# Patient Record
Sex: FEMALE | Race: ASIAN | NOT HISPANIC OR LATINO
[De-identification: names, ages, dates, MRNs, and addresses within clinical notes are randomized per-mention and may not be internally consistent; named-entity substitution may affect disease eponyms.]

---

## 2021-09-16 ENCOUNTER — APPOINTMENT (OUTPATIENT)
Dept: OBGYN | Facility: CLINIC | Age: 21
End: 2021-09-16

## 2021-09-29 ENCOUNTER — RESULT REVIEW (OUTPATIENT)
Age: 21
End: 2021-09-29

## 2021-09-29 ENCOUNTER — OUTPATIENT (OUTPATIENT)
Dept: OUTPATIENT SERVICES | Facility: HOSPITAL | Age: 21
LOS: 1 days | End: 2021-09-29

## 2021-09-29 ENCOUNTER — APPOINTMENT (OUTPATIENT)
Dept: OBGYN | Facility: HOSPITAL | Age: 21
End: 2021-09-29

## 2021-09-29 ENCOUNTER — NON-APPOINTMENT (OUTPATIENT)
Age: 21
End: 2021-09-29

## 2021-09-29 VITALS
HEART RATE: 89 BPM | DIASTOLIC BLOOD PRESSURE: 70 MMHG | HEIGHT: 61 IN | SYSTOLIC BLOOD PRESSURE: 107 MMHG | WEIGHT: 135 LBS | BODY MASS INDEX: 25.49 KG/M2

## 2021-09-29 DIAGNOSIS — Z83.3 FAMILY HISTORY OF DIABETES MELLITUS: ICD-10-CM

## 2021-09-29 DIAGNOSIS — Z00.00 ENCOUNTER FOR GENERAL ADULT MEDICAL EXAMINATION WITHOUT ABNORMAL FINDINGS: ICD-10-CM

## 2021-09-29 DIAGNOSIS — Z34.92 ENCOUNTER FOR SUPERVISION OF NORMAL PREGNANCY, UNSPECIFIED, SECOND TRIMESTER: ICD-10-CM

## 2021-09-29 DIAGNOSIS — Z82.49 FAMILY HISTORY OF ISCHEMIC HEART DISEASE AND OTHER DISEASES OF THE CIRCULATORY SYSTEM: ICD-10-CM

## 2021-09-29 DIAGNOSIS — Z00.00 ENCOUNTER FOR GENERAL ADULT MEDICAL EXAMINATION W/OUT ABNORMAL FINDINGS: ICD-10-CM

## 2021-09-29 LAB
A1C WITH ESTIMATED AVERAGE GLUCOSE RESULT: 5.2 % — SIGNIFICANT CHANGE UP (ref 4–5.6)
ALBUMIN SERPL ELPH-MCNC: 4.5 G/DL — SIGNIFICANT CHANGE UP (ref 3.3–5)
ALP SERPL-CCNC: 45 U/L — SIGNIFICANT CHANGE UP (ref 40–120)
ALT FLD-CCNC: 25 U/L — SIGNIFICANT CHANGE UP (ref 4–33)
ANION GAP SERPL CALC-SCNC: 15 MMOL/L — HIGH (ref 7–14)
APPEARANCE UR: ABNORMAL
AST SERPL-CCNC: 17 U/L — SIGNIFICANT CHANGE UP (ref 4–32)
BACTERIA # UR AUTO: ABNORMAL
BASOPHILS # BLD AUTO: 0.02 K/UL — SIGNIFICANT CHANGE UP (ref 0–0.2)
BASOPHILS NFR BLD AUTO: 0.2 % — SIGNIFICANT CHANGE UP (ref 0–2)
BILIRUB SERPL-MCNC: <0.2 MG/DL — SIGNIFICANT CHANGE UP (ref 0.2–1.2)
BILIRUB UR-MCNC: NEGATIVE — SIGNIFICANT CHANGE UP
BUN SERPL-MCNC: 5 MG/DL — LOW (ref 7–23)
CALCIUM SERPL-MCNC: 9.7 MG/DL — SIGNIFICANT CHANGE UP (ref 8.4–10.5)
CHLORIDE SERPL-SCNC: 102 MMOL/L — SIGNIFICANT CHANGE UP (ref 98–107)
CO2 SERPL-SCNC: 20 MMOL/L — LOW (ref 22–31)
COLOR SPEC: YELLOW — SIGNIFICANT CHANGE UP
CREAT SERPL-MCNC: 0.45 MG/DL — LOW (ref 0.5–1.3)
DIFF PNL FLD: ABNORMAL
EOSINOPHIL # BLD AUTO: 0.15 K/UL — SIGNIFICANT CHANGE UP (ref 0–0.5)
EOSINOPHIL NFR BLD AUTO: 1.6 % — SIGNIFICANT CHANGE UP (ref 0–6)
EPI CELLS # UR: 9 /HPF — HIGH (ref 0–5)
ESTIMATED AVERAGE GLUCOSE: 103 — SIGNIFICANT CHANGE UP
GLUCOSE SERPL-MCNC: 71 MG/DL — SIGNIFICANT CHANGE UP (ref 70–99)
GLUCOSE UR QL: NEGATIVE — SIGNIFICANT CHANGE UP
HCT VFR BLD CALC: 33.5 % — LOW (ref 34.5–45)
HGB BLD-MCNC: 11.1 G/DL — LOW (ref 11.5–15.5)
HIV 1+2 AB+HIV1 P24 AG SERPL QL IA: SIGNIFICANT CHANGE UP
HYALINE CASTS # UR AUTO: 1 /LPF — SIGNIFICANT CHANGE UP (ref 0–7)
IANC: 6.42 K/UL — SIGNIFICANT CHANGE UP (ref 1.5–8.5)
IMM GRANULOCYTES NFR BLD AUTO: 0.4 % — SIGNIFICANT CHANGE UP (ref 0–1.5)
KETONES UR-MCNC: NEGATIVE — SIGNIFICANT CHANGE UP
LEUKOCYTE ESTERASE UR-ACNC: ABNORMAL
LYMPHOCYTES # BLD AUTO: 2.19 K/UL — SIGNIFICANT CHANGE UP (ref 1–3.3)
LYMPHOCYTES # BLD AUTO: 23.5 % — SIGNIFICANT CHANGE UP (ref 13–44)
MCHC RBC-ENTMCNC: 26.9 PG — LOW (ref 27–34)
MCHC RBC-ENTMCNC: 33.1 GM/DL — SIGNIFICANT CHANGE UP (ref 32–36)
MCV RBC AUTO: 81.3 FL — SIGNIFICANT CHANGE UP (ref 80–100)
MONOCYTES # BLD AUTO: 0.48 K/UL — SIGNIFICANT CHANGE UP (ref 0–0.9)
MONOCYTES NFR BLD AUTO: 5.2 % — SIGNIFICANT CHANGE UP (ref 2–14)
NEUTROPHILS # BLD AUTO: 6.42 K/UL — SIGNIFICANT CHANGE UP (ref 1.8–7.4)
NEUTROPHILS NFR BLD AUTO: 69.1 % — SIGNIFICANT CHANGE UP (ref 43–77)
NITRITE UR-MCNC: NEGATIVE — SIGNIFICANT CHANGE UP
NRBC # BLD: 0 /100 WBCS — SIGNIFICANT CHANGE UP
NRBC # FLD: 0 K/UL — SIGNIFICANT CHANGE UP
PH UR: 6 — SIGNIFICANT CHANGE UP (ref 5–8)
PLATELET # BLD AUTO: 326 K/UL — SIGNIFICANT CHANGE UP (ref 150–400)
POTASSIUM SERPL-MCNC: 3.8 MMOL/L — SIGNIFICANT CHANGE UP (ref 3.5–5.3)
POTASSIUM SERPL-SCNC: 3.8 MMOL/L — SIGNIFICANT CHANGE UP (ref 3.5–5.3)
PROT SERPL-MCNC: 7.7 G/DL — SIGNIFICANT CHANGE UP (ref 6–8.3)
PROT UR-MCNC: ABNORMAL
RBC # BLD: 4.12 M/UL — SIGNIFICANT CHANGE UP (ref 3.8–5.2)
RBC # FLD: 17.2 % — HIGH (ref 10.3–14.5)
RBC CASTS # UR COMP ASSIST: 7 /HPF — HIGH (ref 0–4)
SODIUM SERPL-SCNC: 137 MMOL/L — SIGNIFICANT CHANGE UP (ref 135–145)
SP GR SPEC: 1.02 — SIGNIFICANT CHANGE UP (ref 1–1.05)
TSH SERPL-MCNC: 1.14 UIU/ML — SIGNIFICANT CHANGE UP (ref 0.27–4.2)
URATE SERPL-MCNC: 4.3 MG/DL — SIGNIFICANT CHANGE UP (ref 2.5–7)
UROBILINOGEN FLD QL: SIGNIFICANT CHANGE UP
WBC # BLD: 9.3 K/UL — SIGNIFICANT CHANGE UP (ref 3.8–10.5)
WBC # FLD AUTO: 9.3 K/UL — SIGNIFICANT CHANGE UP (ref 3.8–10.5)
WBC UR QL: 16 /HPF — HIGH (ref 0–5)

## 2021-09-30 ENCOUNTER — NON-APPOINTMENT (OUTPATIENT)
Age: 21
End: 2021-09-30

## 2021-09-30 LAB
24R-OH-CALCIDIOL SERPL-MCNC: 18.1 NG/ML — LOW (ref 30–80)
HBV SURFACE AG SER-ACNC: SIGNIFICANT CHANGE UP
HEMOGLOBIN INTERPRETATION: SIGNIFICANT CHANGE UP
HGB A MFR BLD: 96.7 % — SIGNIFICANT CHANGE UP
HGB A2 MFR BLD: 3 % — SIGNIFICANT CHANGE UP (ref 2.4–3.5)
HGB F MFR BLD: <1 % — SIGNIFICANT CHANGE UP (ref 0–1.5)
MEV IGG SER-ACNC: 149 AU/ML — SIGNIFICANT CHANGE UP
MEV IGG+IGM SER-IMP: POSITIVE — SIGNIFICANT CHANGE UP
RUBV IGG SER-ACNC: 6.2 INDEX — SIGNIFICANT CHANGE UP
RUBV IGG SER-IMP: POSITIVE — SIGNIFICANT CHANGE UP
T PALLIDUM AB TITR SER: NEGATIVE — SIGNIFICANT CHANGE UP
VZV IGG FLD QL IA: 278.6 INDEX — SIGNIFICANT CHANGE UP
VZV IGG FLD QL IA: POSITIVE — SIGNIFICANT CHANGE UP

## 2021-10-01 ENCOUNTER — NON-APPOINTMENT (OUTPATIENT)
Age: 21
End: 2021-10-01

## 2021-10-01 LAB
C TRACH RRNA SPEC QL NAA+PROBE: SIGNIFICANT CHANGE UP
CULTURE RESULTS: SIGNIFICANT CHANGE UP
GAMMA INTERFERON BACKGROUND BLD IA-ACNC: 0.04 IU/ML — SIGNIFICANT CHANGE UP
LEAD BLD-MCNC: 2 UG/DL — SIGNIFICANT CHANGE UP (ref 0–4)
M TB IFN-G BLD-IMP: NEGATIVE — SIGNIFICANT CHANGE UP
M TB IFN-G CD4+ BCKGRND COR BLD-ACNC: -0.01 IU/ML — SIGNIFICANT CHANGE UP
M TB IFN-G CD4+CD8+ BCKGRND COR BLD-ACNC: -0.01 IU/ML — SIGNIFICANT CHANGE UP
N GONORRHOEA RRNA SPEC QL NAA+PROBE: SIGNIFICANT CHANGE UP
QUANT TB PLUS MITOGEN MINUS NIL: 8.78 IU/ML — SIGNIFICANT CHANGE UP
SPECIMEN SOURCE: SIGNIFICANT CHANGE UP
SPECIMEN SOURCE: SIGNIFICANT CHANGE UP

## 2021-10-02 LAB — HCV RNA FLD QL NAA+PROBE: SIGNIFICANT CHANGE UP

## 2021-10-05 LAB
CYTOLOGY SPEC DOC CYTO: SIGNIFICANT CHANGE UP
SMA INTERPRETATION: SIGNIFICANT CHANGE UP

## 2021-10-06 ENCOUNTER — NON-APPOINTMENT (OUTPATIENT)
Age: 21
End: 2021-10-06

## 2021-10-07 LAB — CYSTIC FIBROSIS EXPANDED PANEL: SIGNIFICANT CHANGE UP

## 2021-10-08 ENCOUNTER — APPOINTMENT (OUTPATIENT)
Dept: ANTEPARTUM | Facility: CLINIC | Age: 21
End: 2021-10-08
Payer: MEDICAID

## 2021-10-12 ENCOUNTER — ASOB RESULT (OUTPATIENT)
Age: 21
End: 2021-10-12

## 2021-10-12 ENCOUNTER — APPOINTMENT (OUTPATIENT)
Dept: ANTEPARTUM | Facility: CLINIC | Age: 21
End: 2021-10-12
Payer: MEDICAID

## 2021-10-12 PROCEDURE — 76815 OB US LIMITED FETUS(S): CPT | Mod: 26

## 2021-10-15 ENCOUNTER — NON-APPOINTMENT (OUTPATIENT)
Age: 21
End: 2021-10-15

## 2021-10-18 ENCOUNTER — NON-APPOINTMENT (OUTPATIENT)
Age: 21
End: 2021-10-18

## 2021-10-27 ENCOUNTER — NON-APPOINTMENT (OUTPATIENT)
Age: 21
End: 2021-10-27

## 2021-10-29 ENCOUNTER — RESULT REVIEW (OUTPATIENT)
Age: 21
End: 2021-10-29

## 2021-10-29 ENCOUNTER — APPOINTMENT (OUTPATIENT)
Dept: OBGYN | Facility: HOSPITAL | Age: 21
End: 2021-10-29

## 2021-10-29 ENCOUNTER — MED ADMIN CHARGE (OUTPATIENT)
Age: 21
End: 2021-10-29

## 2021-10-29 ENCOUNTER — OUTPATIENT (OUTPATIENT)
Dept: OUTPATIENT SERVICES | Facility: HOSPITAL | Age: 21
LOS: 1 days | End: 2021-10-29

## 2021-10-29 DIAGNOSIS — Z34.92 ENCOUNTER FOR SUPERVISION OF NORMAL PREGNANCY, UNSPECIFIED, SECOND TRIMESTER: ICD-10-CM

## 2021-10-29 DIAGNOSIS — E55.9 VITAMIN D DEFICIENCY, UNSPECIFIED: ICD-10-CM

## 2021-10-29 DIAGNOSIS — Z23 ENCOUNTER FOR IMMUNIZATION: ICD-10-CM

## 2021-11-01 LAB
2ND TRIMESTER DATA: SIGNIFICANT CHANGE UP
AFP SERPL-ACNC: SIGNIFICANT CHANGE UP
B-HCG FREE SERPL-MCNC: SIGNIFICANT CHANGE UP
CLINICAL BIOCHEMIST REVIEW: SIGNIFICANT CHANGE UP
DEMOGRAPHIC DATA: SIGNIFICANT CHANGE UP
INHIBIN A SERPL-MCNC: SIGNIFICANT CHANGE UP
SCREEN-FOOTER: SIGNIFICANT CHANGE UP
U ESTRIOL SERPL-SCNC: SIGNIFICANT CHANGE UP

## 2021-11-17 ENCOUNTER — NON-APPOINTMENT (OUTPATIENT)
Age: 21
End: 2021-11-17

## 2021-11-19 ENCOUNTER — OUTPATIENT (OUTPATIENT)
Dept: OUTPATIENT SERVICES | Facility: HOSPITAL | Age: 21
LOS: 1 days | End: 2021-11-19

## 2021-11-19 ENCOUNTER — APPOINTMENT (OUTPATIENT)
Dept: ANTEPARTUM | Facility: CLINIC | Age: 21
End: 2021-11-19
Payer: MEDICAID

## 2021-11-19 ENCOUNTER — APPOINTMENT (OUTPATIENT)
Dept: OBGYN | Facility: HOSPITAL | Age: 21
End: 2021-11-19

## 2021-11-19 ENCOUNTER — NON-APPOINTMENT (OUTPATIENT)
Age: 21
End: 2021-11-19

## 2021-11-19 ENCOUNTER — ASOB RESULT (OUTPATIENT)
Age: 21
End: 2021-11-19

## 2021-11-19 VITALS
HEIGHT: 61 IN | DIASTOLIC BLOOD PRESSURE: 65 MMHG | BODY MASS INDEX: 27.08 KG/M2 | WEIGHT: 143.4 LBS | SYSTOLIC BLOOD PRESSURE: 120 MMHG

## 2021-11-19 DIAGNOSIS — Z34.92 ENCOUNTER FOR SUPERVISION OF NORMAL PREGNANCY, UNSPECIFIED, SECOND TRIMESTER: ICD-10-CM

## 2021-11-19 PROCEDURE — 76805 OB US >/= 14 WKS SNGL FETUS: CPT | Mod: 26

## 2021-11-21 ENCOUNTER — EMERGENCY (EMERGENCY)
Facility: HOSPITAL | Age: 21
LOS: 1 days | Discharge: STILL ADMITTED OR ADMINISTRATIVE | End: 2021-11-21
Admitting: EMERGENCY MEDICINE
Payer: MEDICAID

## 2021-11-21 ENCOUNTER — OUTPATIENT (OUTPATIENT)
Dept: INPATIENT UNIT | Facility: HOSPITAL | Age: 21
LOS: 1 days | Discharge: HOME | End: 2021-11-21
Payer: MEDICAID

## 2021-11-21 VITALS
HEART RATE: 84 BPM | RESPIRATION RATE: 18 BRPM | TEMPERATURE: 98 F | DIASTOLIC BLOOD PRESSURE: 60 MMHG | SYSTOLIC BLOOD PRESSURE: 98 MMHG | OXYGEN SATURATION: 100 %

## 2021-11-21 VITALS — HEART RATE: 78 BPM | DIASTOLIC BLOOD PRESSURE: 51 MMHG | SYSTOLIC BLOOD PRESSURE: 90 MMHG

## 2021-11-21 VITALS — SYSTOLIC BLOOD PRESSURE: 97 MMHG | DIASTOLIC BLOOD PRESSURE: 54 MMHG | HEART RATE: 87 BPM

## 2021-11-21 DIAGNOSIS — O26.899 OTHER SPECIFIED PREGNANCY RELATED CONDITIONS, UNSPECIFIED TRIMESTER: ICD-10-CM

## 2021-11-21 DIAGNOSIS — Z3A.00 WEEKS OF GESTATION OF PREGNANCY NOT SPECIFIED: ICD-10-CM

## 2021-11-21 LAB
ALBUMIN SERPL ELPH-MCNC: 4 G/DL — SIGNIFICANT CHANGE UP (ref 3.3–5)
ALP SERPL-CCNC: 48 U/L — SIGNIFICANT CHANGE UP (ref 40–120)
ALT FLD-CCNC: 19 U/L — SIGNIFICANT CHANGE UP (ref 4–33)
AMYLASE P1 CFR SERPL: 67 U/L — SIGNIFICANT CHANGE UP (ref 25–125)
ANION GAP SERPL CALC-SCNC: 12 MMOL/L — SIGNIFICANT CHANGE UP (ref 7–14)
APPEARANCE UR: ABNORMAL
AST SERPL-CCNC: 17 U/L — SIGNIFICANT CHANGE UP (ref 4–32)
BACTERIA # UR AUTO: ABNORMAL
BASOPHILS # BLD AUTO: 0.04 K/UL — SIGNIFICANT CHANGE UP (ref 0–0.2)
BASOPHILS NFR BLD AUTO: 0.4 % — SIGNIFICANT CHANGE UP (ref 0–2)
BILIRUB SERPL-MCNC: <0.2 MG/DL — SIGNIFICANT CHANGE UP (ref 0.2–1.2)
BILIRUB UR-MCNC: NEGATIVE — SIGNIFICANT CHANGE UP
BUN SERPL-MCNC: 10 MG/DL — SIGNIFICANT CHANGE UP (ref 7–23)
CALCIUM SERPL-MCNC: 9.8 MG/DL — SIGNIFICANT CHANGE UP (ref 8.4–10.5)
CHLORIDE SERPL-SCNC: 105 MMOL/L — SIGNIFICANT CHANGE UP (ref 98–107)
CO2 SERPL-SCNC: 20 MMOL/L — LOW (ref 22–31)
COLOR SPEC: YELLOW — SIGNIFICANT CHANGE UP
COMMENT - URINE: SIGNIFICANT CHANGE UP
CREAT SERPL-MCNC: 0.49 MG/DL — LOW (ref 0.5–1.3)
DIFF PNL FLD: NEGATIVE — SIGNIFICANT CHANGE UP
EOSINOPHIL # BLD AUTO: 0.22 K/UL — SIGNIFICANT CHANGE UP (ref 0–0.5)
EOSINOPHIL NFR BLD AUTO: 2 % — SIGNIFICANT CHANGE UP (ref 0–6)
EPI CELLS # UR: ABNORMAL
GLUCOSE SERPL-MCNC: 124 MG/DL — HIGH (ref 70–99)
GLUCOSE UR QL: ABNORMAL
HCT VFR BLD CALC: 29.1 % — LOW (ref 34.5–45)
HGB BLD-MCNC: 9.4 G/DL — LOW (ref 11.5–15.5)
HYALINE CASTS # UR AUTO: 1 /LPF — SIGNIFICANT CHANGE UP (ref 0–7)
IANC: 8.08 K/UL — SIGNIFICANT CHANGE UP (ref 1.5–8.5)
IMM GRANULOCYTES NFR BLD AUTO: 1.1 % — SIGNIFICANT CHANGE UP (ref 0–1.5)
KETONES UR-MCNC: NEGATIVE — SIGNIFICANT CHANGE UP
LEUKOCYTE ESTERASE UR-ACNC: NEGATIVE — SIGNIFICANT CHANGE UP
LIDOCAIN IGE QN: 52 U/L — SIGNIFICANT CHANGE UP (ref 7–60)
LYMPHOCYTES # BLD AUTO: 18.4 % — SIGNIFICANT CHANGE UP (ref 13–44)
LYMPHOCYTES # BLD AUTO: 2.08 K/UL — SIGNIFICANT CHANGE UP (ref 1–3.3)
MCHC RBC-ENTMCNC: 28.3 PG — SIGNIFICANT CHANGE UP (ref 27–34)
MCHC RBC-ENTMCNC: 32.3 GM/DL — SIGNIFICANT CHANGE UP (ref 32–36)
MCV RBC AUTO: 87.7 FL — SIGNIFICANT CHANGE UP (ref 80–100)
MONOCYTES # BLD AUTO: 0.74 K/UL — SIGNIFICANT CHANGE UP (ref 0–0.9)
MONOCYTES NFR BLD AUTO: 6.6 % — SIGNIFICANT CHANGE UP (ref 2–14)
NEUTROPHILS # BLD AUTO: 8.08 K/UL — HIGH (ref 1.8–7.4)
NEUTROPHILS NFR BLD AUTO: 71.5 % — SIGNIFICANT CHANGE UP (ref 43–77)
NITRITE UR-MCNC: NEGATIVE — SIGNIFICANT CHANGE UP
NRBC # BLD: 0 /100 WBCS — SIGNIFICANT CHANGE UP
NRBC # FLD: 0 K/UL — SIGNIFICANT CHANGE UP
PH UR: 8 — SIGNIFICANT CHANGE UP (ref 5–8)
PLATELET # BLD AUTO: 282 K/UL — SIGNIFICANT CHANGE UP (ref 150–400)
POTASSIUM SERPL-MCNC: 3.8 MMOL/L — SIGNIFICANT CHANGE UP (ref 3.5–5.3)
POTASSIUM SERPL-SCNC: 3.8 MMOL/L — SIGNIFICANT CHANGE UP (ref 3.5–5.3)
PROT SERPL-MCNC: 6.8 G/DL — SIGNIFICANT CHANGE UP (ref 6–8.3)
PROT UR-MCNC: ABNORMAL
RBC # BLD: 3.32 M/UL — LOW (ref 3.8–5.2)
RBC # FLD: 13.2 % — SIGNIFICANT CHANGE UP (ref 10.3–14.5)
RBC CASTS # UR COMP ASSIST: 3 /HPF — SIGNIFICANT CHANGE UP (ref 0–4)
SODIUM SERPL-SCNC: 137 MMOL/L — SIGNIFICANT CHANGE UP (ref 135–145)
SP GR SPEC: 1.02 — SIGNIFICANT CHANGE UP (ref 1–1.05)
UROBILINOGEN FLD QL: SIGNIFICANT CHANGE UP
WBC # BLD: 11.28 K/UL — HIGH (ref 3.8–10.5)
WBC # FLD AUTO: 11.28 K/UL — HIGH (ref 3.8–10.5)
WBC UR QL: 5 /HPF — SIGNIFICANT CHANGE UP (ref 0–5)

## 2021-11-21 PROCEDURE — 99214 OFFICE O/P EST MOD 30 MIN: CPT

## 2021-11-21 PROCEDURE — L9993: CPT

## 2021-11-21 RX ORDER — ACETAMINOPHEN 500 MG
1000 TABLET ORAL ONCE
Refills: 0 | Status: COMPLETED | OUTPATIENT
Start: 2021-11-21 | End: 2021-11-21

## 2021-11-21 RX ADMIN — Medication 1000 MILLIGRAM(S): at 03:23

## 2021-11-21 NOTE — ED ADULT TRIAGE NOTE - NS ED NURSE NOTE DISPO AOU DETAILS FT
Pt to be seen in L&D as per L&D provider Maggy Pt to be seen in L&D as per L&D provider Maggy. Escorted upstairs via wheelchair with Brennan and

## 2021-11-21 NOTE — OB PROVIDER TRIAGE NOTE - NSHPPHYSICALEXAM_GEN_ALL_CORE
ICU Vital Signs Last 24 Hrs  T(C): 36.7 (21 Nov 2021 02:30), Max: 36.7 (21 Nov 2021 01:41)  T(F): 98.1 (21 Nov 2021 02:30), Max: 98.1 (21 Nov 2021 02:30)  HR: 93 (21 Nov 2021 03:29) (84 - 93)  BP: 157/74 (21 Nov 2021 03:25) (93/53 - 157/74)  BP(mean): --  ABP: --  ABP(mean): --  RR: 17 (21 Nov 2021 02:30) (17 - 18)  SpO2: 94% (21 Nov 2021 03:25) (94% - 100%)    Abdomen soft nontender  No pain with deep palpation   TAS: Transverse presentation, anterior placenta, FHR: 150bpm, MVP: 5.18, EFW:393gms  Pt. refused speculum and TVS

## 2021-11-21 NOTE — ED ADULT TRIAGE NOTE - CHIEF COMPLAINT QUOTE
Pt c/o intermittent abd cramping x3 hours. Reports approx x5 months pregnant CORNELIO 4/18 states OB Dr. Ya. Denies ROM, bleeding. To be seen in L&D as per L&D provider Maggy

## 2021-11-21 NOTE — OB PROVIDER TRIAGE NOTE - ADDITIONAL INSTRUCTIONS
Pt. d/c'd home. Pt. instructed to follow up with next OB appointment. Pt. instructed to return to triage with increase abdominal contractions, leakage of fluid, vaginal bleeding or decrease fetal movement. Increase PO hydration encouraged.

## 2021-11-21 NOTE — OB PROVIDER TRIAGE NOTE - NSHPLABSRESULTS_GEN_ALL_CORE
9.4     )-----------( 282      ( 2021 03:28 )             29.1         137  |  105  |  10  ----------------------------<  124<H>  3.8   |  20<L>  |  0.49<L>    Ca    9.8      2021 03:28    TPro  6.8  /  Alb  4.0  /  TBili  <0.2  /  DBili  x   /  AST  17  /  ALT  19  /  AlkPhos  48  -    Amylase:  Lipase:    Urinalysis Basic - ( 2021 03:28 )    Color: Yellow / Appearance: Slightly Turbid / S.020 / pH: x  Gluc: x / Ketone: Negative  / Bili: Negative / Urobili: <2 mg/dL   Blood: x / Protein: 100 mg/dL / Nitrite: Negative   Leuk Esterase: Negative / RBC: 3 /HPF / WBC 5 /HPF   Sq Epi: x / Non Sq Epi: Many / Bacteria: Moderate 9.4     )-----------( 282      ( 2021 03:28 )             29.1         137  |  105  |  10  ----------------------------<  124<H>  3.8   |  20<L>  |  0.49<L>    Ca    9.8      2021 03:28    TPro  6.8  /  Alb  4.0  /  TBili  <0.2  /  DBili  x   /  AST  17  /  ALT  19  /  AlkPhos  48  11-    Amylase: 67  Lipase: 52    Urinalysis Basic - ( 2021 03:28 )    Color: Yellow / Appearance: Slightly Turbid / S.020 / pH: x  Gluc: x / Ketone: Negative  / Bili: Negative / Urobili: <2 mg/dL   Blood: x / Protein: 100 mg/dL / Nitrite: Negative   Leuk Esterase: Negative / RBC: 3 /HPF / WBC 5 /HPF   Sq Epi: x / Non Sq Epi: Many / Bacteria: Moderate

## 2021-11-21 NOTE — OB PROVIDER TRIAGE NOTE - NSOBPROVIDERNOTE_OBGYN_ALL_OB_FT
UA, CBC, CMP, Amylase/Lipase ordered  Tylenol 1000mg ordered UA, CBC, CMP, Amylase/Lipase ordered  Tylenol 1000mg ordered    @430am   Pt. states she feels better after Tylenol    Discussed findings with Dr. Pacheco. Pt. d/c'd home. Pt. instructed to follow up with next OB appointment. Pt. instructed to return to triage with increase abdominal contractions, leakage of fluid, vaginal bleeding or decrease fetal movement. Increase PO hydration encouraged. UA, CBC, CMP, Amylase/Lipase ordered  Tylenol 1000mg ordered    @430am   Pt. states she feels better after Tylenol    Discussed findings with Dr. Pacheco/Dr. Sherman. Pt. d/c'd home. Pt. instructed to follow up with next OB appointment. Pt. instructed to return to triage with increase abdominal contractions, leakage of fluid, vaginal bleeding or decrease fetal movement. Increase PO hydration encouraged.

## 2021-11-21 NOTE — OB PROVIDER TRIAGE NOTE - HISTORY OF PRESENT ILLNESS
Pt. is a 20y/o  EGA 20.6wks reports of bilateral upper abdominal pain and mid left side abdominal pain that comes and goes. Pt. states the pain started since 5pm and is 10/10. Pt. denies lower abdominal pain, contractions, pelvic pressure, and dysuria. Pt. reports good fetal movement.      AP: Denies  Medical/surgical Hx: Denies  OBGYN Hx: Denies  Meds: PNV and Iron  NKDA

## 2021-11-23 ENCOUNTER — RESULT REVIEW (OUTPATIENT)
Age: 21
End: 2021-11-23

## 2021-11-23 ENCOUNTER — OUTPATIENT (OUTPATIENT)
Dept: OUTPATIENT SERVICES | Facility: HOSPITAL | Age: 21
LOS: 1 days | End: 2021-11-23
Payer: MEDICAID

## 2021-11-23 ENCOUNTER — APPOINTMENT (OUTPATIENT)
Dept: ULTRASOUND IMAGING | Facility: IMAGING CENTER | Age: 21
End: 2021-11-23
Payer: MEDICAID

## 2021-11-23 DIAGNOSIS — R10.9 UNSPECIFIED ABDOMINAL PAIN: ICD-10-CM

## 2021-11-23 PROCEDURE — 76700 US EXAM ABDOM COMPLETE: CPT

## 2021-11-23 PROCEDURE — 76700 US EXAM ABDOM COMPLETE: CPT | Mod: 26

## 2021-12-13 ENCOUNTER — NON-APPOINTMENT (OUTPATIENT)
Age: 21
End: 2021-12-13

## 2021-12-14 ENCOUNTER — APPOINTMENT (OUTPATIENT)
Dept: OBGYN | Facility: HOSPITAL | Age: 21
End: 2021-12-14

## 2021-12-14 ENCOUNTER — OUTPATIENT (OUTPATIENT)
Dept: OUTPATIENT SERVICES | Facility: HOSPITAL | Age: 21
LOS: 1 days | End: 2021-12-14

## 2021-12-14 VITALS
SYSTOLIC BLOOD PRESSURE: 109 MMHG | BODY MASS INDEX: 27.38 KG/M2 | DIASTOLIC BLOOD PRESSURE: 61 MMHG | WEIGHT: 145 LBS | HEIGHT: 61 IN | HEART RATE: 98 BPM | TEMPERATURE: 97.4 F

## 2021-12-21 DIAGNOSIS — Z34.92 ENCOUNTER FOR SUPERVISION OF NORMAL PREGNANCY, UNSPECIFIED, SECOND TRIMESTER: ICD-10-CM

## 2022-01-04 ENCOUNTER — OUTPATIENT (OUTPATIENT)
Dept: OUTPATIENT SERVICES | Facility: HOSPITAL | Age: 22
LOS: 1 days | End: 2022-01-04

## 2022-01-04 ENCOUNTER — APPOINTMENT (OUTPATIENT)
Dept: OBGYN | Facility: HOSPITAL | Age: 22
End: 2022-01-04

## 2022-01-04 VITALS
HEART RATE: 104 BPM | DIASTOLIC BLOOD PRESSURE: 62 MMHG | WEIGHT: 148 LBS | SYSTOLIC BLOOD PRESSURE: 103 MMHG | TEMPERATURE: 98.4 F

## 2022-01-05 DIAGNOSIS — Z34.93 ENCOUNTER FOR SUPERVISION OF NORMAL PREGNANCY, UNSPECIFIED, THIRD TRIMESTER: ICD-10-CM

## 2022-01-06 ENCOUNTER — NON-APPOINTMENT (OUTPATIENT)
Age: 22
End: 2022-01-06

## 2022-01-14 ENCOUNTER — RESULT REVIEW (OUTPATIENT)
Age: 22
End: 2022-01-14

## 2022-01-14 ENCOUNTER — APPOINTMENT (OUTPATIENT)
Dept: OBGYN | Facility: HOSPITAL | Age: 22
End: 2022-01-14

## 2022-01-14 ENCOUNTER — OUTPATIENT (OUTPATIENT)
Dept: OUTPATIENT SERVICES | Facility: HOSPITAL | Age: 22
LOS: 1 days | End: 2022-01-14

## 2022-01-14 VITALS
WEIGHT: 150 LBS | BODY MASS INDEX: 28.32 KG/M2 | HEART RATE: 95 BPM | DIASTOLIC BLOOD PRESSURE: 67 MMHG | TEMPERATURE: 98.1 F | SYSTOLIC BLOOD PRESSURE: 113 MMHG | HEIGHT: 61 IN

## 2022-01-14 LAB
24R-OH-CALCIDIOL SERPL-MCNC: 16.2 NG/ML — LOW (ref 30–80)
BASOPHILS # BLD AUTO: 0.05 K/UL — SIGNIFICANT CHANGE UP (ref 0–0.2)
BASOPHILS NFR BLD AUTO: 0.4 % — SIGNIFICANT CHANGE UP (ref 0–2)
EOSINOPHIL # BLD AUTO: 0.17 K/UL — SIGNIFICANT CHANGE UP (ref 0–0.5)
EOSINOPHIL NFR BLD AUTO: 1.4 % — SIGNIFICANT CHANGE UP (ref 0–6)
GLUCOSE 1H P MEAL SERPL-MCNC: 171 MG/DL — HIGH (ref 70–134)
HCT VFR BLD CALC: 30.1 % — LOW (ref 34.5–45)
HGB BLD-MCNC: 10 G/DL — LOW (ref 11.5–15.5)
IANC: 8.51 K/UL — HIGH (ref 1.5–8.5)
IMM GRANULOCYTES NFR BLD AUTO: 2.5 % — HIGH (ref 0–1.5)
LYMPHOCYTES # BLD AUTO: 18.5 % — SIGNIFICANT CHANGE UP (ref 13–44)
LYMPHOCYTES # BLD AUTO: 2.18 K/UL — SIGNIFICANT CHANGE UP (ref 1–3.3)
MCHC RBC-ENTMCNC: 29.1 PG — SIGNIFICANT CHANGE UP (ref 27–34)
MCHC RBC-ENTMCNC: 33.2 GM/DL — SIGNIFICANT CHANGE UP (ref 32–36)
MCV RBC AUTO: 87.5 FL — SIGNIFICANT CHANGE UP (ref 80–100)
MONOCYTES # BLD AUTO: 0.57 K/UL — SIGNIFICANT CHANGE UP (ref 0–0.9)
MONOCYTES NFR BLD AUTO: 4.8 % — SIGNIFICANT CHANGE UP (ref 2–14)
NEUTROPHILS # BLD AUTO: 8.51 K/UL — HIGH (ref 1.8–7.4)
NEUTROPHILS NFR BLD AUTO: 72.4 % — SIGNIFICANT CHANGE UP (ref 43–77)
NRBC # BLD: 0 /100 WBCS — SIGNIFICANT CHANGE UP
NRBC # FLD: 0 K/UL — SIGNIFICANT CHANGE UP
PLATELET # BLD AUTO: 290 K/UL — SIGNIFICANT CHANGE UP (ref 150–400)
RBC # BLD: 3.44 M/UL — LOW (ref 3.8–5.2)
RBC # FLD: 13 % — SIGNIFICANT CHANGE UP (ref 10.3–14.5)
T PALLIDUM AB TITR SER: NEGATIVE — SIGNIFICANT CHANGE UP
WBC # BLD: 11.77 K/UL — HIGH (ref 3.8–10.5)
WBC # FLD AUTO: 11.77 K/UL — HIGH (ref 3.8–10.5)

## 2022-01-14 RX ORDER — HUMAN RHO(D) IMMUNE GLOBULIN 300 UG/1
1500 INJECTION, SOLUTION INTRAMUSCULAR
Qty: 0 | Refills: 0 | Status: COMPLETED | OUTPATIENT
Start: 2022-01-14

## 2022-01-14 RX ADMIN — HUMAN RHO(D) IMMUNE GLOBULIN 0 UNIT: 300 INJECTION, SOLUTION INTRAMUSCULAR at 00:00

## 2022-01-18 DIAGNOSIS — Z33.1 PREGNANT STATE, INCIDENTAL: ICD-10-CM

## 2022-01-21 ENCOUNTER — OUTPATIENT (OUTPATIENT)
Dept: OUTPATIENT SERVICES | Facility: HOSPITAL | Age: 22
LOS: 1 days | End: 2022-01-21

## 2022-01-21 ENCOUNTER — NON-APPOINTMENT (OUTPATIENT)
Age: 22
End: 2022-01-21

## 2022-01-21 ENCOUNTER — RESULT REVIEW (OUTPATIENT)
Age: 22
End: 2022-01-21

## 2022-01-21 ENCOUNTER — APPOINTMENT (OUTPATIENT)
Dept: OBGYN | Facility: HOSPITAL | Age: 22
End: 2022-01-21

## 2022-01-21 DIAGNOSIS — Z34.92 ENCOUNTER FOR SUPERVISION OF NORMAL PREGNANCY, UNSPECIFIED, SECOND TRIMESTER: ICD-10-CM

## 2022-01-21 LAB
GESTATIONAL GTT PNL UR+SERPL: 99 MG/DL — HIGH (ref 70–94)
GLUCOSE 1H P CHAL SERPL-MCNC: 168 MG/DL — SIGNIFICANT CHANGE UP (ref 70–179)
GTT GEST 2H PNL UR+SERPL: 157 MG/DL — HIGH (ref 70–154)
GTT GEST 3H PNL SERPL: 112 MG/DL — SIGNIFICANT CHANGE UP (ref 70–139)

## 2022-01-24 ENCOUNTER — NON-APPOINTMENT (OUTPATIENT)
Age: 22
End: 2022-01-24

## 2022-01-25 ENCOUNTER — APPOINTMENT (OUTPATIENT)
Dept: OBGYN | Facility: HOSPITAL | Age: 22
End: 2022-01-25

## 2022-01-25 ENCOUNTER — OUTPATIENT (OUTPATIENT)
Dept: OUTPATIENT SERVICES | Facility: HOSPITAL | Age: 22
LOS: 1 days | End: 2022-01-25

## 2022-01-25 DIAGNOSIS — O24.419 GESTATIONAL DIABETES MELLITUS IN PREGNANCY, UNSPECIFIED CONTROL: ICD-10-CM

## 2022-02-07 ENCOUNTER — NON-APPOINTMENT (OUTPATIENT)
Age: 22
End: 2022-02-07

## 2022-02-08 ENCOUNTER — APPOINTMENT (OUTPATIENT)
Dept: OBGYN | Facility: HOSPITAL | Age: 22
End: 2022-02-08

## 2022-02-08 ENCOUNTER — OUTPATIENT (OUTPATIENT)
Dept: OUTPATIENT SERVICES | Facility: HOSPITAL | Age: 22
LOS: 1 days | End: 2022-02-08

## 2022-02-08 ENCOUNTER — NON-APPOINTMENT (OUTPATIENT)
Age: 22
End: 2022-02-08

## 2022-02-08 ENCOUNTER — MED ADMIN CHARGE (OUTPATIENT)
Age: 22
End: 2022-02-08

## 2022-02-08 VITALS
DIASTOLIC BLOOD PRESSURE: 61 MMHG | SYSTOLIC BLOOD PRESSURE: 104 MMHG | WEIGHT: 153 LBS | HEART RATE: 90 BPM | RESPIRATION RATE: 20 BRPM | TEMPERATURE: 97.9 F

## 2022-02-08 DIAGNOSIS — D64.9 ANEMIA, UNSPECIFIED: ICD-10-CM

## 2022-02-08 DIAGNOSIS — Z67.91 UNSPECIFIED BLOOD TYPE, RH NEGATIVE: ICD-10-CM

## 2022-02-08 DIAGNOSIS — O24.419 GESTATIONAL DIABETES MELLITUS IN PREGNANCY, UNSPECIFIED CONTROL: ICD-10-CM

## 2022-02-08 DIAGNOSIS — Z23 ENCOUNTER FOR IMMUNIZATION: ICD-10-CM

## 2022-02-08 DIAGNOSIS — E55.9 VITAMIN D DEFICIENCY, UNSPECIFIED: ICD-10-CM

## 2022-02-10 ENCOUNTER — APPOINTMENT (OUTPATIENT)
Dept: OBGYN | Facility: HOSPITAL | Age: 22
End: 2022-02-10

## 2022-02-10 ENCOUNTER — NON-APPOINTMENT (OUTPATIENT)
Age: 22
End: 2022-02-10

## 2022-02-11 ENCOUNTER — ASOB RESULT (OUTPATIENT)
Age: 22
End: 2022-02-11

## 2022-02-11 ENCOUNTER — APPOINTMENT (OUTPATIENT)
Dept: ANTEPARTUM | Facility: CLINIC | Age: 22
End: 2022-02-11
Payer: MEDICAID

## 2022-02-11 PROCEDURE — 76816 OB US FOLLOW-UP PER FETUS: CPT | Mod: 26

## 2022-02-11 PROCEDURE — 76819 FETAL BIOPHYS PROFIL W/O NST: CPT | Mod: 26

## 2022-02-13 ENCOUNTER — EMERGENCY (EMERGENCY)
Facility: HOSPITAL | Age: 22
LOS: 1 days | Discharge: HOME | End: 2022-02-13
Attending: STUDENT IN AN ORGANIZED HEALTH CARE EDUCATION/TRAINING PROGRAM | Admitting: EMERGENCY MEDICINE
Payer: MEDICAID

## 2022-02-13 ENCOUNTER — NON-APPOINTMENT (OUTPATIENT)
Age: 22
End: 2022-02-13

## 2022-02-13 VITALS
RESPIRATION RATE: 16 BRPM | HEART RATE: 72 BPM | OXYGEN SATURATION: 100 % | SYSTOLIC BLOOD PRESSURE: 76 MMHG | DIASTOLIC BLOOD PRESSURE: 34 MMHG

## 2022-02-13 PROCEDURE — 99291 CRITICAL CARE FIRST HOUR: CPT

## 2022-02-13 RX ORDER — SODIUM CHLORIDE 9 MG/ML
1000 INJECTION INTRAMUSCULAR; INTRAVENOUS; SUBCUTANEOUS ONCE
Refills: 0 | Status: COMPLETED | OUTPATIENT
Start: 2022-02-13 | End: 2022-02-13

## 2022-02-13 RX ADMIN — SODIUM CHLORIDE 1000 MILLILITER(S): 9 INJECTION INTRAMUSCULAR; INTRAVENOUS; SUBCUTANEOUS at 23:58

## 2022-02-13 NOTE — ED ADULT NURSE NOTE - OBJECTIVE STATEMENT
Pt received in TR-A. Pt a/0x3, ambulatory at baseline, Japanese speaking,  at bedside. Pt c/o sharp non-radiating chest pain/ n/v that began tonight after eating dinner. Pt hypotensive on triage BP- 98/56. Pt placed on 1L Normal Saline bolus per MD orders. Pt 33 weeks pregnant, . Respirations even and unlabored, pt able to speak in clear sentences, no use of accessory muscles. Pt denies vaginal bleeding, dizziness, headache, blurry vision, diarrhea, fever, chills. Pt appears lethargic,  used, MD at bedside. 20 IVG placed on right ac, 18 G placed on left ac, labs drawn and sent. Awaiting further MD orders.

## 2022-02-13 NOTE — ED ADULT NURSE NOTE - CHIEF COMPLAINT QUOTE
33 weeks pregnant  from home for chest, nausea vomiting that started tonight after eating dinner. Azeri speaking requesting  to translate. Patient appears lethargic in triage. Patient hypotensive Charge RN aware. Taken to Tr A directly for EKG, MD Warren aware.

## 2022-02-13 NOTE — ED PROVIDER NOTE - PATIENT PORTAL LINK FT
You can access the FollowMyHealth Patient Portal offered by [facility] by registering at the following website: [email or web address]. By joining [employer]’s FollowMyHealth portal, you will also be able to view your health information using other applications (apps) compatible with our system.

## 2022-02-13 NOTE — ED PROVIDER NOTE - PHYSICAL EXAMINATION
GEN - NAD; non-toxic; A+Ox3, speaking full sentences  HENT - NC/AT, No visible Ecchymosis, No Abrasions, No Lac/Tears, MMM, no discharge  EYES - EOMI, no conjunctival pallor, no scleral icterus  PULM - Tachypneic, CTA B/L,  symmetric breath sounds  CV -  S1 S2, no murmurs 2+ Pulses B/L UE  GI - (-) Yañez's, (-) Rovsings, (-) McBurneys; NT/ND, soft, no guarding, no rebound, no masses    MSK/EXT- no CVA tenderness; no edema, no gross deformity, warm and well perfused, no calf tenderness/swelling/erythema   SKIN - no rash or bruising  NEUROLOGIC - alert, moving all 4 ext with 5/5 Strength

## 2022-02-13 NOTE — CHART NOTE - NSCHARTNOTEFT_GEN_A_CORE
R2 Chart Note     Patient was seen and examined at bedside . Patient states that she came to the ED for 10/10 chest pain that started today. The pain was so intense that it caused her to have one episode of emesis. Patient denies having any cardiac history. Patient denies any contractions, loss of fluid, vaginal bleeding. Endorses fetal movement.      PNC: [facility]   Patient has GDMA2 on  Metformin 500 BID , took both doses today  OB:Primigravida     NAD  AF, HR:72, BP:76/34   CV:RRR  Abdomen: Nontender Gravid  FH:144       22 year old  @ 32w 6 d (CORNELIO:22) presenting to the ED with 10/10 chest pain and hypotension.  Patient to be worked up by ED for cause     -NST to be performed , OBGYN charge nurse aware  -Continue care as per ED       d/w Dr. Jax Mccauley, PGY2

## 2022-02-13 NOTE — ED ADULT NURSE NOTE - CAS EDP DISCH TYPE
CONTROLLED MEDICATION REFILL REQUEST     URINE DRUG SCREEN: Urine Drug Screen - (03/01/2021 10:47)    LAST OFFICE VISIT: Office Visit with Nick Vallejo Jr., MD (07/22/2021)    NARC CONSENT: 03/01/2021 [place]    NEXT OFFICE VISIT: NONE IN EPIC    MEDICATION: methylphenidate (Concerta) 18 MG CR tablet (10/26/2021)    OVER 6 MONTHS SINCE LAST UDS(URINE DRUG SCREEN)    PROVIDER PLEASE ADVISE   Home

## 2022-02-13 NOTE — ED PROVIDER NOTE - ATTENDING CONTRIBUTION TO CARE
I (Briana) agree with above, I performed a history and physical. Counseled aury medical staff, physician assistant, and/or medical student on medical decision making as documented. Medical decisions and treatment interventions were made in real time during the patient encounter. Additionally and/or with the following exceptions: The patient presented to the ED with chest pain, 33w pregnant, 10/10, central chest, pressure like, has had this before but had negative work up. She also feels short of breath. ||| hypotensive in triage to 70's/30's. ||| immediately brought to trauma bay ||| IVx2, fluids running, patient mentating normally ||| Given chest pain and clinical picture, plan for d-dimer, with reflex CTA if positive, OB consulted for NST, signed out to oncoming attending pending complete workup and reassessment.      services utilized.    Additional time as above spent by myself, separate from billable procedures, included coordination of patient care with consultants/admitting team, performing reassessments on the patient, documentation, and counseling patient/family members on the care provided.

## 2022-02-13 NOTE — ED PROVIDER NOTE - CLINICAL SUMMARY MEDICAL DECISION MAKING FREE TEXT BOX
: [id number] - 22 female,  - 33 weeks pregnant by date (Olivia Byrd MD OBGYN) - presents with acute onset, retrosternal chest pain and SOB that began 1 hour PTA. Pt brought back immediately to Trauma A - 2 Large Peripheral IV's established, Fluids administered with subsequent resolution of Hypotension. Exam, presentation, and history concerning for anemia vs. orthostasis vs. ACS - less likely PE (no prior history, NOT hypoxemic, no s/sx DVT on exam - low risk Well's - D-Dimer and consider CTA if positive). Plan: CBC, CMP, EKG, CXR, Trop, D-Dimer, Fetal Heart Check (144 bpm). Denies any abdominal pain/vaginal bleeding - do not suspect any uterine rupture or maternal hemorrhage at this time. Eval is NOT consistent with Dissection/AAA rupture (2+ Pulses B/L UE and LE, no CP radiating to back).

## 2022-02-13 NOTE — ED ADULT TRIAGE NOTE - CHIEF COMPLAINT QUOTE
33 weeks pregnant  from home for chest, nausea vomiting that started tonight after eating dinner. Welsh speaking requesting  to translate. Patient appears lethargic in triage. Patient hypotensive Charge RN aware. Taken to Tr A directly for EKG, MD Warren aware.

## 2022-02-13 NOTE — ED PROVIDER NOTE - NSFOLLOWUPINSTRUCTIONS_ED_ALL_ED_FT
You were seen and evaluated in the Emergency Department for your chest pain. You were evaluated clinically and with laboratory studies.    At this time your clinical evaluation and history do not demonstrate any acute, life-threatening medical conditions warranting emergent treatment. However, we strongly recommend you follow up with one of our Cardiology consultants (or your own) for further evaluation of your symptoms by calling the following number to make an appointment:    [facility]  Cardiology  [address] [place] [address]  Phone: [phone]    [facility]  Gastroenterology  [address] [place] [address] [address]  [place] [address]  Phone: [phone]      Should you develop new or worsening chest pain, abdominal pain, vaginal bleeding, shortness of breath, fevers, chills, nausea, vomiting, diarrhea, or constipation - please return to the ED for immediate evaluation.     We also strongly encourage you make an appointment with your Primary Care Physician for a comprehensive evaluation of your health.

## 2022-02-13 NOTE — ED PROVIDER NOTE - OBJECTIVE STATEMENT
: [id number] - 22 female,  - 33 weeks pregnant by date (Olivia Byrd MD OBGYN) - presents with acute onset, retrosternal chest pain and SOB that began 1 hour PTA. Denies any fevers, chills, cough, abdominal pain, nausea, vomiting, diarrhea, constipation, bloody stools, dysuric symptoms. Denies any vaginal bleeding/discharge. Denies any prior history of PE/DVT, no recent surg/hops, denies any calf tenderness/erythema.

## 2022-02-13 NOTE — ED PROVIDER NOTE - PROGRESS NOTE DETAILS
Dr. Kel Abrams DO (ED ATTENDING):   DDimer noted to be elevated. Will order CTPA. In setting of chest pain, sob, pregnancy, and resolved episode of hypotension earlier, BENEFITS OF RADIATION OUTWEIGH RISKS during pregnancy.     Patient aware of CTPA and agreeable with plan of care Resident Meghana: pt re-evaluated clinically, comfortable on re-exam. Preliminary evaluation without any acute, actionable pathology. Unclear etiology of Leukocytosis - rectally afebrile, chest studies without acute PNA, UA grossly non-infected will follow UCx. BP remain stable after IV Hydration. Pt is tolerating PO intake, without any nausea/vomiting, with stable gait - will discharge home with followup care.

## 2022-02-14 VITALS
DIASTOLIC BLOOD PRESSURE: 69 MMHG | OXYGEN SATURATION: 100 % | HEART RATE: 93 BPM | RESPIRATION RATE: 21 BRPM | SYSTOLIC BLOOD PRESSURE: 104 MMHG

## 2022-02-14 LAB
APPEARANCE UR: ABNORMAL
BACTERIA # UR AUTO: ABNORMAL
BILIRUB UR-MCNC: NEGATIVE — SIGNIFICANT CHANGE UP
BLOOD GAS VENOUS COMPREHENSIVE RESULT: SIGNIFICANT CHANGE UP
COLOR SPEC: SIGNIFICANT CHANGE UP
DIFF PNL FLD: NEGATIVE — SIGNIFICANT CHANGE UP
EPI CELLS # UR: 6 /HPF — HIGH (ref 0–5)
GLUCOSE UR QL: NEGATIVE — SIGNIFICANT CHANGE UP
HYALINE CASTS # UR AUTO: 2 /LPF — SIGNIFICANT CHANGE UP (ref 0–7)
KETONES UR-MCNC: NEGATIVE — SIGNIFICANT CHANGE UP
LEUKOCYTE ESTERASE UR-ACNC: ABNORMAL
NITRITE UR-MCNC: NEGATIVE — SIGNIFICANT CHANGE UP
PH UR: 6.5 — SIGNIFICANT CHANGE UP (ref 5–8)
PROT UR-MCNC: ABNORMAL
RBC CASTS # UR COMP ASSIST: SIGNIFICANT CHANGE UP /HPF (ref 0–4)
SP GR SPEC: 1.01 — SIGNIFICANT CHANGE UP (ref 1–1.05)
TROPONIN T, HIGH SENSITIVITY RESULT: <6 NG/L — SIGNIFICANT CHANGE UP
UROBILINOGEN FLD QL: SIGNIFICANT CHANGE UP
WBC UR QL: SIGNIFICANT CHANGE UP /HPF (ref 0–5)

## 2022-02-14 PROCEDURE — 71045 X-RAY EXAM CHEST 1 VIEW: CPT | Mod: 26

## 2022-02-14 PROCEDURE — 71275 CT ANGIOGRAPHY CHEST: CPT | Mod: 26,MA

## 2022-02-14 RX ORDER — SODIUM CHLORIDE 9 MG/ML
1000 INJECTION INTRAMUSCULAR; INTRAVENOUS; SUBCUTANEOUS ONCE
Refills: 0 | Status: COMPLETED | OUTPATIENT
Start: 2022-02-14 | End: 2022-02-14

## 2022-02-14 RX ADMIN — SODIUM CHLORIDE 1000 MILLILITER(S): 9 INJECTION INTRAMUSCULAR; INTRAVENOUS; SUBCUTANEOUS at 00:16

## 2022-02-14 NOTE — CHART NOTE - NSCHARTNOTEFT_GEN_A_CORE
OB Non-Stress Test    21 yo  @ 32w6d (CORNELIO: 22) being evaluated in the ED for CP and hypotension.   NST reviewed: baseline 130/moderate variability/+accels/-decels    E Demirel PGY4

## 2022-02-15 LAB
CULTURE RESULTS: SIGNIFICANT CHANGE UP
SPECIMEN SOURCE: SIGNIFICANT CHANGE UP

## 2022-02-16 ENCOUNTER — NON-APPOINTMENT (OUTPATIENT)
Age: 22
End: 2022-02-16

## 2022-02-17 ENCOUNTER — NON-APPOINTMENT (OUTPATIENT)
Age: 22
End: 2022-02-17

## 2022-02-22 ENCOUNTER — APPOINTMENT (OUTPATIENT)
Dept: OBGYN | Facility: HOSPITAL | Age: 22
End: 2022-02-22

## 2022-02-22 ENCOUNTER — OUTPATIENT (OUTPATIENT)
Dept: OUTPATIENT SERVICES | Facility: HOSPITAL | Age: 22
LOS: 1 days | End: 2022-02-22

## 2022-02-22 VITALS
TEMPERATURE: 97.3 F | DIASTOLIC BLOOD PRESSURE: 70 MMHG | HEART RATE: 101 BPM | SYSTOLIC BLOOD PRESSURE: 111 MMHG | WEIGHT: 155 LBS

## 2022-02-22 RX ORDER — PEN NEEDLE, DIABETIC 29 G X1/2"
32G X 4 MM NEEDLE, DISPOSABLE MISCELLANEOUS
Qty: 1 | Refills: 2 | Status: DISCONTINUED | COMMUNITY
Start: 2022-02-22 | End: 2022-02-22

## 2022-02-24 DIAGNOSIS — O24.419 GESTATIONAL DIABETES MELLITUS IN PREGNANCY, UNSPECIFIED CONTROL: ICD-10-CM

## 2022-02-24 DIAGNOSIS — Z34.93 ENCOUNTER FOR SUPERVISION OF NORMAL PREGNANCY, UNSPECIFIED, THIRD TRIMESTER: ICD-10-CM

## 2022-03-02 ENCOUNTER — NON-APPOINTMENT (OUTPATIENT)
Age: 22
End: 2022-03-02

## 2022-03-03 ENCOUNTER — NON-APPOINTMENT (OUTPATIENT)
Age: 22
End: 2022-03-03

## 2022-03-03 ENCOUNTER — OUTPATIENT (OUTPATIENT)
Dept: OUTPATIENT SERVICES | Facility: HOSPITAL | Age: 22
LOS: 1 days | End: 2022-03-03

## 2022-03-03 ENCOUNTER — APPOINTMENT (OUTPATIENT)
Dept: OBGYN | Facility: HOSPITAL | Age: 22
End: 2022-03-03

## 2022-03-03 VITALS
DIASTOLIC BLOOD PRESSURE: 52 MMHG | SYSTOLIC BLOOD PRESSURE: 112 MMHG | TEMPERATURE: 97.2 F | WEIGHT: 155 LBS | HEART RATE: 86 BPM

## 2022-03-03 DIAGNOSIS — Z34.93 ENCOUNTER FOR SUPERVISION OF NORMAL PREGNANCY, UNSPECIFIED, THIRD TRIMESTER: ICD-10-CM

## 2022-03-03 DIAGNOSIS — O24.419 GESTATIONAL DIABETES MELLITUS IN PREGNANCY, UNSPECIFIED CONTROL: ICD-10-CM

## 2022-03-07 ENCOUNTER — APPOINTMENT (OUTPATIENT)
Dept: OBGYN | Facility: HOSPITAL | Age: 22
End: 2022-03-07

## 2022-03-07 ENCOUNTER — NON-APPOINTMENT (OUTPATIENT)
Age: 22
End: 2022-03-07

## 2022-03-11 ENCOUNTER — APPOINTMENT (OUTPATIENT)
Dept: ANTEPARTUM | Facility: CLINIC | Age: 22
End: 2022-03-11
Payer: MEDICAID

## 2022-03-11 ENCOUNTER — ASOB RESULT (OUTPATIENT)
Age: 22
End: 2022-03-11

## 2022-03-11 PROCEDURE — 76819 FETAL BIOPHYS PROFIL W/O NST: CPT | Mod: 26

## 2022-03-11 PROCEDURE — 76816 OB US FOLLOW-UP PER FETUS: CPT | Mod: 26

## 2022-03-12 ENCOUNTER — EMERGENCY (EMERGENCY)
Facility: HOSPITAL | Age: 22
LOS: 1 days | Discharge: HOME | End: 2022-03-12
Attending: EMERGENCY MEDICINE | Admitting: EMERGENCY MEDICINE
Payer: MEDICAID

## 2022-03-12 VITALS
HEART RATE: 78 BPM | DIASTOLIC BLOOD PRESSURE: 61 MMHG | RESPIRATION RATE: 22 BRPM | SYSTOLIC BLOOD PRESSURE: 95 MMHG | TEMPERATURE: 97 F | OXYGEN SATURATION: 100 %

## 2022-03-12 VITALS — DIASTOLIC BLOOD PRESSURE: 46 MMHG | HEART RATE: 51 BPM | SYSTOLIC BLOOD PRESSURE: 97 MMHG

## 2022-03-12 LAB
ALBUMIN SERPL ELPH-MCNC: 3.6 G/DL — SIGNIFICANT CHANGE UP (ref 3.3–5)
ALP SERPL-CCNC: 125 U/L — HIGH (ref 40–120)
ALT FLD-CCNC: 17 U/L — SIGNIFICANT CHANGE UP (ref 4–33)
ANION GAP SERPL CALC-SCNC: 16 MMOL/L — HIGH (ref 7–14)
APPEARANCE UR: ABNORMAL
AST SERPL-CCNC: 26 U/L — SIGNIFICANT CHANGE UP (ref 4–32)
BASOPHILS # BLD AUTO: 0.06 K/UL — SIGNIFICANT CHANGE UP (ref 0–0.2)
BASOPHILS NFR BLD AUTO: 0.4 % — SIGNIFICANT CHANGE UP (ref 0–2)
BILIRUB SERPL-MCNC: 0.3 MG/DL — SIGNIFICANT CHANGE UP (ref 0.2–1.2)
BILIRUB UR-MCNC: NEGATIVE — SIGNIFICANT CHANGE UP
BUN SERPL-MCNC: 10 MG/DL — SIGNIFICANT CHANGE UP (ref 7–23)
CALCIUM SERPL-MCNC: 10.5 MG/DL — SIGNIFICANT CHANGE UP (ref 8.4–10.5)
CHLORIDE SERPL-SCNC: 102 MMOL/L — SIGNIFICANT CHANGE UP (ref 98–107)
CO2 SERPL-SCNC: 19 MMOL/L — LOW (ref 22–31)
COLOR SPEC: YELLOW — SIGNIFICANT CHANGE UP
CREAT SERPL-MCNC: 0.59 MG/DL — SIGNIFICANT CHANGE UP (ref 0.5–1.3)
DIFF PNL FLD: NEGATIVE — SIGNIFICANT CHANGE UP
EGFR: 130 ML/MIN/1.73M2 — SIGNIFICANT CHANGE UP
EOSINOPHIL # BLD AUTO: 0.11 K/UL — SIGNIFICANT CHANGE UP (ref 0–0.5)
EOSINOPHIL NFR BLD AUTO: 0.8 % — SIGNIFICANT CHANGE UP (ref 0–6)
EPI CELLS # UR: SIGNIFICANT CHANGE UP
GLUCOSE SERPL-MCNC: 116 MG/DL — HIGH (ref 70–99)
GLUCOSE UR QL: NEGATIVE — SIGNIFICANT CHANGE UP
HCT VFR BLD CALC: 31.3 % — LOW (ref 34.5–45)
HGB BLD-MCNC: 10.5 G/DL — LOW (ref 11.5–15.5)
IANC: 11.15 K/UL — HIGH (ref 1.5–8.5)
IMM GRANULOCYTES NFR BLD AUTO: 1 % — SIGNIFICANT CHANGE UP (ref 0–1.5)
KETONES UR-MCNC: NEGATIVE — SIGNIFICANT CHANGE UP
LEUKOCYTE ESTERASE UR-ACNC: ABNORMAL
LIDOCAIN IGE QN: 51 U/L — SIGNIFICANT CHANGE UP (ref 7–60)
LYMPHOCYTES # BLD AUTO: 14.6 % — SIGNIFICANT CHANGE UP (ref 13–44)
LYMPHOCYTES # BLD AUTO: 2.13 K/UL — SIGNIFICANT CHANGE UP (ref 1–3.3)
MCHC RBC-ENTMCNC: 29.1 PG — SIGNIFICANT CHANGE UP (ref 27–34)
MCHC RBC-ENTMCNC: 33.5 GM/DL — SIGNIFICANT CHANGE UP (ref 32–36)
MCV RBC AUTO: 86.7 FL — SIGNIFICANT CHANGE UP (ref 80–100)
MONOCYTES # BLD AUTO: 1.01 K/UL — HIGH (ref 0–0.9)
MONOCYTES NFR BLD AUTO: 6.9 % — SIGNIFICANT CHANGE UP (ref 2–14)
NEUTROPHILS # BLD AUTO: 11.15 K/UL — HIGH (ref 1.8–7.4)
NEUTROPHILS NFR BLD AUTO: 76.3 % — SIGNIFICANT CHANGE UP (ref 43–77)
NITRITE UR-MCNC: NEGATIVE — SIGNIFICANT CHANGE UP
NRBC # BLD: 0 /100 WBCS — SIGNIFICANT CHANGE UP
NRBC # FLD: 0 K/UL — SIGNIFICANT CHANGE UP
PH UR: 7 — SIGNIFICANT CHANGE UP (ref 5–8)
PLATELET # BLD AUTO: 355 K/UL — SIGNIFICANT CHANGE UP (ref 150–400)
POTASSIUM SERPL-MCNC: 3.8 MMOL/L — SIGNIFICANT CHANGE UP (ref 3.5–5.3)
POTASSIUM SERPL-SCNC: 3.8 MMOL/L — SIGNIFICANT CHANGE UP (ref 3.5–5.3)
PROT SERPL-MCNC: 6.5 G/DL — SIGNIFICANT CHANGE UP (ref 6–8.3)
PROT UR-MCNC: ABNORMAL
RBC # BLD: 3.61 M/UL — LOW (ref 3.8–5.2)
RBC # FLD: 13.6 % — SIGNIFICANT CHANGE UP (ref 10.3–14.5)
SODIUM SERPL-SCNC: 137 MMOL/L — SIGNIFICANT CHANGE UP (ref 135–145)
SP GR SPEC: 1.01 — SIGNIFICANT CHANGE UP (ref 1–1.05)
TROPONIN T, HIGH SENSITIVITY RESULT: <6 NG/L — SIGNIFICANT CHANGE UP
UROBILINOGEN FLD QL: SIGNIFICANT CHANGE UP
WBC # BLD: 14.61 K/UL — HIGH (ref 3.8–10.5)
WBC # FLD AUTO: 14.61 K/UL — HIGH (ref 3.8–10.5)
WBC UR QL: 5 /HPF — SIGNIFICANT CHANGE UP (ref 0–5)

## 2022-03-12 PROCEDURE — 93010 ELECTROCARDIOGRAM REPORT: CPT

## 2022-03-12 PROCEDURE — 93970 EXTREMITY STUDY: CPT | Mod: 26

## 2022-03-12 PROCEDURE — 99285 EMERGENCY DEPT VISIT HI MDM: CPT | Mod: 25

## 2022-03-12 RX ORDER — ACETAMINOPHEN 500 MG
650 TABLET ORAL ONCE
Refills: 0 | Status: COMPLETED | OUTPATIENT
Start: 2022-03-12 | End: 2022-03-12

## 2022-03-12 RX ORDER — ONDANSETRON 8 MG/1
4 TABLET, FILM COATED ORAL ONCE
Refills: 0 | Status: COMPLETED | OUTPATIENT
Start: 2022-03-12 | End: 2022-03-12

## 2022-03-12 RX ORDER — CALCIUM CARBONATE 500(1250)
1 TABLET ORAL ONCE
Refills: 0 | Status: DISCONTINUED | OUTPATIENT
Start: 2022-03-12 | End: 2022-03-12

## 2022-03-12 RX ORDER — SODIUM CHLORIDE 9 MG/ML
1000 INJECTION INTRAMUSCULAR; INTRAVENOUS; SUBCUTANEOUS ONCE
Refills: 0 | Status: COMPLETED | OUTPATIENT
Start: 2022-03-12 | End: 2022-03-12

## 2022-03-12 RX ORDER — CALCIUM CARBONATE 500(1250)
2 TABLET ORAL ONCE
Refills: 0 | Status: COMPLETED | OUTPATIENT
Start: 2022-03-12 | End: 2022-03-12

## 2022-03-12 RX ORDER — FAMOTIDINE 10 MG/ML
20 INJECTION INTRAVENOUS ONCE
Refills: 0 | Status: COMPLETED | OUTPATIENT
Start: 2022-03-12 | End: 2022-03-12

## 2022-03-12 RX ORDER — FAMOTIDINE 10 MG/ML
1 INJECTION INTRAVENOUS
Qty: 10 | Refills: 0
Start: 2022-03-12 | End: 2022-03-16

## 2022-03-12 RX ADMIN — Medication 2 TABLET(S): at 10:02

## 2022-03-12 RX ADMIN — FAMOTIDINE 20 MILLIGRAM(S): 10 INJECTION INTRAVENOUS at 08:03

## 2022-03-12 RX ADMIN — Medication 30 MILLILITER(S): at 08:02

## 2022-03-12 RX ADMIN — Medication 650 MILLIGRAM(S): at 10:40

## 2022-03-12 RX ADMIN — ONDANSETRON 4 MILLIGRAM(S): 8 TABLET, FILM COATED ORAL at 10:41

## 2022-03-12 RX ADMIN — ONDANSETRON 4 MILLIGRAM(S): 8 TABLET, FILM COATED ORAL at 08:52

## 2022-03-12 RX ADMIN — SODIUM CHLORIDE 1000 MILLILITER(S): 9 INJECTION INTRAMUSCULAR; INTRAVENOUS; SUBCUTANEOUS at 10:41

## 2022-03-12 RX ADMIN — Medication 650 MILLIGRAM(S): at 09:38

## 2022-03-12 NOTE — ED PROVIDER NOTE - ATTENDING CONTRIBUTION TO CARE
Dr. 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  During episode pt felt like she had "trouble catching breath" but no SOB since.  No other N/V.  No diarrhea.  Pt still feels baby moving normally, no vaginal bleeding.  On ED presentation pt states that she feels back to baseline, has no acute complaints, including no CP/SOB, leg pain/swelling or abdominal pain.  Of note, pt states that she has had 2 similar episodes during this pregnancy, once at 20 weeks and once at approx 33 weeks.  Chart shows that on 2/14/22 pt presented for similar and had negative CTA chest.  Was told at that time that she may have "gastric pain".  On exam pt well appearing, in NAD, heart RRR, lungs CTAB, abd gravid and NTND with , extremities without swelling, negative calf swelling or TTP bilaterally, strength 5/5 in all extremities and skin without rash. Dr. 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  During episode pt felt like she had "trouble catching breath" but no SOB since.  No other N/V.  No diarrhea.  Pt still feels baby moving normally, no vaginal bleeding.  On ED presentation pt states that she feels back to baseline, has no acute complaints, including no CP/SOB, leg pain/swelling or abdominal pain.  Of note, pt states that she has had 2 similar episodes during this pregnancy, once at 20 weeks and once at approx 33 weeks.  Chart shows that on 2/14/22 pt presented for similar and had negative CTA chest.  Was told at that time that she may have "gastric pain".  On exam pt well appearing, in NAD, heart RRR, lungs CTAB, abd gravid and NTND with , extremities without swelling, negative calf swelling or TTP bilaterally, strength 5/5 in all extremities and skin without rash

## 2022-03-12 NOTE — ED PROVIDER NOTE - PATIENT PORTAL LINK FT
You can access the FollowMyHealth Patient Portal offered by [facility] by registering at the following website: [email or web address]. By joining [employer]’s FollowMyHealth portal, you will also be able to view your health information using other applications (apps) compatible with our system.

## 2022-03-12 NOTE — ED PROVIDER NOTE - OBJECTIVE STATEMENT
22 year-old female, no significant PMHx, , approx. 36 weeks gestation, presents with cc chest pain in the night time. In the last week felt fine. Last evening felt fine. Went to bed and couldn't sleep well. At ~ 3 AM gradual onset of epigastric/midsternal burning pain radiating to the throat and bilateral lower ribcage. Pain was continuous up until she vomited yellow large amount x 1. After the vomiting the chest pain resolved. Since then denies any chest pain. During the chest pain episode the patient denies any dizziness, palpitations, SOB or syncope. Reports usual bilateral heel pain at night but no new leg pain or swelling. No recent long travel or prolonged immobilization. Denies any complications during this pregnancy. Denies any abdominal pain, vaginal bleeding/leakage. Still feels baby move as usual. No recent illness or fever/chills/cough. Was evaluated fpr similar symptoms in the ED twice before with similar symptoms and she reports she was told it was a "gastric issue".

## 2022-03-12 NOTE — PROVIDER CONTACT NOTE (OTHER) - ASSESSMENT
Pt denies epigastric pain at this time, denies contractions, leakage of fluid, vaginal bleeding and reports positive fetal movement.   above umbilicus, moderate variability, no decelerations noted, positive accelerations, no contractions noted; category I tracing.

## 2022-03-12 NOTE — ED ADULT NURSE REASSESSMENT NOTE - NS ED NURSE REASSESS COMMENT FT1
Pt appears uncomfortable. Pt had an episode of poor responsiveness. Vitally stable, pt just not speaking. When awake pt moans in discomfort. MD made aware. Pt medicated with no relief. Will continue to monitor.

## 2022-03-12 NOTE — ED PROVIDER NOTE - PROGRESS NOTE DETAILS
ECG NSR. Labs grossly unremarkable. Trop negative. Bilateral dopplers negative. Started to have burning epigastric/midsternal pain again and feeling nauseated. When pain is severe reports feeling weak. Vitals stable. History and exam highly suggestive of GERD/gastritis. Patient reports that she ate spicy shetty at 10 PM last night. No SOB, dizziness or palpitations. Discuss patient presentation and findings with OB resident Dr Olivia Tyler. Plan: bedside NST, IVF, more meds, re-assess. Feeling much better after eating food. NST done by OB RN. History and presentation consistent with diagnosis of gastritis/GERD due to late night ingestion of spicy food. Discussed over the phone with OB resident. Plan: avoid spicy foods, pepcid Rx and follow up with Dr Myers. Plan to dc home. Pt is well appearing walking with steady gait, stable for discharge and follow up without fail with medical doctor. I had a detailed discussion with the patient and/or guardian regarding the historical points, exam findings, and any diagnostic results supporting the discharge diagnosis. Pt educated on care and need for follow up. Strict return instructions and red flag signs and symptoms discussed with patient. Questions answered. Pt shows understanding of discharge information and agrees to follow. NST RN confirmed with OB resident over phone that NST WNL. Will dc home.

## 2022-03-12 NOTE — ED ADULT TRIAGE NOTE - CHIEF COMPLAINT QUOTE
Pt arrives 36wk4d pregnant, CORNELIO , , OBGYN MD Cedeño. Pt arrives woken up with chest pain, SOB and N+Vx1 since 300. Family reports episode of "unresponsiveness" in bed x3 minutes PTA. Pt reports similar episode of symptoms, seen in ED a few weeks ago and dced home. PMHx gestational diabetes, . As per L&D GINO Lipscomb, to be seen in ED.

## 2022-03-12 NOTE — ED PROVIDER NOTE - CLINICAL SUMMARY MEDICAL DECISION MAKING FREE TEXT BOX
22 year-old female, presents with episodic burning chest pain at 3 AM that resolved after 1 episode of NBNB vomiting. Well-appearing, asymptomatic, no tachycardia. No signs of infection. Low clinical suspicion for ACS/dissection/PTX/PNA. History and exam suggestive of GERD as cause of symptoms. Low suspicion of PE. Recent ED visit with negative CTA. Plan: labs, urine, Doppler, re-assess.

## 2022-03-12 NOTE — ED PROVIDER NOTE - NSFOLLOWUPINSTRUCTIONS_ED_ALL_ED_FT
Avoid spicy foods.  Limit citrus, chocolate, mint, tomato-based foods for the next 1 week.  Follow up with your OBGYN within 2-3 days.  If you experience any new or worsening symptoms or if you are concerned you can always come back to the emergency for a re-evaluation.  If there were any prescriptions given to you during the visit today take them as prescribed. If you have any questions you can ask the pharmacist.

## 2022-03-12 NOTE — ED ADULT NURSE NOTE - OBJECTIVE STATEMENT
Pt received in rm #26, 22Y F Aox4, ambulatory. Pt approx 36 weeks pregnant. Pt c/o chest pain, sob and n/v starting around 3AM today. Pt arrives with  at bedside. Respirations even and unlabored, pending MD lundberg. Will continue to monitor. Pt received in rm #26, 22Y F Aox4, ambulatory. Pt approx 36 weeks pregnant. Pt c/o chest pain, sob and n/v starting around 3AM today. Pt arrives with  at bedside. Respirations even and unlabored, pending MD lundberg. NSR on cardiac monitor, VS as charted, Will continue to monitor. Pt received in rm #26, 22Y F Aox4, ambulatory. Pt approx 36 weeks pregnant. Pt c/o chest pain, sob and n/v starting around 3AM today. Pt arrives with  at bedside. Respirations even and unlabored, pending MD lundberg. NSR on cardiac monitor, VS as charted, BP as charted NP aware. Pt denies any dizziness, weakness, lightheadedness, vision changes. 20G IV placed rt arm, labs sent, Will continue to monitor.

## 2022-03-14 ENCOUNTER — RESULT REVIEW (OUTPATIENT)
Age: 22
End: 2022-03-14

## 2022-03-14 ENCOUNTER — OUTPATIENT (OUTPATIENT)
Dept: OUTPATIENT SERVICES | Facility: HOSPITAL | Age: 22
LOS: 1 days | End: 2022-03-14
Payer: MEDICAID

## 2022-03-14 ENCOUNTER — APPOINTMENT (OUTPATIENT)
Dept: OBGYN | Facility: HOSPITAL | Age: 22
End: 2022-03-14

## 2022-03-14 ENCOUNTER — ASOB RESULT (OUTPATIENT)
Age: 22
End: 2022-03-14

## 2022-03-14 ENCOUNTER — APPOINTMENT (OUTPATIENT)
Dept: ANTEPARTUM | Facility: CLINIC | Age: 22
End: 2022-03-14
Payer: MEDICAID

## 2022-03-14 VITALS
SYSTOLIC BLOOD PRESSURE: 110 MMHG | WEIGHT: 157 LBS | DIASTOLIC BLOOD PRESSURE: 59 MMHG | HEIGHT: 61 IN | HEART RATE: 76 BPM | BODY MASS INDEX: 29.64 KG/M2 | TEMPERATURE: 98.1 F

## 2022-03-14 DIAGNOSIS — Z34.93 ENCOUNTER FOR SUPERVISION OF NORMAL PREGNANCY, UNSPECIFIED, THIRD TRIMESTER: ICD-10-CM

## 2022-03-14 DIAGNOSIS — O24.419 GESTATIONAL DIABETES MELLITUS IN PREGNANCY, UNSPECIFIED CONTROL: ICD-10-CM

## 2022-03-14 DIAGNOSIS — E55.9 VITAMIN D DEFICIENCY, UNSPECIFIED: ICD-10-CM

## 2022-03-14 LAB
BASOPHILS # BLD AUTO: 0.06 K/UL — SIGNIFICANT CHANGE UP (ref 0–0.2)
BASOPHILS NFR BLD AUTO: 0.5 % — SIGNIFICANT CHANGE UP (ref 0–2)
EOSINOPHIL # BLD AUTO: 0.23 K/UL — SIGNIFICANT CHANGE UP (ref 0–0.5)
EOSINOPHIL NFR BLD AUTO: 2 % — SIGNIFICANT CHANGE UP (ref 0–6)
HCT VFR BLD CALC: 33.5 % — LOW (ref 34.5–45)
HGB BLD-MCNC: 10.9 G/DL — LOW (ref 11.5–15.5)
HIV 1+2 AB+HIV1 P24 AG SERPL QL IA: SIGNIFICANT CHANGE UP
IANC: 7.25 K/UL — SIGNIFICANT CHANGE UP (ref 1.5–8.5)
IMM GRANULOCYTES NFR BLD AUTO: 1.1 % — SIGNIFICANT CHANGE UP (ref 0–1.5)
LYMPHOCYTES # BLD AUTO: 2.92 K/UL — SIGNIFICANT CHANGE UP (ref 1–3.3)
LYMPHOCYTES # BLD AUTO: 25.3 % — SIGNIFICANT CHANGE UP (ref 13–44)
MCHC RBC-ENTMCNC: 28.5 PG — SIGNIFICANT CHANGE UP (ref 27–34)
MCHC RBC-ENTMCNC: 32.5 GM/DL — SIGNIFICANT CHANGE UP (ref 32–36)
MCV RBC AUTO: 87.5 FL — SIGNIFICANT CHANGE UP (ref 80–100)
MONOCYTES # BLD AUTO: 0.97 K/UL — HIGH (ref 0–0.9)
MONOCYTES NFR BLD AUTO: 8.4 % — SIGNIFICANT CHANGE UP (ref 2–14)
NEUTROPHILS # BLD AUTO: 7.25 K/UL — SIGNIFICANT CHANGE UP (ref 1.8–7.4)
NEUTROPHILS NFR BLD AUTO: 62.7 % — SIGNIFICANT CHANGE UP (ref 43–77)
NRBC # BLD: 0 /100 WBCS — SIGNIFICANT CHANGE UP
NRBC # FLD: 0 K/UL — SIGNIFICANT CHANGE UP
PLATELET # BLD AUTO: 379 K/UL — SIGNIFICANT CHANGE UP (ref 150–400)
RBC # BLD: 3.83 M/UL — SIGNIFICANT CHANGE UP (ref 3.8–5.2)
RBC # FLD: 13.8 % — SIGNIFICANT CHANGE UP (ref 10.3–14.5)
WBC # BLD: 11.56 K/UL — HIGH (ref 3.8–10.5)
WBC # FLD AUTO: 11.56 K/UL — HIGH (ref 3.8–10.5)

## 2022-03-14 PROCEDURE — 76818 FETAL BIOPHYS PROFILE W/NST: CPT | Mod: 26

## 2022-03-14 PROCEDURE — 86077 PHYS BLOOD BANK SERV XMATCH: CPT

## 2022-03-15 LAB — T PALLIDUM AB TITR SER: NEGATIVE — SIGNIFICANT CHANGE UP

## 2022-03-16 ENCOUNTER — NON-APPOINTMENT (OUTPATIENT)
Age: 22
End: 2022-03-16

## 2022-03-16 LAB
C TRACH RRNA SPEC QL NAA+PROBE: SIGNIFICANT CHANGE UP
GROUP B BETA STREP DNA (PCR): DETECTED
GROUP B BETA STREP INTERPRETATION: SIGNIFICANT CHANGE UP
N GONORRHOEA RRNA SPEC QL NAA+PROBE: SIGNIFICANT CHANGE UP
SOURCE GROUP B STREP: SIGNIFICANT CHANGE UP
SPECIMEN SOURCE: SIGNIFICANT CHANGE UP

## 2022-03-17 ENCOUNTER — NON-APPOINTMENT (OUTPATIENT)
Age: 22
End: 2022-03-17

## 2022-03-21 ENCOUNTER — ASOB RESULT (OUTPATIENT)
Age: 22
End: 2022-03-21

## 2022-03-21 ENCOUNTER — APPOINTMENT (OUTPATIENT)
Dept: OBGYN | Facility: HOSPITAL | Age: 22
End: 2022-03-21

## 2022-03-21 ENCOUNTER — NON-APPOINTMENT (OUTPATIENT)
Age: 22
End: 2022-03-21

## 2022-03-21 ENCOUNTER — APPOINTMENT (OUTPATIENT)
Dept: ANTEPARTUM | Facility: CLINIC | Age: 22
End: 2022-03-21
Payer: MEDICAID

## 2022-03-21 ENCOUNTER — INPATIENT (INPATIENT)
Facility: HOSPITAL | Age: 22
LOS: 3 days | Discharge: HOME | End: 2022-03-25
Attending: SPECIALIST | Admitting: SPECIALIST
Payer: MEDICAID

## 2022-03-21 VITALS — DIASTOLIC BLOOD PRESSURE: 63 MMHG | SYSTOLIC BLOOD PRESSURE: 107 MMHG | TEMPERATURE: 99 F | HEART RATE: 71 BPM

## 2022-03-21 DIAGNOSIS — O26.899 OTHER SPECIFIED PREGNANCY RELATED CONDITIONS, UNSPECIFIED TRIMESTER: ICD-10-CM

## 2022-03-21 DIAGNOSIS — Z3A.00 WEEKS OF GESTATION OF PREGNANCY NOT SPECIFIED: ICD-10-CM

## 2022-03-21 LAB
BASOPHILS # BLD AUTO: 0.05 K/UL — SIGNIFICANT CHANGE UP (ref 0–0.2)
BASOPHILS NFR BLD AUTO: 0.4 % — SIGNIFICANT CHANGE UP (ref 0–2)
BLD GP AB SCN SERPL QL: NEGATIVE — SIGNIFICANT CHANGE UP
COVID-19 SPIKE DOMAIN AB INTERP: POSITIVE
COVID-19 SPIKE DOMAIN ANTIBODY RESULT: >250 U/ML — HIGH
EOSINOPHIL # BLD AUTO: 0.19 K/UL — SIGNIFICANT CHANGE UP (ref 0–0.5)
EOSINOPHIL NFR BLD AUTO: 1.6 % — SIGNIFICANT CHANGE UP (ref 0–6)
HCT VFR BLD CALC: 33.6 % — LOW (ref 34.5–45)
HGB BLD-MCNC: 10.7 G/DL — LOW (ref 11.5–15.5)
IANC: 7.66 K/UL — SIGNIFICANT CHANGE UP (ref 1.5–8.5)
IMM GRANULOCYTES NFR BLD AUTO: 1.4 % — SIGNIFICANT CHANGE UP (ref 0–1.5)
LYMPHOCYTES # BLD AUTO: 2.82 K/UL — SIGNIFICANT CHANGE UP (ref 1–3.3)
LYMPHOCYTES # BLD AUTO: 24 % — SIGNIFICANT CHANGE UP (ref 13–44)
MCHC RBC-ENTMCNC: 28.2 PG — SIGNIFICANT CHANGE UP (ref 27–34)
MCHC RBC-ENTMCNC: 31.8 GM/DL — LOW (ref 32–36)
MCV RBC AUTO: 88.4 FL — SIGNIFICANT CHANGE UP (ref 80–100)
MONOCYTES # BLD AUTO: 0.88 K/UL — SIGNIFICANT CHANGE UP (ref 0–0.9)
MONOCYTES NFR BLD AUTO: 7.5 % — SIGNIFICANT CHANGE UP (ref 2–14)
NEUTROPHILS # BLD AUTO: 7.66 K/UL — HIGH (ref 1.8–7.4)
NEUTROPHILS NFR BLD AUTO: 65.1 % — SIGNIFICANT CHANGE UP (ref 43–77)
NRBC # BLD: 0 /100 WBCS — SIGNIFICANT CHANGE UP
NRBC # FLD: 0 K/UL — SIGNIFICANT CHANGE UP
PLATELET # BLD AUTO: 376 K/UL — SIGNIFICANT CHANGE UP (ref 150–400)
RBC # BLD: 3.8 M/UL — SIGNIFICANT CHANGE UP (ref 3.8–5.2)
RBC # FLD: 13.8 % — SIGNIFICANT CHANGE UP (ref 10.3–14.5)
RH IG SCN BLD-IMP: NEGATIVE — SIGNIFICANT CHANGE UP
SARS-COV-2 IGG+IGM SERPL QL IA: >250 U/ML — HIGH
SARS-COV-2 IGG+IGM SERPL QL IA: POSITIVE
SARS-COV-2 RNA SPEC QL NAA+PROBE: SIGNIFICANT CHANGE UP
WBC # BLD: 11.76 K/UL — HIGH (ref 3.8–10.5)
WBC # FLD AUTO: 11.76 K/UL — HIGH (ref 3.8–10.5)

## 2022-03-21 PROCEDURE — 76818 FETAL BIOPHYS PROFILE W/NST: CPT | Mod: 26

## 2022-03-21 RX ORDER — AMPICILLIN TRIHYDRATE 250 MG
2 CAPSULE ORAL ONCE
Refills: 0 | Status: COMPLETED | OUTPATIENT
Start: 2022-03-21 | End: 2022-03-21

## 2022-03-21 RX ORDER — CITRIC ACID/SODIUM CITRATE 300-500 MG
15 SOLUTION, ORAL ORAL EVERY 6 HOURS
Refills: 0 | Status: DISCONTINUED | OUTPATIENT
Start: 2022-03-21 | End: 2022-03-22

## 2022-03-21 RX ORDER — SODIUM CHLORIDE 9 MG/ML
1000 INJECTION, SOLUTION INTRAVENOUS
Refills: 0 | Status: DISCONTINUED | OUTPATIENT
Start: 2022-03-21 | End: 2022-03-22

## 2022-03-21 RX ORDER — OXYTOCIN 10 UNIT/ML
333.33 VIAL (ML) INJECTION
Qty: 20 | Refills: 0 | Status: DISCONTINUED | OUTPATIENT
Start: 2022-03-21 | End: 2022-03-25

## 2022-03-21 RX ORDER — SODIUM CHLORIDE 9 MG/ML
1000 INJECTION, SOLUTION INTRAVENOUS ONCE
Refills: 0 | Status: COMPLETED | OUTPATIENT
Start: 2022-03-21 | End: 2022-03-21

## 2022-03-21 RX ORDER — AMPICILLIN TRIHYDRATE 250 MG
1 CAPSULE ORAL EVERY 4 HOURS
Refills: 0 | Status: DISCONTINUED | OUTPATIENT
Start: 2022-03-21 | End: 2022-03-22

## 2022-03-21 RX ADMIN — SODIUM CHLORIDE 125 MILLILITER(S): 9 INJECTION, SOLUTION INTRAVENOUS at 19:08

## 2022-03-21 RX ADMIN — Medication 216 GRAM(S): at 15:34

## 2022-03-21 RX ADMIN — Medication 108 GRAM(S): at 23:43

## 2022-03-21 RX ADMIN — SODIUM CHLORIDE 1000 MILLILITER(S): 9 INJECTION, SOLUTION INTRAVENOUS at 20:45

## 2022-03-21 RX ADMIN — Medication 108 GRAM(S): at 19:45

## 2022-03-21 NOTE — OB RN TRIAGE NOTE - FALL HARM RISK - UNIVERSAL INTERVENTIONS
Bed in lowest position, wheels locked, appropriate side rails in place/Call bell, personal items and telephone in reach/Instruct patient to call for assistance before getting out of bed or chair/Non-slip footwear when patient is out of bed/[place] to call system/Physically safe environment - no spills, clutter or unnecessary equipment/Purposeful Proactive Rounding/Room/bathroom lighting operational, light cord in reach

## 2022-03-21 NOTE — CHART NOTE - NSCHARTNOTEFT_GEN_A_CORE
2100    House officer at the bedside to discuss plan of care with pt and plan for CRB placement given she got her Epi. Patient had requested only female clinicians. Discussed that such an accomodation can be made as long as the health of the baby and mother are reassuring. However, counseled patient and partner extensively that in the event of an emergency, there may be only male clinicians available. Patient and partner verbalized understanding. All questions were answered to the patient's apparent satisfaction.    Scott Jmienez  PGY-1, Obstetrics & Gynecology

## 2022-03-21 NOTE — OB PROVIDER H&P - HISTORY OF PRESENT ILLNESS
23yo  at 38 weeks being sent in from ATU with NRNST and BPP  (no movement). Patient reports decreased fetal movement since this morning. Denies CTX, VB, LOF.    PNC: [facility]  - A2GDM  - GBS positive  - ATU 3/11: vtx, anterior, AFV wnl, EFW 2925 (49%ile)    OBHx: current  GynHx: denies fibroids, cysts, abnormal pap smears, STIs  PMH: denies  PSH: denies  Social: denies anxiety/depression  Meds: PNV, Basaglar 12u qhs, Fe, Vit D, PNV, denies use of ASA   All: NKDA

## 2022-03-21 NOTE — OB RN PATIENT PROFILE - FALL HARM RISK - UNIVERSAL INTERVENTIONS
Bed in lowest position, wheels locked, appropriate side rails in place/Call bell, personal items and telephone in reach/Instruct patient to call for assistance before getting out of bed or chair/Non-slip footwear when patient is out of bed/[place] to call system/Physically safe environment - no spills, clutter or unnecessary equipment/Purposeful Proactive Rounding/Room/bathroom lighting operational, light cord in reach

## 2022-03-21 NOTE — OB PROVIDER H&P - ASSESSMENT
23yo  at 38 weeks admitted for IOL 2/2 decreased fetal movement with NRNST and BPP 6/10. Pregnancy complicated by A2GDM    - admit to L&D  - IOL with buccal cytotec and CB  - covid  - anesthesia  - rotating IVF, fingersticks  - admission labs    C. Diamant, PGY-3  d/w Dr. Enamorado

## 2022-03-21 NOTE — OB PROVIDER H&P - NSHPPHYSICALEXAM_GEN_ALL_CORE
Gen: NAD  Resp: unlabored on RA  CV: RR  GI: soft, nontender, gravid    FHT: 135, mod, + accels, - decels  Logan: no CTX  SVE: 0/50/-3    BPP: 8/8, vertex, anterior, MVP 3.56

## 2022-03-22 LAB
ANION GAP SERPL CALC-SCNC: 12 MMOL/L — SIGNIFICANT CHANGE UP (ref 7–14)
BUN SERPL-MCNC: 6 MG/DL — LOW (ref 7–23)
CALCIUM SERPL-MCNC: 8.6 MG/DL — SIGNIFICANT CHANGE UP (ref 8.4–10.5)
CHLORIDE SERPL-SCNC: 106 MMOL/L — SIGNIFICANT CHANGE UP (ref 98–107)
CO2 SERPL-SCNC: 20 MMOL/L — LOW (ref 22–31)
CREAT SERPL-MCNC: 0.63 MG/DL — SIGNIFICANT CHANGE UP (ref 0.5–1.3)
EGFR: 128 ML/MIN/1.73M2 — SIGNIFICANT CHANGE UP
GLUCOSE SERPL-MCNC: 88 MG/DL — SIGNIFICANT CHANGE UP (ref 70–99)
HCT VFR BLD CALC: 30.1 % — LOW (ref 34.5–45)
HGB BLD-MCNC: 9.9 G/DL — LOW (ref 11.5–15.5)
LACTATE SERPL-SCNC: 2 MMOL/L — SIGNIFICANT CHANGE UP (ref 0.5–2)
LACTATE SERPL-SCNC: 2.2 MMOL/L — HIGH (ref 0.5–2)
MAGNESIUM SERPL-MCNC: 1.6 MG/DL — SIGNIFICANT CHANGE UP (ref 1.6–2.6)
MCHC RBC-ENTMCNC: 28.7 PG — SIGNIFICANT CHANGE UP (ref 27–34)
MCHC RBC-ENTMCNC: 32.9 GM/DL — SIGNIFICANT CHANGE UP (ref 32–36)
MCV RBC AUTO: 87.2 FL — SIGNIFICANT CHANGE UP (ref 80–100)
NRBC # BLD: 0 /100 WBCS — SIGNIFICANT CHANGE UP
NRBC # FLD: 0 K/UL — SIGNIFICANT CHANGE UP
PHOSPHATE SERPL-MCNC: 3.4 MG/DL — SIGNIFICANT CHANGE UP (ref 2.5–4.5)
PLATELET # BLD AUTO: 330 K/UL — SIGNIFICANT CHANGE UP (ref 150–400)
POTASSIUM SERPL-MCNC: 3.5 MMOL/L — SIGNIFICANT CHANGE UP (ref 3.5–5.3)
POTASSIUM SERPL-SCNC: 3.5 MMOL/L — SIGNIFICANT CHANGE UP (ref 3.5–5.3)
RBC # BLD: 3.45 M/UL — LOW (ref 3.8–5.2)
RBC # FLD: 13.4 % — SIGNIFICANT CHANGE UP (ref 10.3–14.5)
SODIUM SERPL-SCNC: 138 MMOL/L — SIGNIFICANT CHANGE UP (ref 135–145)
T PALLIDUM AB TITR SER: NEGATIVE — SIGNIFICANT CHANGE UP
TROPONIN T, HIGH SENSITIVITY RESULT: <6 NG/L — SIGNIFICANT CHANGE UP
WBC # BLD: 18.59 K/UL — HIGH (ref 3.8–10.5)
WBC # FLD AUTO: 18.59 K/UL — HIGH (ref 3.8–10.5)

## 2022-03-22 PROCEDURE — 59409 OBSTETRICAL CARE: CPT | Mod: U9,UB,GC

## 2022-03-22 PROCEDURE — 93010 ELECTROCARDIOGRAM REPORT: CPT

## 2022-03-22 RX ORDER — OXYCODONE HYDROCHLORIDE 5 MG/1
5 TABLET ORAL ONCE
Refills: 0 | Status: DISCONTINUED | OUTPATIENT
Start: 2022-03-22 | End: 2022-03-25

## 2022-03-22 RX ORDER — CITRIC ACID/SODIUM CITRATE 300-500 MG
30 SOLUTION, ORAL ORAL ONCE
Refills: 0 | Status: DISCONTINUED | OUTPATIENT
Start: 2022-03-22 | End: 2022-03-22

## 2022-03-22 RX ORDER — SIMETHICONE 80 MG/1
80 TABLET, CHEWABLE ORAL EVERY 4 HOURS
Refills: 0 | Status: DISCONTINUED | OUTPATIENT
Start: 2022-03-22 | End: 2022-03-25

## 2022-03-22 RX ORDER — ACETAMINOPHEN 500 MG
975 TABLET ORAL
Refills: 0 | Status: DISCONTINUED | OUTPATIENT
Start: 2022-03-22 | End: 2022-03-25

## 2022-03-22 RX ORDER — BENZOCAINE 10 %
1 GEL (GRAM) MUCOUS MEMBRANE EVERY 6 HOURS
Refills: 0 | Status: DISCONTINUED | OUTPATIENT
Start: 2022-03-22 | End: 2022-03-25

## 2022-03-22 RX ORDER — DIBUCAINE 1 %
1 OINTMENT (GRAM) RECTAL EVERY 6 HOURS
Refills: 0 | Status: DISCONTINUED | OUTPATIENT
Start: 2022-03-22 | End: 2022-03-25

## 2022-03-22 RX ORDER — SODIUM CHLORIDE 9 MG/ML
3 INJECTION INTRAMUSCULAR; INTRAVENOUS; SUBCUTANEOUS EVERY 8 HOURS
Refills: 0 | Status: DISCONTINUED | OUTPATIENT
Start: 2022-03-22 | End: 2022-03-25

## 2022-03-22 RX ORDER — MAGNESIUM HYDROXIDE 400 MG/1
30 TABLET, CHEWABLE ORAL
Refills: 0 | Status: DISCONTINUED | OUTPATIENT
Start: 2022-03-22 | End: 2022-03-25

## 2022-03-22 RX ORDER — IBUPROFEN 200 MG
600 TABLET ORAL EVERY 6 HOURS
Refills: 0 | Status: DISCONTINUED | OUTPATIENT
Start: 2022-03-22 | End: 2022-03-25

## 2022-03-22 RX ORDER — PRAMOXINE HYDROCHLORIDE 150 MG/15G
1 AEROSOL, FOAM RECTAL EVERY 4 HOURS
Refills: 0 | Status: DISCONTINUED | OUTPATIENT
Start: 2022-03-22 | End: 2022-03-25

## 2022-03-22 RX ORDER — IBUPROFEN 200 MG
600 TABLET ORAL EVERY 6 HOURS
Refills: 0 | Status: COMPLETED | OUTPATIENT
Start: 2022-03-22 | End: 2023-02-18

## 2022-03-22 RX ORDER — OXYTOCIN 10 UNIT/ML
2 VIAL (ML) INJECTION
Qty: 30 | Refills: 0 | Status: DISCONTINUED | OUTPATIENT
Start: 2022-03-22 | End: 2022-03-22

## 2022-03-22 RX ORDER — OXYTOCIN 10 UNIT/ML
333.33 VIAL (ML) INJECTION
Qty: 20 | Refills: 0 | Status: DISCONTINUED | OUTPATIENT
Start: 2022-03-22 | End: 2022-03-25

## 2022-03-22 RX ORDER — FAMOTIDINE 10 MG/ML
20 INJECTION INTRAVENOUS ONCE
Refills: 0 | Status: COMPLETED | OUTPATIENT
Start: 2022-03-22 | End: 2022-03-22

## 2022-03-22 RX ORDER — OXYCODONE HYDROCHLORIDE 5 MG/1
5 TABLET ORAL
Refills: 0 | Status: DISCONTINUED | OUTPATIENT
Start: 2022-03-22 | End: 2022-03-25

## 2022-03-22 RX ORDER — KETOROLAC TROMETHAMINE 30 MG/ML
30 SYRINGE (ML) INJECTION ONCE
Refills: 0 | Status: DISCONTINUED | OUTPATIENT
Start: 2022-03-22 | End: 2022-03-25

## 2022-03-22 RX ORDER — DIPHENHYDRAMINE HCL 50 MG
25 CAPSULE ORAL EVERY 6 HOURS
Refills: 0 | Status: DISCONTINUED | OUTPATIENT
Start: 2022-03-22 | End: 2022-03-25

## 2022-03-22 RX ORDER — TETANUS TOXOID, REDUCED DIPHTHERIA TOXOID AND ACELLULAR PERTUSSIS VACCINE, ADSORBED 5; 2.5; 8; 8; 2.5 [IU]/.5ML; [IU]/.5ML; UG/.5ML; UG/.5ML; UG/.5ML
0.5 SUSPENSION INTRAMUSCULAR ONCE
Refills: 0 | Status: DISCONTINUED | OUTPATIENT
Start: 2022-03-22 | End: 2022-03-25

## 2022-03-22 RX ORDER — AER TRAVELER 0.5 G/1
1 SOLUTION RECTAL; TOPICAL EVERY 4 HOURS
Refills: 0 | Status: DISCONTINUED | OUTPATIENT
Start: 2022-03-22 | End: 2022-03-25

## 2022-03-22 RX ORDER — LANOLIN
1 OINTMENT (GRAM) TOPICAL EVERY 6 HOURS
Refills: 0 | Status: DISCONTINUED | OUTPATIENT
Start: 2022-03-22 | End: 2022-03-25

## 2022-03-22 RX ORDER — HYDROCORTISONE 1 %
1 OINTMENT (GRAM) TOPICAL EVERY 6 HOURS
Refills: 0 | Status: DISCONTINUED | OUTPATIENT
Start: 2022-03-22 | End: 2022-03-25

## 2022-03-22 RX ADMIN — Medication 2 MILLIUNIT(S)/MIN: at 04:36

## 2022-03-22 RX ADMIN — FAMOTIDINE 20 MILLIGRAM(S): 10 INJECTION INTRAVENOUS at 19:54

## 2022-03-22 RX ADMIN — Medication 600 MILLIGRAM(S): at 18:20

## 2022-03-22 RX ADMIN — Medication 2 MILLIUNIT(S)/MIN: at 10:33

## 2022-03-22 RX ADMIN — Medication 600 MILLIGRAM(S): at 23:30

## 2022-03-22 RX ADMIN — Medication 2 MILLIUNIT(S)/MIN: at 01:55

## 2022-03-22 RX ADMIN — Medication 108 GRAM(S): at 03:50

## 2022-03-22 RX ADMIN — Medication 108 GRAM(S): at 08:00

## 2022-03-22 RX ADMIN — SODIUM CHLORIDE 3 MILLILITER(S): 9 INJECTION INTRAMUSCULAR; INTRAVENOUS; SUBCUTANEOUS at 20:01

## 2022-03-22 RX ADMIN — Medication 975 MILLIGRAM(S): at 19:54

## 2022-03-22 RX ADMIN — Medication 600 MILLIGRAM(S): at 17:48

## 2022-03-22 RX ADMIN — Medication 0.25 MILLIGRAM(S): at 07:48

## 2022-03-22 RX ADMIN — Medication 108 GRAM(S): at 11:45

## 2022-03-22 RX ADMIN — Medication 975 MILLIGRAM(S): at 20:20

## 2022-03-22 NOTE — OB RN DELIVERY SUMMARY - NS_LABORCHARACTER_OBGYN_ALL_OB
oral Induction of labor-AROM/Induction of labor-Medicinal/Internal electronic FM/External electronic FM/Antibiotics in labor

## 2022-03-22 NOTE — CHART NOTE - NSCHARTNOTEFT_GEN_A_CORE
Pt anterior lips since 750am, exam for labor progress assessment.  Pitocin was paused for decel at time of exam, FHT reassuring for 2 hours.    Patient exam unchanged 9.5/100/0.  Restart Pitocin.    Amyeo Afroz Jereen, PGY-1  d/w OB Team

## 2022-03-22 NOTE — OB RN DELIVERY SUMMARY - NSSELHIDDEN_OBGYN_ALL_OB_FT
[NS_DeliveryAttending1_OBGYN_ALL_OB_FT:[id number]],[NS_DeliveryRN_OBGYN_ALL_OB_FT:[id number]],[NS_CirculateRN2_OBGYN_ALL_OB_FT:[id number]]

## 2022-03-22 NOTE — PROVIDER CONTACT NOTE (CHANGE IN STATUS NOTIFICATION) - SITUATION
Finger stick 165
Pt fainted while walking back to bed after using bathroom. Pt gently fell to knees while being supported by RN.

## 2022-03-22 NOTE — CHART NOTE - NSCHARTNOTEFT_GEN_A_CORE
R3 Note    Overheard page of rapid response to 623T    On arrival to postpartum room, report received from nursing team that patient had a syncopal episode upon standing to use bathroom.  Patient had been moved to wheelchair and in process of being moved to bed.  Patient stating she feels dizzy and reports chest pain.  Symptoms began occurring approx. 10 min ago.      Gen: diaphoretic, pale  Abd: soft, non-tender, firm fundus  : appropriate lochia on pad, perineal exam w/o evidence of hematoma   TAUS: no free fluid within the pelvis, uterus w/ appropriate distension of heterogeneous debris for postpartum state. Remainder of FAST exam performed by RRT team, wnl.  EKG: partial right bundle branch block    A/P: 23 yo  PPD#0 s/p VAVD,  w/ second degree laceration w/ syncopal episode on ambulation to bathroom on postpartum floor, found to be hypotensive.    -second IV placed  -LR bolus x2 running  -stat labs: CBC, CMP, PT/PTT/INR, Lactate, Troponins  -cycle BPs q15 min to monitor for vital stability  -FAST scan negative  -bleeding appropriate postpartum    Dr. Asif present at bedside  [facility] PGY3 R3 Note    Overheard page of rapid response to 623T    On arrival to postpartum room, report received from nursing team that patient had a syncopal episode upon standing to use bathroom.  Patient had been moved to wheelchair and in process of being moved to bed.  Patient previously out of bed and voided x2.  Patient stating she feels dizzy and reports chest pain.  Symptoms began occurring approx. 10 min ago.      Vital Signs Last 24 Hrs  T(C): 37.7 (22 Mar 2022 16:00), Max: 37.7 (22 Mar 2022 16:00)  T(F): 99.8 (22 Mar 2022 16:00), Max: 99.8 (22 Mar 2022 16:00)  HR: 87 (22 Mar 2022 16:00) (56 - 123)  BP: 110/62 (22 Mar 2022 16:00) (82/47 - 128/67)  BP(mean): --  RR: 18 (22 Mar 2022 16:00) (18 - 18)  SpO2: 99% (22 Mar 2022 16:00) (88% - 100%)    Gen: diaphoretic, pale  Abd: soft, non-tender, firm fundus  : appropriate lochia on pad, perineal exam w/o evidence of hematoma   TAUS: no free fluid within the pelvis, uterus w/ appropriate distension of heterogeneous debris for postpartum state. Remainder of FAST exam performed by RRT team, wnl.  EKG: partial right bundle branch block    A/P: 21 yo  PPD#0 s/p VAVD,  w/ second degree laceration w/ syncopal episode on ambulation to bathroom on postpartum floor, found to be hypotensive.    -second IV placed  -LR bolus x2 running  -stat labs: CBC, CMP, PT/PTT/INR, Lactate, Troponins  -cycle BPs q15 min to monitor for vital stability  -FAST scan negative  -bleeding appropriate postpartum    Dr. Asif present at bedside  [facility] PGY3 R3 Note    Overheard page of rapid response to 623T    On arrival to postpartum room, report received from nursing team that patient had a syncopal episode upon standing to use bathroom.  Patient had been moved to wheelchair and in process of being moved to bed.  Patient previously out of bed and voided x2.  Patient stating she feels dizzy and reports chest pain.  Symptoms began occurring approx. 10 min ago.      Vital Signs Last 24 Hrs  T(C): 37.7 (22 Mar 2022 16:00), Max: 37.7 (22 Mar 2022 16:00)  T(F): 99.8 (22 Mar 2022 16:00), Max: 99.8 (22 Mar 2022 16:00)  HR: 87 (22 Mar 2022 16:00) (56 - 123)  BP: 110/62 (22 Mar 2022 16:00) (82/47 - 128/67)  BP(mean): --  RR: 18 (22 Mar 2022 16:00) (18 - 18)  SpO2: 99% (22 Mar 2022 16:00) (88% - 100%)    Gen: diaphoretic, pale  Abd: soft, non-tender, firm fundus  : appropriate lochia on pad, perineal exam w/o evidence of hematoma   TAUS: no free fluid within the pelvis, uterus w/ appropriate distension of heterogeneous debris for postpartum state. Remainder of FAST exam performed by RRT team, wnl.  EKG: partial right bundle branch block    A/P: 21 yo  PPD#0 s/p VAVD,  w/ second degree laceration w/ syncopal episode on ambulation to bathroom on postpartum floor, found to be hypotensive.    -second IV placed  -LR bolus x2 running  -stat labs: CBC, CMP, PT/PTT/INR, Lactate, Troponins  -cycle BPs q15 min to monitor for vital stability  -FAST scan negative  -bleeding appropriate postpartum    Dr. Asif present at bedside  [facility] [place] PGY3    OB Attending    P1 PPD #0 s/p VAVD with witness syncopal episode upon first attempt of standing  RRT called. Patient with hypotension responsive to IVF resuscitation  Abdomino-pelvic exam and TAUS wnl no signs of active bleeding  Likely postural syncope/hypovolemia  - plan of care as above    NATALIE Asif MD

## 2022-03-22 NOTE — PROVIDER CONTACT NOTE (CHANGE IN STATUS NOTIFICATION) - BACKGROUND
Pt s/p Vacuum assist vaginal delivery with history of GDMA2
Pt s/p vacuum assist vaginal delivery 3/22/2022

## 2022-03-22 NOTE — PROVIDER CONTACT NOTE (CHANGE IN STATUS NOTIFICATION) - ASSESSMENT
VS as per flow sheet. CBC, Pt/PTT.fibrinogen, troponin, BMP, Lactate drawn. IV RL 1000cc bolus up at 1850. @ 1850. 2nd IV started and running at KVO. EKG and bedside sono done

## 2022-03-22 NOTE — CHART NOTE - NSCHARTNOTEFT_GEN_A_CORE
Rapid Response Note    22F POD0 vacuum assisted vaginal delivery (EBL ~400cc), Surgical RRT call overhead for syncopal episode on ambulation to bathroom on postpartum floor, found to be hypotensive with SBP in 80s  - On evaluation patient appeared pale with SBP 88 reporting lightheadedness/dizziness and left chest pain  - 18g IV x 2 secured in each arm, LR bolus started, patient placed in Trendelenburg position and had symptomatic relief  - Repeat pressure improved to SBP low 100s  - Labs (CBC/BMP/Troponins/Lactate) drawn and sent  - EKG with incomplete RBBB, no previous EKG present for comparison  - FAST negative    Plan:  - Patient with overall clinical improvement at this time  - q15 blood pressure reading until labs result  - Further recommendations/management pending lab results      ABI Nunez, PGY4  General Surgery   [id number] with any questions

## 2022-03-22 NOTE — OB PROVIDER LABOR PROGRESS NOTE - NS_OBIHIFHRDETAILS_OBGYN_ALL_OB_FT
baseline 135, mod tito, +accels, no decels
Baseline 150 Moderate variability. -Accels - Decels
130-140, mod tito, no accels, late decels
Baseline  135 Moderate variability. +Accels - Decels
Baseline 145 Moderate variability. +Accels +Decel lasting 5min

## 2022-03-22 NOTE — OB NEONATOLOGY/PEDIATRICIAN DELIVERY SUMMARY - NSPEDSNEONOTESA_OBGYN_ALL_OB_FT
Peds called to attend delivery for category II tracing and vacuum assisted delivery.  This is a 38.1 GA male born to a 23 year old  mom.  Maternal BT AB-, prenatal labs negative/nonreactive/immune, GBS positive from 3/14 (received amp x6) and covid negative.  Mom admitted from the ATU for decreased fetal movement, BPP 6/8 and NRFHT.  No significant maternal medhx.  This pregnancy complicated by GDMA2 on insulin.  AROM clear fluid at 02:07 ~ 11 hours PTD.  Baby born born via VAVD, emerged with nuchal x 1.  Delayed cord clamping x 1 minute.  W/D/S/S.  Apgars 9/9.  EOS 0.12.  Infant transferred to  nursery for routine care.  Mom would like to breastfeed, wants Hep B, and consents for circumcision.

## 2022-03-22 NOTE — OB RN DELIVERY SUMMARY - NS_SEPSISRSKCALC_OBGYN_ALL_OB_FT
EOS calculated successfully. EOS Risk Factor: 0.5/1000 live births ([facility] national incidence); GA=38w1d; Temp=98.78; ROM=10.383; GBS='Positive'; Antibiotics='No antibiotics or any antibiotics < 2 hrs prior to birth'

## 2022-03-22 NOTE — CHART NOTE - NSCHARTNOTEFT_GEN_A_CORE
Patient s/p  vacuum assisted with 2nd degree periurethral laceration.  QBL: 442  Responded to an emergency bell in which SRRT was called.  Patient was found sitting in a wheel chair slumped over and pale.  Per primary nurse patient was using the bathroom and had presyncopal episode but did not fall due to RN supporting patient.  Patient was aroused in which she was oriented to person & place.  Patient assisted to bed and at that time SRRT team responded.      Plan:  EKG   Second IV started  2 L LR bolus  Labs drawn: CMP, CBC, Coags, Lactate, Cardiac labs  Monitor BPs q15 til labs result      OB team Dr. Arce, Dr. Sims & residents Stormy & Aurelio present during SRRT.  OB team to continue to follow-up with results.    Qing BENSON Patient s/p  vacuum assisted with 2nd degree periurethral laceration.  QBL: 442  Responded to an emergency bell in which SRRT was called.  Patient was found sitting in a wheel chair slumped over and pale.  Per primary nurse patient was using the bathroom and had syncopal episode but did not fall due to RN supporting patient.  Patient was aroused in which she was oriented to person & place.  Patient assisted to bed and at that time SRRT team responded.      Plan:  EKG   Second IV started  2 L LR bolus  Labs drawn: CMP, CBC, Coags, Lactate, Cardiac labs  Monitor BPs q15 til labs result      OB team Dr. Arce, Dr. Sims & residents Stormy & Aurelio present during SRRT.  OB team to continue to follow-up with results.    Qing BENSON

## 2022-03-22 NOTE — OB PROVIDER LABOR PROGRESS NOTE - NS_SUBJECTIVE/OBJECTIVE_OBGYN_ALL_OB_FT
Pt seen for evaluation of a cervical balloon placement.
At bedside for multiple prolonged decels/lates with return to baseline and mod variability between decels
Pt seen for placement of cervical balloon
Pt seen for deceleration
patient seen for evaluation if cervical balloon in place

## 2022-03-22 NOTE — OB PROVIDER LABOR PROGRESS NOTE - ASSESSMENT
Cervical balloon instilled with 60/60 cc of normal saline. Placed without complication. Pt tolerated the procedure well.   Continue BC   Epidural in place and providing relief       DW: Dr. Wesley Carey, PGY-1 
IIOL for decFM and NRNST. Patient with Cat 2 tracing that did improve with discontinuing pitocin, position change, and terbutaline administration. She is making quick cervical change.    -WYATT position  -allow passive descent  -? abruption as patient was 6cm initially at 730a  -monitor for VB, explained if persistent cat 2 tracing and unable to deliver vaginally, will need  section    D/w Dr. Eudin Toscano PGY-4
-cervical balloon sitting in vagina  -switch from buccal cytotec to pitocin  -category 1 tracing  -comfortable w/ epidural    d/w Dr. Chaim Lamar PGY3
Active repositioning.   Oxygen therapy applied   AROM of clear fluids   ISE placed   Return to baseline witnessed and questions were answered   Plan to resume Pitocin when tracing returns to cat 1 for >20 min     Seen examined with Dr. Burrows   DW: Dr. Candice Carey, PGY-1 
Continue BC   Marked pain and discomfort with vaginal exams. Pt desires epidural for her induction process.   Approved for a epidural.   Plan for CB when comfortable   DW: Dr. Wesley Carey, PGY-1

## 2022-03-22 NOTE — OB PROVIDER DELIVERY SUMMARY - NSPROVIDERDELIVERYNOTE_OBGYN_ALL_OB_FT
Due to prolonged cat II tracing, decision made to proceed with vacuum assisted delivery. Fetal vertex at +2 station, AMELIE position. Kiwi vacuum applied. Head delivered with 1 pulls, 0 pop-offs. Shoulders and body delivered easily. Infant was suctioned. No mec. 1 minute delayed cord clamping was performed. Cord clamped and cut and infant passed to peds, present for VAVD.  Placenta delivered intact with a 3 vessel cord. Marginal cord insertion was visualized. Fundal massage was given and uterine fundus was found to be firm. Vaginal exam revealed an intact cervix, and sulci. 2nd degree laceration was repaired with 2.0 chromic suture. Right nilda-urethral laceration was repaired with 3.0 vicryl suture. Excellent hemostasis was noted. Count was correct x2. QBL: 442 Due to prolonged cat II tracing, decision made to proceed with vacuum assisted delivery. Fetal vertex at +2 station, AMELIE position. Kiwi vacuum applied. Head delivered with 1 pulls, 0 pop-offs. Shoulders and body delivered easily. Infant was suctioned. No mec. 1 minute delayed cord clamping was performed. Cord clamped and cut and infant passed to peds, present for VAVD.  Placenta delivered intact with a 3 vessel cord. Marginal cord insertion was visualized. Fundal massage was given and uterine fundus was found to be firm. Vaginal exam revealed an intact cervix, and sulci. 2nd degree laceration was repaired with 2.0 chromic suture. Right nilda-urethral laceration was repaired with 3.0 vicryl suture. Excellent hemostasis was noted. Count was correct x2. QBL: 442    Associate Chief of L & D     Patient was also found to be FD and +2 station.  Patient was instructed to push due to CAT II.  Patient had an atraumatic VAVD of a live infant male, 2655 wgt, AMELIE , Apgars 9/9 over and intact perineum.  Delayed cord clamping was done and cord was obtained for cord gases.  The placenta delivered spontaneously and intact 3 vls noted and marginal cord insertion.  The cervix was without laceration and there was a 2nd degree laceration and right periurethral laceration. The baby and mother bonded well the QBL 442cc.    Laps and sharp count was correct    MIRIAM Zhang., M.B.A., M.S. Due to prolonged cat II tracing, decision made to proceed with vacuum assisted delivery. Fetal vertex at +2 station, AMELIE position. Kiwi vacuum applied. Head delivered with 1 pulls, 0 pop-offs. Shoulders and body delivered easily. Infant was suctioned. No mec. 1 minute delayed cord clamping was performed. Cord clamped and cut and infant passed to peds, present for VAVD.  Placenta delivered intact with a 3 vessel cord. Marginal cord insertion was visualized. Fundal massage was given and uterine fundus was found to be firm. Vaginal exam revealed an intact cervix, and sulci. 2nd degree laceration was repaired with 2.0 chromic suture. Right nilda-urethral laceration was repaired with 3.0 vicryl suture. Excellent hemostasis was noted. Count was correct x2. QBL: 442    Associate Chief of L & D     I have met this patient for the first time today when she was found to be FD and +2 station.  Patient was instructed to push due to CAT II.  Patient had an atraumatic VAVD of a live infant male, 2655 wgt, AMELIE , Apgars 9/9 over and intact perineum.  Delayed cord clamping was done and cord was obtained for cord gases.  The placenta delivered spontaneously and intact 3 vls noted and marginal cord insertion.  The cervix was without laceration and there was a 2nd degree laceration and right periurethral laceration. The baby and mother bonded well the QBL 442cc.    Laps and sharp count was correct    MIRIAM Zhang., M.B.A., M.S.

## 2022-03-23 ENCOUNTER — TRANSCRIPTION ENCOUNTER (OUTPATIENT)
Age: 22
End: 2022-03-23

## 2022-03-23 LAB
KLEIHAUER-BETKE CALCULATION: 0 % — SIGNIFICANT CHANGE UP
LACTATE SERPL-SCNC: 1.6 MMOL/L — SIGNIFICANT CHANGE UP (ref 0.5–2)
TROPONIN T, HIGH SENSITIVITY RESULT: <6 NG/L — SIGNIFICANT CHANGE UP

## 2022-03-23 PROCEDURE — 93010 ELECTROCARDIOGRAM REPORT: CPT

## 2022-03-23 RX ADMIN — Medication 1 TABLET(S): at 11:51

## 2022-03-23 RX ADMIN — Medication 975 MILLIGRAM(S): at 15:45

## 2022-03-23 RX ADMIN — SODIUM CHLORIDE 3 MILLILITER(S): 9 INJECTION INTRAMUSCULAR; INTRAVENOUS; SUBCUTANEOUS at 06:55

## 2022-03-23 RX ADMIN — SODIUM CHLORIDE 3 MILLILITER(S): 9 INJECTION INTRAMUSCULAR; INTRAVENOUS; SUBCUTANEOUS at 13:42

## 2022-03-23 RX ADMIN — Medication 600 MILLIGRAM(S): at 00:08

## 2022-03-23 RX ADMIN — Medication 600 MILLIGRAM(S): at 17:29

## 2022-03-23 RX ADMIN — SODIUM CHLORIDE 3 MILLILITER(S): 9 INJECTION INTRAMUSCULAR; INTRAVENOUS; SUBCUTANEOUS at 20:02

## 2022-03-23 RX ADMIN — Medication 600 MILLIGRAM(S): at 18:11

## 2022-03-23 RX ADMIN — Medication 600 MILLIGRAM(S): at 23:42

## 2022-03-23 RX ADMIN — Medication 975 MILLIGRAM(S): at 15:00

## 2022-03-23 RX ADMIN — Medication 975 MILLIGRAM(S): at 08:39

## 2022-03-23 RX ADMIN — Medication 600 MILLIGRAM(S): at 12:30

## 2022-03-23 RX ADMIN — Medication 975 MILLIGRAM(S): at 09:15

## 2022-03-23 RX ADMIN — Medication 600 MILLIGRAM(S): at 11:51

## 2022-03-23 RX ADMIN — Medication 975 MILLIGRAM(S): at 02:42

## 2022-03-23 RX ADMIN — Medication 975 MILLIGRAM(S): at 03:19

## 2022-03-23 NOTE — DISCHARGE NOTE OB - PATIENT PORTAL LINK FT
You can access the FollowMyHealth Patient Portal offered by [facility] by registering at the following website: [email or web address]. By joining [facility]’s FollowMyHealth portal, you will also be able to view your health information using other applications (apps) compatible with our system.

## 2022-03-23 NOTE — DISCHARGE NOTE OB - CARE PROVIDER_API CALL
KEILA FLAHERTY  Cardiology  Phone: ()-  Fax: ()-  Scheduled Appointment: 03/30/2022 01:30 PM    [facility],   [facility]   Oncology [facility], [place] [address]  Phone: [phone]  Fax: (   )    -  Established Patient  Follow Up Time: Routine

## 2022-03-23 NOTE — PROGRESS NOTE ADULT - SUBJECTIVE AND OBJECTIVE BOX
OB Progress Note:  PPD#1    S: 23yo PPD#1 s/p VAVD and postpartum RRT 2/2 syncopal episode + hypotension. Patient feels well today. Pain is well controlled. She is tolerating a regular diet and passing flatus. She is voiding spontaneously. States that she has not attempted to ambulate since syncopal episode yesterday. Denies CP/SOB. Denies lightheadedness/dizziness. Denies N/V.    O:  Vitals:  Vital Signs Last 24 Hrs  T(C): 37 (23 Mar 2022 06:35), Max: 37.7 (22 Mar 2022 16:00)  T(F): 98.6 (23 Mar 2022 06:35), Max: 99.8 (22 Mar 2022 16:00)  HR: 74 (23 Mar 2022 06:35) (67 - 123)  BP: 96/52 (23 Mar 2022 06:35) (85/53 - 115/65)  BP(mean): --  RR: 18 (23 Mar 2022 06:35) (17 - 18)  SpO2: 100% (23 Mar 2022 06:35) (88% - 100%)    MEDICATIONS  (STANDING):  acetaminophen     Tablet .. 975 milliGRAM(s) Oral <User Schedule>  diphtheria/tetanus/pertussis (acellular) Vaccine (ADAcel) 0.5 milliLiter(s) IntraMuscular once  ibuprofen  Tablet. 600 milliGRAM(s) Oral every 6 hours  ketorolac   Injectable 30 milliGRAM(s) IV Push once  oxytocin Infusion 333.333 milliUNIT(s)/Min (1000 mL/Hr) IV Continuous <Continuous>  oxytocin Infusion 333.333 milliUNIT(s)/Min (1000 mL/Hr) IV Continuous <Continuous>  prenatal multivitamin 1 Tablet(s) Oral daily  sodium chloride 0.9% lock flush 3 milliLiter(s) IV Push every 8 hours      Labs:  Blood type: AB Negative  Rubella IgG: RPR: Negative                          9.9<L>   18.59<H> >-----------< 330    (  @ 19:04 )             30.1<L>                        10.7<L>   11.76<H> >-----------< 376    (  @ 15:00 )             33.6<L>    22 @ 19:04      138  |  106  |  6<L>  ----------------------------<  88  3.5   |  20<L>  |  0.63        Ca    8.6      22 Mar 2022 19:04  Phos  3.4       Mg     1.60                 Physical Exam:  General: NAD  Abdomen: soft, non-tender, non-distended, fundus firm  Vaginal: Lochia wnl  Extremities: No erythema/edema     OB Progress Note:  PPD#1    S: 21yo PPD#1 s/p VAVD and postpartum RRT 2/2 syncopal episode + hypotension. Patient feels well today. Pain is well controlled. She is tolerating a regular diet and passing flatus. She is voiding spontaneously. States that she has not attempted to ambulate this morning but able to ambulate successfully overnight. Denies CP/SOB. Denies lightheadedness/dizziness. Denies N/V.    O:  Vitals:  Vital Signs Last 24 Hrs  T(C): 37 (23 Mar 2022 06:35), Max: 37.7 (22 Mar 2022 16:00)  T(F): 98.6 (23 Mar 2022 06:35), Max: 99.8 (22 Mar 2022 16:00)  HR: 74 (23 Mar 2022 06:35) (67 - 123)  BP: 96/52 (23 Mar 2022 06:35) (85/53 - 115/65)  BP(mean): --  RR: 18 (23 Mar 2022 06:35) (17 - 18)  SpO2: 100% (23 Mar 2022 06:35) (88% - 100%)    MEDICATIONS  (STANDING):  acetaminophen     Tablet .. 975 milliGRAM(s) Oral <User Schedule>  diphtheria/tetanus/pertussis (acellular) Vaccine (ADAcel) 0.5 milliLiter(s) IntraMuscular once  ibuprofen  Tablet. 600 milliGRAM(s) Oral every 6 hours  ketorolac   Injectable 30 milliGRAM(s) IV Push once  oxytocin Infusion 333.333 milliUNIT(s)/Min (1000 mL/Hr) IV Continuous <Continuous>  oxytocin Infusion 333.333 milliUNIT(s)/Min (1000 mL/Hr) IV Continuous <Continuous>  prenatal multivitamin 1 Tablet(s) Oral daily  sodium chloride 0.9% lock flush 3 milliLiter(s) IV Push every 8 hours      Labs:  Blood type: AB Negative  Rubella IgG: RPR: Negative                          9.9<L>   18.59<H> >-----------< 330    (  @ 19:04 )             30.1<L>                        10.7<L>   11.76<H> >-----------< 376    (  @ 15:00 )             33.6<L>    22 @ 19:04      138  |  106  |  6<L>  ----------------------------<  88  3.5   |  20<L>  |  0.63        Ca    8.6      22 Mar 2022 19:04  Phos  3.4       Mg     1.60                 Physical Exam:  General: NAD  Abdomen: soft, non-tender, non-distended, fundus firm  Vaginal: Lochia wnl  Extremities: No erythema. Trace edema

## 2022-03-23 NOTE — DISCHARGE NOTE OB - COMMUNITY RESOURCE NAME:
Patient instructed to make a follow up appointment at [facility] at [facility], [phone] for 4-6 weeks from delivery date. Patient also instructed to make a follow up appointment for the baby at [facility], Division of General Pediatrics, [phone] for 1-2 days from discharge date.

## 2022-03-23 NOTE — DISCHARGE NOTE OB - MEDICATION SUMMARY - MEDICATIONS TO STOP TAKING
I will STOP taking the medications listed below when I get home from the hospital:    Basaglar KwikPen  -- 12 unit(s) subcutaneous once a day (at bedtime)

## 2022-03-23 NOTE — LACTATION INITIAL EVALUATION - LACTATION INTERVENTIONS
initiate/review safe skin-to-skin/initiate/review hand expression/initiate/review techniques for position and latch/initiate/review finger suck/initiate/review breast massage/compression/reviewed components of an effective feeding and at least 8 effective feedings per day required/reviewed importance of monitoring infant diapers, the breastfeeding log, and minimum output each day/reviewed risks of unnecessary formula supplementation/reviewed strategies to transition to breastfeeding only/reviewed benefits and recommendations for rooming in/reviewed feeding on demand/by cue at least 8 times a day/recommended follow-up with pediatrician within 24 hours of discharge/reviewed indications of inadequate milk transfer that would require supplementation

## 2022-03-23 NOTE — PROVIDER CONTACT NOTE (OTHER) - ACTION/TREATMENT ORDERED:
Notified resident. Lactate levels and another set of orthostatic BP to be performed at 5am. Will continue to monitor

## 2022-03-23 NOTE — PROGRESS NOTE ADULT - ASSESSMENT
POD #1 S/P vaginal delivery. Patient is doing well. Out of bed and ambulating, urinating, tolerating diet. Denies headache, nausea and vomiting. Pain is tolerated. No residual anesthetic issues or complications noted.     Kelsi Painting CRNA

## 2022-03-23 NOTE — DISCHARGE NOTE OB - ADDITIONAL INSTRUCTIONS
Make your follow-up appointment with your doctor as ordered in 6 weeks for a routine postpartum visit. No heavy lifting, driving, or strenuous activity for 6 weeks. Nothing per vagina such as tampons, intercourse, douches, or tub baths for 6 weeks or until you see your doctor. Call your doctor with any signs and symptoms of infection such as fever, chills, nausea, or vomiting. Call your doctor if you're unable to tolerate food, increase in vaginal bleeding, or have difficulty urinating. Call your doctor if you have pain that is not relieved by your prescribed medications. Notify your doctor with any other concerns.   Call [phone] if you have any of these concerns in the next 6 weeks.    Please be sure to follow-up with outpatient cardiology for abnormal EKG findings - incomplete RBBB. Apt info below:  Wed, Mar 30 @1:30pm   Dr. Sofia Chang  [address] Advisor, Entrance 3

## 2022-03-23 NOTE — PROVIDER CONTACT NOTE (OTHER) - BACKGROUND
NSD @ 1223 vaccum assisted. Para 1001. . 2nd degree laceration. GDM@2 was on insulin during pregnancy. RRT on 3/22 @1800 for a sycope episode.

## 2022-03-23 NOTE — CHART NOTE - NSCHARTNOTEFT_GEN_A_CORE
0108    House officer discussing patient's lactate of 2.2 from 2300 (3/22) w/ RN. RN reports that patient has been asx since the rapid response and      incomplete 0108    House officer discussing patient's lactate of 2.2 from 2300 (3/22) w/ RN. RN reports that patient has been asx since the rapid response and has been up and out of bed w/o complaints. VS reviewed and are reassuring. Pt neither tachycardic, febrile, nor hypotensive.   - Will repeat orthostatics and lactate in the AM    Scott Jimenez  PGY-1, Obstetrics & Gynecology     d/w Dr. Olson

## 2022-03-23 NOTE — DISCHARGE NOTE OB - PLAN OF CARE
After discharge, please stay on pelvic rest for 6 weeks, meaning no sexual intercourse, no tampons and no douching.  No driving for 2 weeks as women can loose a lot of blood during delivery and there is a possibility of being lightheaded/fainting.  No lifting heavy objects for two weeks.  Expect to have vaginal bleeding/spotting for up to six weeks.  The bleeding should get lighter and more white/light brown with time.  For bleeding soaking more than a pad an hour or passing clots greater than the size of your fist, come in to the emergency department.    Follow up in clinic in 6 weeks. Patient had a episode of hypotension and syncope on postpartum day 0, for which a rapid response team was called. EKG was performed at that time and patient was found to have an incomplete partial RBBB. On review of patient's chart, it was found that patient had an EKG from 3/22 which also showed partial RBBB. Patient's EKG was repeated on postpartum day 1 which showed NSR w/o RBBB. Patient remained asymptomatic throughout her admission with no complaints of chest pain, no episodes of tachycardia, and no additional episodes of hypotension.     Outpatient cardiology appointment was scheduled for f/u:   Wed, Mar 30 @1:30pm   Dr. Sofia Chang  [address] [place] [address], Entrance 3

## 2022-03-23 NOTE — DISCHARGE NOTE OB - MEDICATION SUMMARY - MEDICATIONS TO TAKE
I will START or STAY ON the medications listed below when I get home from the hospital:    acetaminophen 325 mg oral tablet  -- 3 tab(s) by mouth every 6 hours  -- Indication: For postpartum pain    ibuprofen 600 mg oral tablet  -- 1 tab(s) by mouth every 6 hours  -- Indication: For postpartum pain    PNV Prenatal oral tablet  -- 1 tab(s) by mouth once a day  -- Indication: For healthcare maintenance

## 2022-03-23 NOTE — PROGRESS NOTE ADULT - ASSESSMENT
A/P: 23yo PPD#1 s/p VAVD, EBL = 400, and RRT 2/2 syncopal episode and hypotension.  Patient is stable and doing well post-partum.   - Pain well controlled, continue current pain regimen  - Increase ambulation, SCDs when not ambulating  - Continue regular diet    Maryam Crowe MD PGY1 A/P: 23yo PPD#1 s/p VAVD, EBL = 400, and RRT 2/2 syncopal episode and hypotension. Patient feels well today. VSS    #syncopal episode/hypotension, s/p RRT  - syncopal episode on ambulation with systolic BPs in 80s   - LR bolus x2 given   - EKG with incomplete RBBB; plan to repeat today   - STAT CBC, BMP, Lactate, Cardiac Trops sent. Trops neg, Lactate 2.0 then repeat again 2.2. H/H 9.9/30.1  - FAST negative   - will repeat lactate today and cardiac trops     #GDMA2   - monitor 24 hour fingersticks. have been <180   - d/c fingerstick monitoring at 1223pm today     #postpartum day 1  - Pain well controlled, continue current pain regimen  - Ambulation with assistance, SCDs when not ambulating. Can ambulate independently if successfully able to ambulate with assistance   - Continue regular diet  - counseled on contraception and short interval pregnancy. Declining   - male infant, requesting circumcision. Will consent and complete today     Maryam Crowe MD PGY1

## 2022-03-23 NOTE — DISCHARGE NOTE OB - PROVIDER TOKENS
PROVIDER:[TOKEN:[[id number]],SCHEDULEDAPPT:[03/30/2022],SCHEDULEDAPPTTIME:[01:30 PM]],FREE:[LAST:[[facility]],PHONE:[phone]:[(   [phone]    -],ADDRESS:[[facility], [place] [address]],FOLLOWUP:[Routine],ESTABLISHEDPATIENT:[T]]

## 2022-03-23 NOTE — DISCHARGE NOTE OB - NS MD DC FALL RISK RISK
Detail Level: Zone
For information on Fall & Injury Prevention, visit: [email or web address] OR  [email or web address] OR  https://www.cdc.gov/steadi/patient.html

## 2022-03-23 NOTE — DISCHARGE NOTE OB - CARE PLAN
1 Principal Discharge DX:	Status post vacuum-assisted vaginal delivery   Principal Discharge DX:	Status post vacuum-assisted vaginal delivery  Assessment and plan of treatment:	After discharge, please stay on pelvic rest for 6 weeks, meaning no sexual intercourse, no tampons and no douching.  No driving for 2 weeks as women can loose a lot of blood during delivery and there is a possibility of being lightheaded/fainting.  No lifting heavy objects for two weeks.  Expect to have vaginal bleeding/spotting for up to six weeks.  The bleeding should get lighter and more white/light brown with time.  For bleeding soaking more than a pad an hour or passing clots greater than the size of your fist, come in to the emergency department.    Follow up in clinic in 6 weeks.  Secondary Diagnosis:	Abnormal EKG  Assessment and plan of treatment:	Patient had a episode of hypotension and syncope on postpartum day 0, for which a rapid response team was called. EKG was performed at that time and patient was found to have an incomplete partial RBBB. On review of patient's chart, it was found that patient had an EKG from 3/22 which also showed partial RBBB. Patient's EKG was repeated on postpartum day 1 which showed NSR w/o RBBB. Patient remained asymptomatic throughout her admission with no complaints of chest pain, no episodes of tachycardia, and no additional episodes of hypotension.     Outpatient cardiology appointment was scheduled for f/u:   Wed, Mar 30 @1:30pm   Dr. Sofia Chang  [address], Entrance 3

## 2022-03-24 RX ADMIN — Medication 600 MILLIGRAM(S): at 17:29

## 2022-03-24 RX ADMIN — MAGNESIUM HYDROXIDE 30 MILLILITER(S): 400 TABLET, CHEWABLE ORAL at 21:08

## 2022-03-24 RX ADMIN — Medication 600 MILLIGRAM(S): at 14:00

## 2022-03-24 RX ADMIN — Medication 600 MILLIGRAM(S): at 18:00

## 2022-03-24 RX ADMIN — Medication 600 MILLIGRAM(S): at 07:00

## 2022-03-24 RX ADMIN — SIMETHICONE 80 MILLIGRAM(S): 80 TABLET, CHEWABLE ORAL at 17:35

## 2022-03-24 RX ADMIN — SODIUM CHLORIDE 3 MILLILITER(S): 9 INJECTION INTRAMUSCULAR; INTRAVENOUS; SUBCUTANEOUS at 13:45

## 2022-03-24 RX ADMIN — SODIUM CHLORIDE 3 MILLILITER(S): 9 INJECTION INTRAMUSCULAR; INTRAVENOUS; SUBCUTANEOUS at 23:16

## 2022-03-24 RX ADMIN — SODIUM CHLORIDE 3 MILLILITER(S): 9 INJECTION INTRAMUSCULAR; INTRAVENOUS; SUBCUTANEOUS at 05:25

## 2022-03-24 RX ADMIN — Medication 975 MILLIGRAM(S): at 21:50

## 2022-03-24 RX ADMIN — Medication 600 MILLIGRAM(S): at 00:23

## 2022-03-24 RX ADMIN — Medication 600 MILLIGRAM(S): at 05:29

## 2022-03-24 RX ADMIN — MAGNESIUM HYDROXIDE 30 MILLILITER(S): 400 TABLET, CHEWABLE ORAL at 17:33

## 2022-03-24 RX ADMIN — Medication 600 MILLIGRAM(S): at 13:24

## 2022-03-24 RX ADMIN — Medication 975 MILLIGRAM(S): at 21:04

## 2022-03-24 NOTE — PROGRESS NOTE ADULT - SUBJECTIVE AND OBJECTIVE BOX
OB Progress Note: VAVD PPD#2    S: 21yo PPD#2 s/p VAVD and RRT on postpartum day 0 2/2 syncopal episode + hypotension. Patient feels well. Pain is well controlled. She is tolerating a regular diet and passing flatus. She is voiding spontaneously, and ambulating without difficulty. Denies CP/SOB. Denies lightheadedness/dizziness. Denies N/V.    O:  Vitals:   Vital Signs Last 24 Hrs  T(C): 36.8 (24 Mar 2022 05:24), Max: 36.8 (24 Mar 2022 05:24)  T(F): 98.2 (24 Mar 2022 05:24), Max: 98.2 (24 Mar 2022 05:24)  HR: 73 (24 Mar 2022 05:24) (69 - 73)  BP: 108/74 (24 Mar 2022 05:24) (93/52 - 108/74)  BP(mean): --  RR: 18 (24 Mar 2022 05:24) (16 - 18)  SpO2: 97% (24 Mar 2022 05:24) (97% - 100%)    MEDICATIONS  (STANDING):  acetaminophen     Tablet .. 975 milliGRAM(s) Oral <User Schedule>  diphtheria/tetanus/pertussis (acellular) Vaccine (ADAcel) 0.5 milliLiter(s) IntraMuscular once  ibuprofen  Tablet. 600 milliGRAM(s) Oral every 6 hours  ketorolac   Injectable 30 milliGRAM(s) IV Push once  oxytocin Infusion 333.333 milliUNIT(s)/Min (1000 mL/Hr) IV Continuous <Continuous>  oxytocin Infusion 333.333 milliUNIT(s)/Min (1000 mL/Hr) IV Continuous <Continuous>  prenatal multivitamin 1 Tablet(s) Oral daily  sodium chloride 0.9% lock flush 3 milliLiter(s) IV Push every 8 hours    MEDICATIONS  (PRN):  benzocaine 20%/menthol 0.5% Spray 1 Spray(s) Topical every 6 hours PRN for Perineal discomfort  dibucaine 1% Ointment 1 Application(s) Topical every 6 hours PRN Perineal discomfort  diphenhydrAMINE 25 milliGRAM(s) Oral every 6 hours PRN Pruritus  hydrocortisone 1% Cream 1 Application(s) Topical every 6 hours PRN Moderate Pain (4-6)  lanolin Ointment 1 Application(s) Topical every 6 hours PRN nipple soreness  magnesium hydroxide Suspension 30 milliLiter(s) Oral two times a day PRN Constipation  oxyCODONE    IR 5 milliGRAM(s) Oral every 3 hours PRN Moderate to Severe Pain (4-10)  oxyCODONE    IR 5 milliGRAM(s) Oral once PRN Moderate to Severe Pain (4-10)  pramoxine 1%/zinc 5% Cream 1 Application(s) Topical every 4 hours PRN Moderate Pain (4-6)  simethicone 80 milliGRAM(s) Chew every 4 hours PRN Gas  witch hazel Pads 1 Application(s) Topical every 4 hours PRN Perineal discomfort      Labs:  Blood type: AB Negative  Rubella IgG: RPR: Negative                          9.9<L>   18.59<H> >-----------< 330    (  @ 19:04 )             30.1<L>                        10.7<L>   11.76<H> >-----------< 376    (  @ 15:00 )             33.6<L>    - @ 19:04      138  |  106  |  6<L>  ----------------------------<  88  3.5   |  20<L>  |  0.63        Ca    8.6      22 Mar 2022 19:04  Phos  3.4       Mg     1.60                 Physical Exam:  General: NAD  Abdomen: soft, non-tender, non-distended, fundus firm  Vaginal: Lochia wnl  Extremities: No erythema/edema    A/P: 21yo PPD#2 s/p .  Patient is stable and doing well post-partum.    - Pain well controlled, continue current pain regimen  - Increase ambulation, SCDs when not ambulating  - Continue regular diet  - Discharge planning     Maryam Crowe MD PGY1 OB Progress Note: VAVD PPD#2    S: 23yo PPD#2 s/p VAVD and RRT on postpartum day 0 2/2 syncopal episode + hypotension. Patient feels well. Reports some labial pain and itching today. She is tolerating a regular diet and passing flatus. She is voiding spontaneously, and ambulating without difficulty. Denies CP/SOB. Denies lightheadedness/dizziness. Denies N/V.    O:  Vitals:   Vital Signs Last 24 Hrs  T(C): 36.8 (24 Mar 2022 05:24), Max: 36.8 (24 Mar 2022 05:24)  T(F): 98.2 (24 Mar 2022 05:24), Max: 98.2 (24 Mar 2022 05:24)  HR: 73 (24 Mar 2022 05:24) (69 - 73)  BP: 108/74 (24 Mar 2022 05:24) (93/52 - 108/74)  BP(mean): --  RR: 18 (24 Mar 2022 05:24) (16 - 18)  SpO2: 97% (24 Mar 2022 05:24) (97% - 100%)    MEDICATIONS  (STANDING):  acetaminophen     Tablet .. 975 milliGRAM(s) Oral <User Schedule>  diphtheria/tetanus/pertussis (acellular) Vaccine (ADAcel) 0.5 milliLiter(s) IntraMuscular once  ibuprofen  Tablet. 600 milliGRAM(s) Oral every 6 hours  ketorolac   Injectable 30 milliGRAM(s) IV Push once  oxytocin Infusion 333.333 milliUNIT(s)/Min (1000 mL/Hr) IV Continuous <Continuous>  oxytocin Infusion 333.333 milliUNIT(s)/Min (1000 mL/Hr) IV Continuous <Continuous>  prenatal multivitamin 1 Tablet(s) Oral daily  sodium chloride 0.9% lock flush 3 milliLiter(s) IV Push every 8 hours    MEDICATIONS  (PRN):  benzocaine 20%/menthol 0.5% Spray 1 Spray(s) Topical every 6 hours PRN for Perineal discomfort  dibucaine 1% Ointment 1 Application(s) Topical every 6 hours PRN Perineal discomfort  diphenhydrAMINE 25 milliGRAM(s) Oral every 6 hours PRN Pruritus  hydrocortisone 1% Cream 1 Application(s) Topical every 6 hours PRN Moderate Pain (4-6)  lanolin Ointment 1 Application(s) Topical every 6 hours PRN nipple soreness  magnesium hydroxide Suspension 30 milliLiter(s) Oral two times a day PRN Constipation  oxyCODONE    IR 5 milliGRAM(s) Oral every 3 hours PRN Moderate to Severe Pain (4-10)  oxyCODONE    IR 5 milliGRAM(s) Oral once PRN Moderate to Severe Pain (4-10)  pramoxine 1%/zinc 5% Cream 1 Application(s) Topical every 4 hours PRN Moderate Pain (4-6)  simethicone 80 milliGRAM(s) Chew every 4 hours PRN Gas  witch hazel Pads 1 Application(s) Topical every 4 hours PRN Perineal discomfort      Labs:  Blood type: AB Negative  Rubella IgG: RPR: Negative                          9.9<L>   18.59<H> >-----------< 330    ( 03-22 @ 19:04 )             30.1<L>                        10.7<L>   11.76<H> >-----------< 376    ( 03-21 @ 15:00 )             33.6<L>    03-22-22 @ 19:04      138  |  106  |  6<L>  ----------------------------<  88  3.5   |  20<L>  |  0.63        Ca    8.6      22 Mar 2022 19:04  Phos  3.4     03-22  Mg     1.60     03-22            Physical Exam:  General: NAD  Abdomen: soft, non-tender, non-distended, fundus firm  Vaginal: Lochia wnl. Labia non-edematous, non-erythematous   Extremities: No erythema. 1+ edema

## 2022-03-24 NOTE — PROGRESS NOTE ADULT - ASSESSMENT
A/P: 21yo PPD#2 s/p VAVD, EBL = 400 and post-partum day 0 RRT 2/2 syncopal event/hypotension.  Patient is stable and doing well post-partum.    - Pain well controlled, continue current pain regimen  - Increase ambulation, SCDs when not ambulating  - Continue regular diet  - Discharge planning     Maryam Crowe MD PGY1 A/P: 23yo PPD#2 s/p VAVD, EBL = 400 and post-partum day 0 RRT 2/2 syncopal event/hypotension.  Patient is stable and doing well post-partum.      #syncopal episode/hypotension, s/p RRT  - syncopal episode on ambulation with systolic BPs in 80s on PPD 0   - LR bolus x2 given. EKG with incomplete RBBB, repeated on PPD 1, NSR   - Lactate cleared - 1.6   - cardiac trops neg x2   - H/H stable     #incomplete RBBB  - seen on EKG on PPD 0 and EKG on file from ED admission on 3/12   - will bedside cardio for recs, consider consult   - pt denies cardiac hx. States she has been to ED twice in pregnancy for chest pain, dx 2/2 GERD    #PPD 2   -Pain well controlled, continue current pain regimen  - Increase ambulation, SCDs when not ambulating  - Continue regular diet  - Discharge planning   - declines contraception   -male circumcision completed on 3/23    Maryam Crowe MD PGY1

## 2022-03-25 ENCOUNTER — APPOINTMENT (OUTPATIENT)
Dept: ANTEPARTUM | Facility: CLINIC | Age: 22
End: 2022-03-25

## 2022-03-25 VITALS
HEART RATE: 68 BPM | OXYGEN SATURATION: 99 % | TEMPERATURE: 98 F | SYSTOLIC BLOOD PRESSURE: 110 MMHG | RESPIRATION RATE: 18 BRPM | DIASTOLIC BLOOD PRESSURE: 69 MMHG

## 2022-03-25 RX ORDER — ACETAMINOPHEN 500 MG
3 TABLET ORAL
Qty: 0 | Refills: 0 | DISCHARGE
Start: 2022-03-25

## 2022-03-25 RX ORDER — CHOLECALCIFEROL (VITAMIN D3) 125 MCG
1 CAPSULE ORAL
Qty: 0 | Refills: 0 | DISCHARGE

## 2022-03-25 RX ORDER — FERROUS SULFATE 325(65) MG
0 TABLET ORAL
Qty: 0 | Refills: 0 | DISCHARGE

## 2022-03-25 RX ORDER — IBUPROFEN 200 MG
1 TABLET ORAL
Qty: 0 | Refills: 0 | DISCHARGE
Start: 2022-03-25

## 2022-03-25 RX ORDER — INSULIN GLARGINE 100 [IU]/ML
12 INJECTION, SOLUTION SUBCUTANEOUS
Qty: 0 | Refills: 0 | DISCHARGE

## 2022-03-25 RX ADMIN — SODIUM CHLORIDE 3 MILLILITER(S): 9 INJECTION INTRAMUSCULAR; INTRAVENOUS; SUBCUTANEOUS at 05:56

## 2022-03-25 RX ADMIN — Medication 600 MILLIGRAM(S): at 01:00

## 2022-03-25 RX ADMIN — Medication 600 MILLIGRAM(S): at 05:37

## 2022-03-25 RX ADMIN — Medication 600 MILLIGRAM(S): at 00:08

## 2022-03-25 RX ADMIN — Medication 600 MILLIGRAM(S): at 06:30

## 2022-03-25 NOTE — PROGRESS NOTE ADULT - ASSESSMENT
A/P: 21yo PPD#3 s/p VAVD, EBL = 400 and post-partum day 0 RRT 2/2 syncopal event/hypotension. Patient is stable and doing well post-partum.      #syncopal episode/hypotension, s/p RRT  - syncopal episode on ambulation with systolic BPs in 80s on PPD 0   - LR bolus x2 given. EKG at time of rapid showing incomplete RBBB, repeated on PPD 1, NSR   - Lactate cleared - 1.6 on PPD 1  - cardiac trops neg x2   - H/H stable   - pt stable and asymptomatic since     #incomplete RBBB  - seen on EKG on PPD 0 and EKG on file from ED admission on 3/12. EKG on PPD1 NSR without evidence of RBBB  - pt denies cardiac hx. States she has been to ED twice in pregnancy for chest pain, dx 2/2 GERD  - outpatient cardiology appointment scheduled for 3/30. Pt informed re: need to follow-up    #PPD 3  - Pain well controlled, continue current pain regimen  - Increase ambulation, SCDs when not ambulating  - Continue regular diet  - Discharge today with f/u in clinic in 6 weeks for postpartum visit    Maryam Crowe MD PGY1

## 2022-03-25 NOTE — PROGRESS NOTE ADULT - ATTENDING COMMENTS
Associate Chief of L & D (Late entry)      OB Progress Note: VAVD PPD#3    S: 21yo  PPD#3 s/p VAVD. Patient did not report any complaints at this time she reported that she had several visits to the ED for chest pain    O:  Vital Signs Last 24 Hrs  T(C): 36.4 (25 Mar 2022 05:56), Max: 36.7 (24 Mar 2022 17:44)  T(F): 97.5 (25 Mar 2022 05:56), Max: 98.1 (24 Mar 2022 17:44)  HR: 71 (25 Mar 2022 05:56) (66 - 71)  BP: 107/72 (25 Mar 2022 05:56) (103/67 - 107/72)  RR: 18 (25 Mar 2022 05:56) (18 - 18)  SpO2: 100% (25 Mar 2022 05:56) (99% - 100%)    MEDICATIONS  (STANDING):  acetaminophen     Tablet .. 975 milliGRAM(s) Oral <User Schedule>  diphtheria/tetanus/pertussis (acellular) Vaccine (ADAcel) 0.5 milliLiter(s) IntraMuscular once  ibuprofen  Tablet. 600 milliGRAM(s) Oral every 6 hours  ketorolac   Injectable 30 milliGRAM(s) IV Push once  oxytocin Infusion 333.333 milliUNIT(s)/Min (1000 mL/Hr) IV Continuous <Continuous>  oxytocin Infusion 333.333 milliUNIT(s)/Min (1000 mL/Hr) IV Continuous <Continuous>  prenatal multivitamin 1 Tablet(s) Oral daily  sodium chloride 0.9% lock flush 3 milliLiter(s) IV Push every 8 hours        Labs:  Blood type: AB Negative  Rubella IgG: RPR: Negative                          9.9<L>   18.59<H> >-----------< 330    ( 03-22 @ 19:04 )             30.1<L>                        10.7<L>   11.76<H> >-----------< 376    ( 03-21 @ 15:00 )             33.6<L>    03-22-22 @ 19:04      138  |  106  |  6<L>  ----------------------------<  88  3.5   |  20<L>  |  0.63        Physical Exam:  Abdomen: soft, non-tender, non-distended, fundus firm  Vaginal: Lochia wnl, sutures in situ  Extremities: No erythema/ trace edema    A/P: 21yo PPD# 3  s/p VAVD and repair of 2nd degree and right periurethral laceration complicated by surgical rapid response - hypotension and syncope symptoms resolved    - Patient is stable for discharge and will follow up in the PP clinic  - patient to see cardiology due to incomplete RBBB- patient has an appointment for 3/30 1:30 pm with Dr Chang cardiologist    Delia Amaya M.D., M.B.A., M.S.
Associate Chief of L & D (Late entry)    I met this patient yesterday at her delivery and upon review of last night,  Mookie had a surgical rapid response due to a syncopal episode with hypotension    OB Progress Note: VAVD PPD#1    S: 23yo  PPD#1 s/p VAVD. Patient did not report any complaints at this time.    O:  Vitals:  Vital Signs Last 24 Hrs  T(C): 36.4 (23 Mar 2022 13:49), Max: 37.7 (22 Mar 2022 16:00)  T(F): 97.6 (23 Mar 2022 13:49), Max: 99.8 (22 Mar 2022 16:00)  HR: 72 (23 Mar 2022 13:49) (67 - 110)  BP: 93/52 (23 Mar 2022 13:49) (88/41 - 114/66)  RR: 16 (23 Mar 2022 13:49) (16 - 18)  SpO2: 99% (23 Mar 2022 13:49) (98% - 100%)    MEDICATIONS  (STANDING):  acetaminophen     Tablet .. 975 milliGRAM(s) Oral <User Schedule>  diphtheria/tetanus/pertussis (acellular) Vaccine (ADAcel) 0.5 milliLiter(s) IntraMuscular once  ibuprofen  Tablet. 600 milliGRAM(s) Oral every 6 hours  ketorolac   Injectable 30 milliGRAM(s) IV Push once  oxytocin Infusion 333.333 milliUNIT(s)/Min (1000 mL/Hr) IV Continuous <Continuous>  oxytocin Infusion 333.333 milliUNIT(s)/Min (1000 mL/Hr) IV Continuous <Continuous>  prenatal multivitamin 1 Tablet(s) Oral daily  sodium chloride 0.9% lock flush 3 milliLiter(s) IV Push every 8 hours      Labs:  Blood type: AB Negative  Rubella IgG: RPR: Negative                          9.9<L>   18.59<H> >-----------< 330    ( 03-22 @ 19:04 )             30.1<L>                        10.7<L>   11.76<H> >-----------< 376    ( 03-21 @ 15:00 )             33.6<L>    03-22-22 @ 19:04      138  |  106  |  6<L>  ----------------------------<  88  3.5   |  20<L>  |  0.63        Ca    8.6      22 Mar 2022 19:04  Phos  3.4     03-22  Mg     1.60     03-22            Physical Exam:  Abdomen: soft, non-tender, non-distended, fundus firm  Vaginal: Lochia wnl, sutures in situ  Extremities: No erythema/edema    A/P: 23yo PPD#1 s/p VAVD and repair of 2nd degree and right periurethral laceration complicated by surgical rapid response - hypotension and syncope    - Pain well controlled, continue current pain regimen  - Increase ambulation, SCDs when not ambulating  - Continue regular diet    Delia Yony Frias M.D., M.B.A., M.S.
Associate Chief of L & D (Late entry)      OB Progress Note: VAVD PPD#2    S: 21yo  PPD#2 s/p VAVD. Patient did not report any complaints at this time she reported that she had several visits to the ED for chest pain    O:  Vital Signs Last 24 Hrs  T(C): 36.8 (24 Mar 2022 05:24), Max: 36.8 (24 Mar 2022 05:24)  T(F): 98.2 (24 Mar 2022 05:24), Max: 98.2 (24 Mar 2022 05:24)  HR: 73 (24 Mar 2022 05:24) (69 - 73)  BP: 108/74 (24 Mar 2022 05:24) (93/52 - 108/74)  RR: 18 (24 Mar 2022 05:24) (16 - 18)  SpO2: 97% (24 Mar 2022 05:24) (97% - 100%)    MEDICATIONS  (STANDING):  acetaminophen     Tablet .. 975 milliGRAM(s) Oral <User Schedule>  diphtheria/tetanus/pertussis (acellular) Vaccine (ADAcel) 0.5 milliLiter(s) IntraMuscular once  ibuprofen  Tablet. 600 milliGRAM(s) Oral every 6 hours  ketorolac   Injectable 30 milliGRAM(s) IV Push once  oxytocin Infusion 333.333 milliUNIT(s)/Min (1000 mL/Hr) IV Continuous <Continuous>  oxytocin Infusion 333.333 milliUNIT(s)/Min (1000 mL/Hr) IV Continuous <Continuous>  prenatal multivitamin 1 Tablet(s) Oral daily  sodium chloride 0.9% lock flush 3 milliLiter(s) IV Push every 8 hours        Labs:  Blood type: AB Negative  Rubella IgG: RPR: Negative                          9.9<L>   18.59<H> >-----------< 330    ( 03-22 @ 19:04 )             30.1<L>                        10.7<L>   11.76<H> >-----------< 376    ( 03-21 @ 15:00 )             33.6<L>    03-22-22 @ 19:04      138  |  106  |  6<L>  ----------------------------<  88  3.5   |  20<L>  |  0.63        Physical Exam:  Abdomen: soft, non-tender, non-distended, fundus firm  Vaginal: Lochia wnl, sutures in situ  Extremities: No erythema/ trace edema    A/P: 21yo PPD# 2 s/p VAVD and repair of 2nd degree and right periurethral laceration complicated by surgical rapid response - hypotension and syncope symptoms resolved    - Pain well controlled, continue current pain regimen  - Increase ambulation, SCDs when not ambulating  - Continue regular diet  - patient to see cardiology due to incomplete RBBB    Delia Amaya M.D., M.B.A., M.S.

## 2022-03-25 NOTE — PROGRESS NOTE ADULT - SUBJECTIVE AND OBJECTIVE BOX
OB Progress Note: VAVD PPD#3    S: 23yo PPD#3 s/p VAVD and RRT on postpartum day 0 2/2 syncopal episode + hypotension. Patient feels well. Pain is well controlled. She is tolerating a regular diet and passing flatus. She is voiding spontaneously, and ambulating without difficulty. Denies CP/SOB. Denies lightheadedness/dizziness. Denies N/V.    O:  Vitals:   Vital Signs Last 24 Hrs  T(C): 36.4 (25 Mar 2022 05:56), Max: 36.7 (24 Mar 2022 17:44)  T(F): 97.5 (25 Mar 2022 05:56), Max: 98.1 (24 Mar 2022 17:44)  HR: 71 (25 Mar 2022 05:56) (66 - 71)  BP: 107/72 (25 Mar 2022 05:56) (103/67 - 107/72)  BP(mean): --  RR: 18 (25 Mar 2022 05:56) (18 - 18)  SpO2: 100% (25 Mar 2022 05:56) (99% - 100%)    MEDICATIONS  (STANDING):  acetaminophen     Tablet .. 975 milliGRAM(s) Oral <User Schedule>  diphtheria/tetanus/pertussis (acellular) Vaccine (ADAcel) 0.5 milliLiter(s) IntraMuscular once  ibuprofen  Tablet. 600 milliGRAM(s) Oral every 6 hours  ketorolac   Injectable 30 milliGRAM(s) IV Push once  oxytocin Infusion 333.333 milliUNIT(s)/Min (1000 mL/Hr) IV Continuous <Continuous>  oxytocin Infusion 333.333 milliUNIT(s)/Min (1000 mL/Hr) IV Continuous <Continuous>  prenatal multivitamin 1 Tablet(s) Oral daily  sodium chloride 0.9% lock flush 3 milliLiter(s) IV Push every 8 hours    MEDICATIONS  (PRN):  benzocaine 20%/menthol 0.5% Spray 1 Spray(s) Topical every 6 hours PRN for Perineal discomfort  dibucaine 1% Ointment 1 Application(s) Topical every 6 hours PRN Perineal discomfort  diphenhydrAMINE 25 milliGRAM(s) Oral every 6 hours PRN Pruritus  hydrocortisone 1% Cream 1 Application(s) Topical every 6 hours PRN Moderate Pain (4-6)  lanolin Ointment 1 Application(s) Topical every 6 hours PRN nipple soreness  magnesium hydroxide Suspension 30 milliLiter(s) Oral two times a day PRN Constipation  oxyCODONE    IR 5 milliGRAM(s) Oral every 3 hours PRN Moderate to Severe Pain (4-10)  oxyCODONE    IR 5 milliGRAM(s) Oral once PRN Moderate to Severe Pain (4-10)  pramoxine 1%/zinc 5% Cream 1 Application(s) Topical every 4 hours PRN Moderate Pain (4-6)  simethicone 80 milliGRAM(s) Chew every 4 hours PRN Gas  witch hazel Pads 1 Application(s) Topical every 4 hours PRN Perineal discomfort      Labs:  Blood type: AB Negative  Rubella IgG: RPR: Negative                          9.9<L>   18.59<H> >-----------< 330    ( 03-22 @ 19:04 )             30.1<L>    03-22-22 @ 19:04      138  |  106  |  6<L>  ----------------------------<  88  3.5   |  20<L>  |  0.63        Ca    8.6      22 Mar 2022 19:04  Phos  3.4     03-22  Mg     1.60     03-22            Physical Exam:  General: NAD  Abdomen: soft, non-tender, non-distended, fundus firm  Vaginal: Lochia wnl  Extremities: No erythema. Trace edema

## 2022-03-30 ENCOUNTER — APPOINTMENT (OUTPATIENT)
Dept: CARDIOLOGY | Facility: HOSPITAL | Age: 22
End: 2022-03-30

## 2022-03-30 ENCOUNTER — NON-APPOINTMENT (OUTPATIENT)
Age: 22
End: 2022-03-30

## 2022-03-30 ENCOUNTER — OUTPATIENT (OUTPATIENT)
Dept: OUTPATIENT SERVICES | Facility: HOSPITAL | Age: 22
LOS: 1 days | End: 2022-03-30
Payer: MEDICAID

## 2022-03-30 VITALS
HEIGHT: 61 IN | BODY MASS INDEX: 29.45 KG/M2 | HEART RATE: 65 BPM | OXYGEN SATURATION: 99 % | SYSTOLIC BLOOD PRESSURE: 99 MMHG | WEIGHT: 156 LBS | DIASTOLIC BLOOD PRESSURE: 66 MMHG

## 2022-03-30 DIAGNOSIS — I25.10 ATHEROSCLEROTIC HEART DISEASE OF NATIVE CORONARY ARTERY WITHOUT ANGINA PECTORIS: ICD-10-CM

## 2022-03-30 DIAGNOSIS — R55 SYNCOPE AND COLLAPSE: ICD-10-CM

## 2022-03-30 PROCEDURE — 93005 ELECTROCARDIOGRAM TRACING: CPT

## 2022-03-30 PROCEDURE — G0463: CPT

## 2022-04-04 NOTE — REASON FOR VISIT
[Family Member] : family member [FreeTextEntry1] : 23 YO F no pmh from [facility] in the  for 1 year,  s/p baby boy Ehan via VAVD on 3/22/22 EBL 400cc. 2 hours after delivery patient stood up to go to the bathroom felt lightheadedness, with assitance of nurse walked to the bathroom sat to urinate. Shortly after standing felt dizzy again. Took 15 minutes to return to baseline. No history of heart disease. Has had episodes of syncope during pregnancy.. Otherwise, pregnancy has been uncomplicated. Continues to have chest pain when laying flat and worse with deep breath. Relieved with exercise. no palpitations. No leg swelling. \par \par FH: no family medical problems.\par SH: no smokers, no drugs, or alcohol, unemployed\par Meds: no meds or oral supplements. Tylenol PRN for pain. \par

## 2022-04-04 NOTE — ASSESSMENT
[FreeTextEntry1] : 23 YO s/p vaginal delivery complicated by syncope in the hospital \par \par syncope\par likely vasovagal\par will order echo\par no need for holter as suspicion low\par f/u 1 month\par \par

## 2022-04-05 DIAGNOSIS — R55 SYNCOPE AND COLLAPSE: ICD-10-CM

## 2022-04-16 ENCOUNTER — INPATIENT (INPATIENT)
Facility: HOSPITAL | Age: 22
LOS: 1 days | Discharge: HOME | End: 2022-04-18
Attending: INTERNAL MEDICINE | Admitting: INTERNAL MEDICINE
Payer: MEDICAID

## 2022-04-16 ENCOUNTER — TRANSCRIPTION ENCOUNTER (OUTPATIENT)
Age: 22
End: 2022-04-16

## 2022-04-16 VITALS
HEART RATE: 81 BPM | TEMPERATURE: 97 F | DIASTOLIC BLOOD PRESSURE: 66 MMHG | OXYGEN SATURATION: 100 % | SYSTOLIC BLOOD PRESSURE: 96 MMHG | RESPIRATION RATE: 16 BRPM | HEIGHT: 61 IN

## 2022-04-16 DIAGNOSIS — K80.20 CALCULUS OF GALLBLADDER WITHOUT CHOLECYSTITIS WITHOUT OBSTRUCTION: ICD-10-CM

## 2022-04-16 LAB
ALBUMIN SERPL ELPH-MCNC: 4.6 G/DL — SIGNIFICANT CHANGE UP (ref 3.3–5)
ALP SERPL-CCNC: 71 U/L — SIGNIFICANT CHANGE UP (ref 40–120)
ALT FLD-CCNC: 49 U/L — HIGH (ref 4–33)
ANION GAP SERPL CALC-SCNC: 15 MMOL/L — HIGH (ref 7–14)
APTT BLD: 28.8 SEC — SIGNIFICANT CHANGE UP (ref 27–36.3)
AST SERPL-CCNC: 44 U/L — HIGH (ref 4–32)
B PERT DNA SPEC QL NAA+PROBE: SIGNIFICANT CHANGE UP
B PERT+PARAPERT DNA PNL SPEC NAA+PROBE: SIGNIFICANT CHANGE UP
BASOPHILS # BLD AUTO: 0.06 K/UL — SIGNIFICANT CHANGE UP (ref 0–0.2)
BASOPHILS NFR BLD AUTO: 0.6 % — SIGNIFICANT CHANGE UP (ref 0–2)
BILIRUB SERPL-MCNC: <0.2 MG/DL — SIGNIFICANT CHANGE UP (ref 0.2–1.2)
BLD GP AB SCN SERPL QL: POSITIVE — SIGNIFICANT CHANGE UP
BORDETELLA PARAPERTUSSIS (RAPRVP): SIGNIFICANT CHANGE UP
BUN SERPL-MCNC: 15 MG/DL — SIGNIFICANT CHANGE UP (ref 7–23)
C PNEUM DNA SPEC QL NAA+PROBE: SIGNIFICANT CHANGE UP
CALCIUM SERPL-MCNC: 10.3 MG/DL — SIGNIFICANT CHANGE UP (ref 8.4–10.5)
CHLORIDE SERPL-SCNC: 100 MMOL/L — SIGNIFICANT CHANGE UP (ref 98–107)
CO2 SERPL-SCNC: 25 MMOL/L — SIGNIFICANT CHANGE UP (ref 22–31)
CREAT SERPL-MCNC: 0.69 MG/DL — SIGNIFICANT CHANGE UP (ref 0.5–1.3)
EGFR: 125 ML/MIN/1.73M2 — SIGNIFICANT CHANGE UP
EOSINOPHIL # BLD AUTO: 0.28 K/UL — SIGNIFICANT CHANGE UP (ref 0–0.5)
EOSINOPHIL NFR BLD AUTO: 2.8 % — SIGNIFICANT CHANGE UP (ref 0–6)
FLUAV SUBTYP SPEC NAA+PROBE: SIGNIFICANT CHANGE UP
FLUBV RNA SPEC QL NAA+PROBE: SIGNIFICANT CHANGE UP
GLUCOSE SERPL-MCNC: 110 MG/DL — HIGH (ref 70–99)
HADV DNA SPEC QL NAA+PROBE: SIGNIFICANT CHANGE UP
HCOV 229E RNA SPEC QL NAA+PROBE: SIGNIFICANT CHANGE UP
HCOV HKU1 RNA SPEC QL NAA+PROBE: SIGNIFICANT CHANGE UP
HCOV NL63 RNA SPEC QL NAA+PROBE: SIGNIFICANT CHANGE UP
HCOV OC43 RNA SPEC QL NAA+PROBE: SIGNIFICANT CHANGE UP
HCT VFR BLD CALC: 37 % — SIGNIFICANT CHANGE UP (ref 34.5–45)
HGB BLD-MCNC: 11.8 G/DL — SIGNIFICANT CHANGE UP (ref 11.5–15.5)
HMPV RNA SPEC QL NAA+PROBE: SIGNIFICANT CHANGE UP
HPIV1 RNA SPEC QL NAA+PROBE: SIGNIFICANT CHANGE UP
HPIV2 RNA SPEC QL NAA+PROBE: SIGNIFICANT CHANGE UP
HPIV3 RNA SPEC QL NAA+PROBE: SIGNIFICANT CHANGE UP
HPIV4 RNA SPEC QL NAA+PROBE: SIGNIFICANT CHANGE UP
IANC: 5.89 K/UL — SIGNIFICANT CHANGE UP (ref 1.8–7.4)
IMM GRANULOCYTES NFR BLD AUTO: 0.4 % — SIGNIFICANT CHANGE UP (ref 0–1.5)
INR BLD: 1.07 RATIO — SIGNIFICANT CHANGE UP (ref 0.88–1.16)
LYMPHOCYTES # BLD AUTO: 2.97 K/UL — SIGNIFICANT CHANGE UP (ref 1–3.3)
LYMPHOCYTES # BLD AUTO: 29.7 % — SIGNIFICANT CHANGE UP (ref 13–44)
M PNEUMO DNA SPEC QL NAA+PROBE: SIGNIFICANT CHANGE UP
MCHC RBC-ENTMCNC: 27.9 PG — SIGNIFICANT CHANGE UP (ref 27–34)
MCHC RBC-ENTMCNC: 31.9 GM/DL — LOW (ref 32–36)
MCV RBC AUTO: 87.5 FL — SIGNIFICANT CHANGE UP (ref 80–100)
MONOCYTES # BLD AUTO: 0.77 K/UL — SIGNIFICANT CHANGE UP (ref 0–0.9)
MONOCYTES NFR BLD AUTO: 7.7 % — SIGNIFICANT CHANGE UP (ref 2–14)
NEUTROPHILS # BLD AUTO: 5.89 K/UL — SIGNIFICANT CHANGE UP (ref 1.8–7.4)
NEUTROPHILS NFR BLD AUTO: 58.8 % — SIGNIFICANT CHANGE UP (ref 43–77)
NRBC # BLD: 0 /100 WBCS — SIGNIFICANT CHANGE UP
NRBC # FLD: 0 K/UL — SIGNIFICANT CHANGE UP
PLATELET # BLD AUTO: 375 K/UL — SIGNIFICANT CHANGE UP (ref 150–400)
POTASSIUM SERPL-MCNC: 4 MMOL/L — SIGNIFICANT CHANGE UP (ref 3.5–5.3)
POTASSIUM SERPL-SCNC: 4 MMOL/L — SIGNIFICANT CHANGE UP (ref 3.5–5.3)
PROT SERPL-MCNC: 7.7 G/DL — SIGNIFICANT CHANGE UP (ref 6–8.3)
PROTHROM AB SERPL-ACNC: 12.4 SEC — SIGNIFICANT CHANGE UP (ref 10.5–13.4)
RAPID RVP RESULT: SIGNIFICANT CHANGE UP
RBC # BLD: 4.23 M/UL — SIGNIFICANT CHANGE UP (ref 3.8–5.2)
RBC # FLD: 12.7 % — SIGNIFICANT CHANGE UP (ref 10.3–14.5)
RH IG SCN BLD-IMP: NEGATIVE — SIGNIFICANT CHANGE UP
RSV RNA SPEC QL NAA+PROBE: SIGNIFICANT CHANGE UP
RV+EV RNA SPEC QL NAA+PROBE: SIGNIFICANT CHANGE UP
SARS-COV-2 RNA SPEC QL NAA+PROBE: SIGNIFICANT CHANGE UP
SODIUM SERPL-SCNC: 140 MMOL/L — SIGNIFICANT CHANGE UP (ref 135–145)
WBC # BLD: 10.01 K/UL — SIGNIFICANT CHANGE UP (ref 3.8–10.5)
WBC # FLD AUTO: 10.01 K/UL — SIGNIFICANT CHANGE UP (ref 3.8–10.5)

## 2022-04-16 PROCEDURE — 71045 X-RAY EXAM CHEST 1 VIEW: CPT | Mod: 26

## 2022-04-16 PROCEDURE — 99285 EMERGENCY DEPT VISIT HI MDM: CPT

## 2022-04-16 PROCEDURE — 76705 ECHO EXAM OF ABDOMEN: CPT | Mod: 26

## 2022-04-16 PROCEDURE — 99232 SBSQ HOSP IP/OBS MODERATE 35: CPT | Mod: GC,57

## 2022-04-16 PROCEDURE — 86077 PHYS BLOOD BANK SERV XMATCH: CPT

## 2022-04-16 RX ORDER — ONDANSETRON 8 MG/1
4 TABLET, FILM COATED ORAL ONCE
Refills: 0 | Status: COMPLETED | OUTPATIENT
Start: 2022-04-16 | End: 2022-04-16

## 2022-04-16 RX ORDER — ACETAMINOPHEN 500 MG
1000 TABLET ORAL ONCE
Refills: 0 | Status: COMPLETED | OUTPATIENT
Start: 2022-04-16 | End: 2022-04-16

## 2022-04-16 RX ORDER — SODIUM CHLORIDE 9 MG/ML
1000 INJECTION INTRAMUSCULAR; INTRAVENOUS; SUBCUTANEOUS ONCE
Refills: 0 | Status: COMPLETED | OUTPATIENT
Start: 2022-04-16 | End: 2022-04-16

## 2022-04-16 RX ORDER — CEFOTETAN DISODIUM 1 G
VIAL (EA) INJECTION
Refills: 0 | Status: DISCONTINUED | OUTPATIENT
Start: 2022-04-16 | End: 2022-04-17

## 2022-04-16 RX ORDER — FAMOTIDINE 10 MG/ML
20 INJECTION INTRAVENOUS ONCE
Refills: 0 | Status: COMPLETED | OUTPATIENT
Start: 2022-04-16 | End: 2022-04-16

## 2022-04-16 RX ORDER — FENTANYL CITRATE 50 UG/ML
50 INJECTION INTRAVENOUS ONCE
Refills: 0 | Status: DISCONTINUED | OUTPATIENT
Start: 2022-04-16 | End: 2022-04-16

## 2022-04-16 RX ORDER — SODIUM CHLORIDE 9 MG/ML
1000 INJECTION, SOLUTION INTRAVENOUS
Refills: 0 | Status: DISCONTINUED | OUTPATIENT
Start: 2022-04-16 | End: 2022-04-17

## 2022-04-16 RX ORDER — LIDOCAINE 4 G/100G
10 CREAM TOPICAL ONCE
Refills: 0 | Status: DISCONTINUED | OUTPATIENT
Start: 2022-04-16 | End: 2022-04-16

## 2022-04-16 RX ORDER — CEFOTETAN DISODIUM 1 G
2 VIAL (EA) INJECTION ONCE
Refills: 0 | Status: COMPLETED | OUTPATIENT
Start: 2022-04-16 | End: 2022-04-16

## 2022-04-16 RX ORDER — LIDOCAINE 4 G/100G
10 CREAM TOPICAL ONCE
Refills: 0 | Status: COMPLETED | OUTPATIENT
Start: 2022-04-16 | End: 2022-04-16

## 2022-04-16 RX ORDER — KETOROLAC TROMETHAMINE 30 MG/ML
15 SYRINGE (ML) INJECTION ONCE
Refills: 0 | Status: DISCONTINUED | OUTPATIENT
Start: 2022-04-16 | End: 2022-04-16

## 2022-04-16 RX ORDER — ENOXAPARIN SODIUM 100 MG/ML
40 INJECTION SUBCUTANEOUS EVERY 24 HOURS
Refills: 0 | Status: DISCONTINUED | OUTPATIENT
Start: 2022-04-16 | End: 2022-04-18

## 2022-04-16 RX ORDER — CEFOTETAN DISODIUM 1 G
2 VIAL (EA) INJECTION EVERY 12 HOURS
Refills: 0 | Status: DISCONTINUED | OUTPATIENT
Start: 2022-04-17 | End: 2022-04-17

## 2022-04-16 RX ADMIN — Medication 100 GRAM(S): at 13:48

## 2022-04-16 RX ADMIN — FENTANYL CITRATE 50 MICROGRAM(S): 50 INJECTION INTRAVENOUS at 07:54

## 2022-04-16 RX ADMIN — Medication 30 MILLILITER(S): at 04:11

## 2022-04-16 RX ADMIN — SODIUM CHLORIDE 1000 MILLILITER(S): 9 INJECTION INTRAMUSCULAR; INTRAVENOUS; SUBCUTANEOUS at 05:24

## 2022-04-16 RX ADMIN — LIDOCAINE 10 MILLILITER(S): 4 CREAM TOPICAL at 04:11

## 2022-04-16 RX ADMIN — FENTANYL CITRATE 50 MICROGRAM(S): 50 INJECTION INTRAVENOUS at 08:30

## 2022-04-16 RX ADMIN — Medication 15 MILLIGRAM(S): at 04:27

## 2022-04-16 RX ADMIN — SODIUM CHLORIDE 125 MILLILITER(S): 9 INJECTION, SOLUTION INTRAVENOUS at 13:48

## 2022-04-16 RX ADMIN — FAMOTIDINE 20 MILLIGRAM(S): 10 INJECTION INTRAVENOUS at 03:43

## 2022-04-16 RX ADMIN — Medication 1000 MILLIGRAM(S): at 08:30

## 2022-04-16 RX ADMIN — Medication 400 MILLIGRAM(S): at 07:54

## 2022-04-16 RX ADMIN — FENTANYL CITRATE 50 MICROGRAM(S): 50 INJECTION INTRAVENOUS at 05:24

## 2022-04-16 RX ADMIN — Medication 15 MILLIGRAM(S): at 08:30

## 2022-04-16 RX ADMIN — Medication 1000 MILLIGRAM(S): at 08:00

## 2022-04-16 RX ADMIN — ONDANSETRON 4 MILLIGRAM(S): 8 TABLET, FILM COATED ORAL at 03:42

## 2022-04-16 NOTE — PATIENT PROFILE ADULT - NSPROPTRIGHTSUPPORTPERSON_GEN_A_NUR
Pediatric Endocrinology Follow-up Consultation    Patient: Augusto Walters MRN# [id number]   YOB: 2004 Age: 14 year 1 month old   Date of Visit: Dec 13, 2018    Dear Dr. Larsen:    I had the pleasure of seeing your patient, Augusto Walters in the Pediatric Endocrinology Clinic, [facility], on Dec 13, 2018 for a follow-up consultation of premature adrenarche.           Problem list:     Patient Active Problem List    Diagnosis Date Noted     Growth deceleration 01/11/2016     Priority: Medium     Premature adrenarche (H) 09/04/2014     Priority: Medium     Elevated alkaline phosphatase level 09/04/2014     Priority: Medium            HPI:   Augusto Walters is a 14 year 1 month old whom I initially evaluated on 9/4/14 for concerns of premature adrenarche. Review of his growth charts at that time showed Augusto was at the 43rd percentile (75.4lb) for weight and 13th percentile (53.3in) for height, with a growth velocity of 5.8cm/year (62nd percentile, SD 0.30).       Augusto's parents became concerned about his development when they learned that a child they know was evaluated at age 11 years for rapid pubertal development, but it was too late for any intervention that would improve his growth.         INTERIM HISTORY: Since last visit on 4/23/18, Augusto fractured one of his fingers during a gymnastics competition in the spring. Otherwise, he has been healthy with no illnesses requiring hospitalizations.    Appetite has been normal. Augusto takes a calcium tablet because he does not get enough dairy in his diet. He sometimes forgets to take his supplement.    Augusto uses his Albuterol as needed. During the winter season he usually requires Flovent.    He has had facial hair development on his upper lip for about one year. He has not yet started shaving.    History was obtained from patient and patient's father.          Social History:     Augusto is in 9th grade. He continues to be a  "competitive gymnast.    Social history was reviewed and is unchanged. Refer to the initial note.         Family History:     Family history was reviewed and is unchanged. Refer to the initial note.         Allergies:     Allergies   Allergen Reactions     Pollen Extract              Medications:     Current Outpatient Medications   Medication Sig Dispense Refill     ALBUTEROL IN Inhale into the lungs as needed        cetirizine (ZYRTEC) 10 MG tablet Take 10 mg by mouth as needed        fluticasone (FLOVENT HFA) 44 MCG/ACT inhaler Inhale 1 puff into the lungs 2 times daily       Fluticasone Propionate (FLONASE NA) Spray in nostril as needed                Review of Systems:   Gen: Negative  Eye: Negative, no vision concerns.  ENT: Negative, no hearing concerns.  Pulmonary:  Negative, no coughing or wheezing.    Cardio: Negative, no dizziness or fainting.   Gastrointestinal: Negative, no GI concerns.  Hematologic: Negative, no bruising or bleeding concerns.  Genitourinary: Negative, no bladder concerns.  Musculoskeletal: See HPI.  Psychiatric: Negative  Neurologic: Negative, no headaches.  Skin: Minor facial acne treated with peroxide cream.  Endocrine: See HPI. Facial hair development on upper lip for about one year. He has not yet started shaving.            Physical Exam:   Blood pressure 107/64, pulse 83, height 1.633 m (5' 4.29\"), weight 58.9 kg (129 lb 13.6 oz).  Blood pressure percentiles are 36 % systolic and 52 % diastolic based on the 2017 AAP Clinical Practice Guideline. Blood pressure percentile targets: 90: 126/77, 95: 130/80, 95 + 12 mmH/92.  Height: 163.3 cm  18 %ile based on CDC (Boys, 2-20 Years) Stature-for-age data based on Stature recorded on 2018.  Weight: 58.9 kg (actual weight), 57 %ile based on CDC (Boys, 2-20 Years) weight-for-age data based on Weight recorded on 2018.  BMI: Body mass index is 22.09 kg/m . 75 %ile based on CDC (Boys, 2-20 Years) BMI-for-age based on " body measurements available as of 12/13/2018.    Growth velocity: 6.4 cm/yr (95th percentile)   GENERAL:  He is alert and in no apparent distress.   HEENT:  Head is  normocephalic and atraumatic.  Pupils equal, round and reactive to light and accommodation.  Extraocular movements are intact.  Funduscopic exam shows crisp disc margins and normal venous pulsations.  Nares are clear.  Oropharynx shows normal dentition uvula and palate.  Tympanic membranes visualized and clear.   NECK:  Supple.  Thyroid palpable, smooth with no nodules, it is not enlarged.   LUNGS:  Clear to auscultation bilaterally.   CARDIOVASCULAR:  Regular rate and rhythm without murmur, gallop or rub.   BREASTS:  Alexx I.  Axillary hair, odor and sweat were absent.   ABDOMEN:  Nondistended.  Positive bowel sounds, soft and nontender.  No hepatosplenomegaly or masses palpable.   GENITOURINARY EXAM:  Pubic hair is Alexx IV.  Testes 4.0 cm in length on right, 4.0 cm in length on left.  Phallus Alexx IV, circumcised.   MUSCULOSKELETAL:  Normal muscle bulk and tone.  No evidence of scoliosis.   NEUROLOGIC:  Cranial nerves II-XII tested and intact.  Deep tendon reflexes 2+ and symmetric.   SKIN: Minor facial acne. Hair present on upper lip.         Laboratory results:   9/29/16  IGF-1 to [facility]:                     161 ng/dL (146-541, Alexx 2-3: 127-543)  IGF-1 Z-Score:                      -1.6 SDS     3/31/17  IGF-1 to [facility]:                     271 ng/dL                  (168-576, Alexx 3: 158-614)  IGF-1 Z-Score:                      -0.6 SDS      8/21/17  IGF-1 to [facility]:                     394 ng/dL                  (168-576, Alexx 3: 158-614)  IGF-1 Z-Score:                      +0.6 SDS               Orders Only on 08/21/2017   Component Date Value Ref Range Status     IGF Binding Protein3 08/21/2017 5.2  3.1 - 10.0 ug/mL Final     Comment: IGFBP-3 Alexx Stage     Male Reference Ranges     Alexx Stage Range (ng/mL)  Mean     SD  _______________________________________  1            1.4 - 5.2      3.3     1.0  2            2.3 - 6.3      4.3     1.0  3            3.1 - 8.9      6.0     1.5  4            3.7 - 8.7      6.2     1.3  5            2.6 - 8.6      5.6     1.5           IGF Binding Protein 3 SD Score 08/21/2017 NEG 0.8    Final     Sodium 08/21/2017 140  133 - 143 mmol/L Final     Potassium 08/21/2017 4.4  3.4 - 5.3 mmol/L Final     Chloride 08/21/2017 108  98 - 110 mmol/L Final     Carbon Dioxide 08/21/2017 22  20 - 32 mmol/L Final     Anion Gap 08/21/2017 10  3 - 14 mmol/L Final     Glucose 08/21/2017 91  70 - 99 mg/dL Final     Urea Nitrogen 08/21/2017 17  7 - 21 mg/dL Final     Creatinine 08/21/2017 0.66  0.39 - 0.73 mg/dL Final     Calcium 08/21/2017 8.6* 9.1 - 10.3 mg/dL Final     Bilirubin Total 08/21/2017 0.4  0.2 - 1.3 mg/dL Final     Albumin 08/21/2017 3.5  3.4 - 5.0 g/dL Final     Protein Total 08/21/2017 7.0  6.8 - 8.8 g/dL Final     Alkaline Phosphatase 08/21/2017 542* 130 - 530 U/L Final     ALT 08/21/2017 28  0 - 50 U/L Final     AST 08/21/2017 41* 0 - 35 U/L Final     TSH 08/21/2017 3.35  0.40 - 4.00 mU/L Final     T4 Free 08/21/2017 1.00  0.76 - 1.46 ng/dL Final     Lutropin 08/21/2017 1.1  0.3 - 6.0 IU/L Final     Comment: LH Male Alexx Stages  Stage I:  0.3-2.7 IU/L  Stage II:  0.3-5.1 IU/L  Stage III:  0.3-6.9 IU/L  Stage IV:  0.5-5.3 IU/L  Stage V:  0.8-11.8 IU/L        FSH 08/21/2017 3.9  0.5 - 10.7 IU/L Final     Testosterone Total 08/21/2017 109  0 - 800 ng/dL Final     Comment: This test was developed and its performance characteristics determined by the   [facility],  Special Chemistry Laboratory. It has   not been cleared or approved by the FDA. The laboratory is regulated under   CLIA as qualified to perform high-complexity testing. This test is used for   clinical purposes. It should not be regarded as investigational or for   research.                Results for orders  placed or performed in visit on 04/23/18   X-ray Bone age hand pediatrics (TO BE DONE TODAY)     Narrative     XR HAND BONE AGE  4/23/2018 12:36 PM     HISTORY: ; Premature adrenarche (H)     COMPARISON: 12/21/2017     FINDINGS:   The patient's chronologic age is 13 years 6 months.  The patient's bone age is 13 years.   Two standard deviations of the mean for a Male at this chronologic age  is 26 months.        Impression     IMPRESSION: Normal bone age.     PURA CHRISTENSEN MD   IGFBP-3   Result Value Ref Range     IGF Binding Protein3 6.4 3.3 - 10.3 ug/mL     IGF Binding Protein 3 SD Score NEG 0.2        12/21/17  IGF-1 to [facility]:  325 ng/dL          (187-599, Alexx 3: 192-689)  IGF-1 Z-Score:  -0.3 SDS      4/23/18  IGF-1 to [facility]:           364 ng/dL        (187-599, Alexx 3: 192-689)  IGF-1 Z-Score:            0.0 SDS     Results for orders placed or performed in visit on 12/13/18   X-ray Bone age hand pediatrics (TO BE DONE TODAY)    Narrative    XR HAND BONE AGE 12/13/2018 8:34 AM.    HISTORY: Growth deceleration.    COMPARISON: 4/23/2018, 12/21/2017, 9/29/2016.    FINDINGS:   The patient's chronologic age is 14 years, 2 months.  The patient's bone age is 13 years.   Two standard deviations of the mean for a male at this chronologic age  is 28 months.    Stable ulnar styloid process fragmentation and decreased distal  physeal widening compared to the previous exam.      Impression    IMPRESSION:   Normal bone age. No significant interval maturation.    I have personally reviewed the examination and initial interpretation  and I agree with the findings.    HENRIK RIVERA MD           Assessment and Plan:   1. Growth Deceleration   2. History of premature adrenarche    Since the last visit on 4/23/18, Augusto's weight increased from 55.7 kg at the 57th percentile to 58.9 kg at the 57th percentile. In the same time frame, height increased from 159.2 cm at the 16th percentile to 163.3 cm at the 17th percentile. Augusto  has had a significant growth spurt since our last visit and continues along a height trajectory that will put him above the Mid-parental Target Height. Augusto is showing appropriate pubertal progress. We will order a bone age xray today to determine how much room Augusto has left to grow.     Since Augusto is on track to reach a height that is normal for his genetic potential, he does not require any further endocrine follow up.    MD Instructions:  No further endocrinology follow-up is necessary unless other endocrine concerns arise.     The following image was obtained today:  Orders Placed This Encounter   Procedures     X-ray Bone age hand pediatrics (TO BE DONE TODAY)     RESULTS INTERPRETATION: The bone age remains mildly delayed and has not significantly changed since April. At a bone age of 13 years, the average boy has completed 92.7% to 95.8% of their growth.     Based upon these test results and Augusto's current height, his adult height prediction would be 170.5 cm (67.1 inches) to 176.2 cm (69.4 inches).    This document serves as a record of the services and decisions personally performed and made by Arcenio Morelos MD, PhD. It was created on his behalf by Horace Pfeiffer, a trained medical scribe. The creation of this document is based on the provider's statements to the medical scribe.    Thank you for allowing me to participate in the care of your patient.  Please do not hesitate to call with questions or concerns.    Sincerely,  I personally performed the entire clinical encounter documented in this note.    Arcenio Morelos MD, PhD  Professor  Pediatric Endocrinology  [facility]  Phone: [phone]  Fax:   [phone]     Total face-to-face time 25 minutes, >50% of time spent counseling and coordination of care regarding assessment and plan described above.     CC  Patient Care Team:  Chloe Larsen MD as PCP - General (Pediatric  Hematology/Oncology)  Arcenio Morelos MD as MD (Pediatrics)     Parents of Augusto Smith Fu  [address]  [facility] [id number]              declines

## 2022-04-16 NOTE — ED PROVIDER NOTE - CLINICAL SUMMARY MEDICAL DECISION MAKING FREE TEXT BOX
23 yo F with recent delivery in March 2022 presenting with chest pressure and discomfort. This is concerning for GERD vs. gastritis vs. pneumothorax vs. pleuritis. PT has had extensive workup with negative troponins, is following with cardiology. Previously had improvement with GERD meds. will give famotidine, zofran, toradol, maalox and viscous lidocaine and reassess.

## 2022-04-16 NOTE — ED PROVIDER NOTE - PHYSICAL EXAMINATION
General: WN/WD NAD  Head: Atraumatic, normocephalic  Eyes: EOM grossly in tact, no scleral icterus, no discharge  ENT: moist mucous membranes  Neurology: A&Ox 3, nonfocal, MEYERS x 4  Respiratory: CTAB, no wheezing, normal respiratory effort  CV: RRR, good s1/s2, no S3, Extremities warm and well perfused  Abdominal: Soft, non-distended, non-tender, no masses  Extremities: No edema, no deformities  Skin: warm and dry. No rashes  Chest: PT has pain with palpation of ribs both anteriorly and posteriorly

## 2022-04-16 NOTE — ED ADULT NURSE REASSESSMENT NOTE - NS ED NURSE REASSESS COMMENT FT1
pt c/t have pain relief from am meds given by night RN. pt and SI awaiting  surgery MDs for eval. no s/s of acute distress.

## 2022-04-16 NOTE — H&P ADULT - NSHPLABSRESULTS_GEN_ALL_CORE
T(C): 36.7 (04-16-22 @ 10:43), Max: 36.7 (04-16-22 @ 10:43)  HR: 71 (04-16-22 @ 10:43) (67 - 81)  BP: 107/67 (04-16-22 @ 10:43) (96/66 - 107/67)  RR: 18 (04-16-22 @ 10:43) (16 - 18)  SpO2: 100% (04-16-22 @ 10:43) (100% - 100%)    LABS:                        11.8   10.01 )-----------( 375      ( 16 Apr 2022 03:56 )             37.0     16 Apr 2022 03:56    140    |  100    |  15     ----------------------------<  110    4.0     |  25     |  0.69     Ca    10.3       16 Apr 2022 03:56    TPro  7.7    /  Alb  4.6    /  TBili  <0.2   /  DBili  x      /  AST  44     /  ALT  49     /  AlkPhos  71     16 Apr 2022 03:56      US Abdomen Upper Quadrant Right (04.16.22 @ 05:55)    FINDINGS:  Liver: Within normal limits.  Bile ducts: Normal caliber. Common bile duct measures 3 mm.  Gallbladder: Cholelithiasis.  No focal tenderness or evidence of   cholecystitis.  Pancreas:Visualized portions are within normal limits.  Right kidney: 11.4 cm. No hydronephrosis.  Ascites: None.  IVC: Visualized portions are normal in caliber.    IMPRESSION:  Cholelithiasis without evidence of acute cholecystitis.

## 2022-04-16 NOTE — ED ADULT TRIAGE NOTE - CHIEF COMPLAINT QUOTE
pt c/o 30min constant chest pain constant. endorses similar symptoms in past. family at bedside states pt has recent syncope. denies PMHx.

## 2022-04-16 NOTE — H&P ADULT - ATTENDING COMMENTS
K81.0 Cholecystitis, acute   -Imaging and clinical status suggestive of acute calculous cholecystitis   -Preop for laparoscopic cholecystectomy  -IV abx, pain control, IV fluid resuscitation   -Risks of biliary injury, drain placement and need for postop intervention such as reoperation and endoscopy discussed  -Consent taken in english as the patient is comfortable in both english and French.      Jono Escamilla MD  Acute and Critical Care Surgery    The Acute Care Surgery (B Team) Attending Group Practice:  Dr. Zohaib Durbin, Dr. Joe Romano, Dr. Jono Escamilla,  Dr. Julio Messer and Dr. Kaylyn Abreu     Urgent issues - spectra [phone] or [id number]  Nonurgent issues - [phone]  Patient appointments or after hours - [phone]

## 2022-04-16 NOTE — ED PROVIDER NOTE - PROGRESS NOTE DETAILS
Marva Pickett, PGY-1: The patient was re-examined after pain medications, still in significant amount of pain, will give additional fentanyl. paged surgery for biliary colic. Jayme, PGY1 - Received sign-out on patient. Introduced myself and updated patient on the medical evaluation process. Patient aware and in agreement. Ofirmev added to pain meds, surgery aware of patient and will see. Jayme, PGY1 - Patient agreeing to surgery at this time. Will be admitted under Dr. Jono Escamilla

## 2022-04-16 NOTE — ED PROVIDER NOTE - ATTENDING CONTRIBUTION TO CARE
HPI: 21 yo F with recent delivery in 2022 presenting with chest pressure and discomfort. Pt experienced discomfort prior to her delivery and was diagnosed with gastritis. Pt delivered and afterwards had similar chest pressure and 2 episodes of syncope. Pt had extensive workup including ecg, troponins and labs and was found to have a RBBB that had resolved by time of discharge. Pt saw a cardiologist outpatient and had a ecg, pt scheduled for an echo next week. Pt describes the pain as constant in epigastrium radiating across upper abd and worse with movement. Not worse with food intake or other ingestions, no trauma, rashes, fever, chills, N/V/D.   EXAM: Uncomfortable appearing, tenderness to palpation bilateral epigastrium with + murphys, neg rebound/guarding, neg mcburneys, no CVAT, heart RRR, lungs ctab.   MDM: pt with no Past Medical History that had recent  that presents with epigastric pain radiating across upper abd. Is being worked up for cardiac issues by Cards. Unlikely ACS. Likely gastritis which she has been diagnosed with vs gallstones. Will obtain labs, imaging, provide meds and IVF and reassess.

## 2022-04-16 NOTE — ED PROVIDER NOTE - NSCAREINITIATED _GEN_ER
Patient Instructions by Marquez Sepulveda MD at 03/12/18 07:19 PM     Author:  Marquez Sepulveda MD Service:  (none) Author Type:  Physician     Filed:  03/12/18 07:20 PM Encounter Date:  3/12/2018 Status:  Addendum     :  Marquez Sepulveda MD (Physician)            Assessment and Plan:    Bronchospasm (airway reactivity/inflammation)    Plan: Stable and well controlled from a clinical and lung function (mild obstruction) standpoint on low dose advair. [place]'s spirometry may not be an accurate indicator of his airway health (mild obstruction today) due to technique. Normal CXR 9/2015. Montelukast caused increased anxiety, nail biting which improved when stopped.        · Continue Advair 115/21 1 puff twice daily. Consistent use emphasized.  May increase to 2 puffs twice daily for 1-2 weeks at the start of colds.  · Continue albuterol 2 puffs or nebs every 4 hours as needed should respiratory symptoms arise.        Chronic non-allergic rhinitis (nasal inflammation not caused by allergies); Enlarged adenoids  Plan: Skin prick testing was negative to the full peds aeroallergen panel 04/2017, and 03/2015 with Dr. Kilgore. Normal sinus XR 10/2016.     ENT (Dr. Vernon) formally recommended adenoidectomy due to persistent nasal symptoms/throat clearing and enlarged adenoids noted on nasal endoscopy. We discussed this in detail and I agree that this will likely improve his reported symptoms.      · Continue Flonase (fluticasone) 1 spray(s) each nostril once daily as needed.  May increase to 2 sprays each nostril once daily for 1 week with colds.  · Continue nasal irrigation with distilled room temperature water mixed with saline packets once daily as needed.  · Follow up with ENT for adenoidectomy when able.     Follow up: 09/2018          Revision History        User Key Date/Time User Provider Type Action    > [N/A] 03/12/18 07:20 PM Marquez Sepulveda MD Physician Addend     [N/A] 03/12/18 07:20 PM Marquez Sepulveda MD Physician Sign  Marva Pickett(Resident)

## 2022-04-16 NOTE — H&P ADULT - ASSESSMENT
22F w/ hx of recent pregnancy (delivered March 2022) presenting with cholecystitis with 1 day of symptoms.     - Admit to surgery  - NPO, IV fluids, abx  - Booked for laparoscopic cholecystectomy today  - Will obtain consent  - Discussed with surgical attending, Dr. Francine VILLALOBOS Team / Acute Care Surgery  Pager [phone]

## 2022-04-16 NOTE — ED ADULT NURSE NOTE - OBJECTIVE STATEMENT
received pt in room 7 with  at bedside.  pt c/o severe upper abd pain, nausea, vomiting.  writhing and groaning in pain.  20 gauge inserted left ac. bloods sent.  given meds.. see emar.  u/a to tolerate oral meds. Toradol pending ucg.

## 2022-04-16 NOTE — PATIENT PROFILE ADULT - FALL HARM RISK - UNIVERSAL INTERVENTIONS
Bed in lowest position, wheels locked, appropriate side rails in place/Call bell, personal items and telephone in reach/Instruct patient to call for assistance before getting out of bed or chair/Non-slip footwear when patient is out of bed/[place] to call system/Physically safe environment - no spills, clutter or unnecessary equipment/Purposeful Proactive Rounding/Room/bathroom lighting operational, light cord in reach

## 2022-04-16 NOTE — ED PROVIDER NOTE - OBJECTIVE STATEMENT
21 yo F with recent delivery in March 2022 presenting with chest pressure and discomfort. Pt experienced discomfort prior to her delivery and was diagnosed with gastritis. Pt delivered and afterwards had similar chest pressure and 2 episodes of syncope. Pt had extensive workup including ecg, troponins and labs and was found to have a RBBB that had resolved by time of discharge. Pt saw a cardiologist outpatient and had a ecg, pt scheduled for an echo next week. Pt describes the pain as constant and worse with movement. Pt denies radiation.

## 2022-04-16 NOTE — H&P ADULT - HISTORY OF PRESENT ILLNESS
22F w/ no medical/surgical history presenting with upper abdominal pain that started last night. The pain has been constant and severe and she has vomited several times. Denies fevers, chills, constipation, diarrhea. She was recently pregnant (delivered in March 2022) and began having similar episodes during the pregnancy that resolved each time. She has had 5 or 6 episodes.    She takes no medications. She had a syncopal episode during her pregnancy and has recently seen a cardiologist for work up and was planning to get an echo next week.    In the ED she is afebrile, WBC 10, Cr 0.69, bilirubin 0.2, lipase 64, US shows cholelithiasis, CBD is 3mm.

## 2022-04-16 NOTE — ED ADULT NURSE NOTE - INTERVENTIONS DEFINITIONS
Call bell, personal items and telephone within reach/Instruct patient to call for assistance/Non-slip footwear when patient is off stretcher/Physically safe environment: no spills, clutter or unnecessary equipment

## 2022-04-17 ENCOUNTER — RESULT REVIEW (OUTPATIENT)
Age: 22
End: 2022-04-17

## 2022-04-17 LAB
ALBUMIN SERPL ELPH-MCNC: 4.1 G/DL — SIGNIFICANT CHANGE UP (ref 3.3–5)
ALP SERPL-CCNC: 106 U/L — SIGNIFICANT CHANGE UP (ref 40–120)
ALT FLD-CCNC: 129 U/L — HIGH (ref 4–33)
ANION GAP SERPL CALC-SCNC: 12 MMOL/L — SIGNIFICANT CHANGE UP (ref 7–14)
ANION GAP SERPL CALC-SCNC: 15 MMOL/L — HIGH (ref 7–14)
AST SERPL-CCNC: 73 U/L — HIGH (ref 4–32)
BILIRUB SERPL-MCNC: 0.3 MG/DL — SIGNIFICANT CHANGE UP (ref 0.2–1.2)
BUN SERPL-MCNC: 11 MG/DL — SIGNIFICANT CHANGE UP (ref 7–23)
BUN SERPL-MCNC: 11 MG/DL — SIGNIFICANT CHANGE UP (ref 7–23)
CALCIUM SERPL-MCNC: 9 MG/DL — SIGNIFICANT CHANGE UP (ref 8.4–10.5)
CALCIUM SERPL-MCNC: 9.2 MG/DL — SIGNIFICANT CHANGE UP (ref 8.4–10.5)
CHLORIDE SERPL-SCNC: 104 MMOL/L — SIGNIFICANT CHANGE UP (ref 98–107)
CHLORIDE SERPL-SCNC: 104 MMOL/L — SIGNIFICANT CHANGE UP (ref 98–107)
CO2 SERPL-SCNC: 23 MMOL/L — SIGNIFICANT CHANGE UP (ref 22–31)
CO2 SERPL-SCNC: 25 MMOL/L — SIGNIFICANT CHANGE UP (ref 22–31)
CREAT SERPL-MCNC: 0.76 MG/DL — SIGNIFICANT CHANGE UP (ref 0.5–1.3)
CREAT SERPL-MCNC: 0.89 MG/DL — SIGNIFICANT CHANGE UP (ref 0.5–1.3)
EGFR: 113 ML/MIN/1.73M2 — SIGNIFICANT CHANGE UP
EGFR: 93 ML/MIN/1.73M2 — SIGNIFICANT CHANGE UP
GLUCOSE BLDC GLUCOMTR-MCNC: 142 MG/DL — HIGH (ref 70–99)
GLUCOSE SERPL-MCNC: 108 MG/DL — HIGH (ref 70–99)
GLUCOSE SERPL-MCNC: 142 MG/DL — HIGH (ref 70–99)
HCT VFR BLD CALC: 33 % — LOW (ref 34.5–45)
HCT VFR BLD CALC: 36.3 % — SIGNIFICANT CHANGE UP (ref 34.5–45)
HGB BLD-MCNC: 10.9 G/DL — LOW (ref 11.5–15.5)
HGB BLD-MCNC: 11.7 G/DL — SIGNIFICANT CHANGE UP (ref 11.5–15.5)
MAGNESIUM SERPL-MCNC: 2.1 MG/DL — SIGNIFICANT CHANGE UP (ref 1.6–2.6)
MAGNESIUM SERPL-MCNC: 2.1 MG/DL — SIGNIFICANT CHANGE UP (ref 1.6–2.6)
MCHC RBC-ENTMCNC: 28.2 PG — SIGNIFICANT CHANGE UP (ref 27–34)
MCHC RBC-ENTMCNC: 28.2 PG — SIGNIFICANT CHANGE UP (ref 27–34)
MCHC RBC-ENTMCNC: 32.2 GM/DL — SIGNIFICANT CHANGE UP (ref 32–36)
MCHC RBC-ENTMCNC: 33 GM/DL — SIGNIFICANT CHANGE UP (ref 32–36)
MCV RBC AUTO: 85.3 FL — SIGNIFICANT CHANGE UP (ref 80–100)
MCV RBC AUTO: 87.5 FL — SIGNIFICANT CHANGE UP (ref 80–100)
NRBC # BLD: 0 /100 WBCS — SIGNIFICANT CHANGE UP
NRBC # BLD: 0 /100 WBCS — SIGNIFICANT CHANGE UP
NRBC # FLD: 0 K/UL — SIGNIFICANT CHANGE UP
NRBC # FLD: 0 K/UL — SIGNIFICANT CHANGE UP
PHOSPHATE SERPL-MCNC: 3.9 MG/DL — SIGNIFICANT CHANGE UP (ref 2.5–4.5)
PHOSPHATE SERPL-MCNC: 4.5 MG/DL — SIGNIFICANT CHANGE UP (ref 2.5–4.5)
PLATELET # BLD AUTO: 372 K/UL — SIGNIFICANT CHANGE UP (ref 150–400)
PLATELET # BLD AUTO: 387 K/UL — SIGNIFICANT CHANGE UP (ref 150–400)
POTASSIUM SERPL-MCNC: 3.7 MMOL/L — SIGNIFICANT CHANGE UP (ref 3.5–5.3)
POTASSIUM SERPL-MCNC: 4 MMOL/L — SIGNIFICANT CHANGE UP (ref 3.5–5.3)
POTASSIUM SERPL-SCNC: 3.7 MMOL/L — SIGNIFICANT CHANGE UP (ref 3.5–5.3)
POTASSIUM SERPL-SCNC: 4 MMOL/L — SIGNIFICANT CHANGE UP (ref 3.5–5.3)
PROT SERPL-MCNC: 6.8 G/DL — SIGNIFICANT CHANGE UP (ref 6–8.3)
RBC # BLD: 3.87 M/UL — SIGNIFICANT CHANGE UP (ref 3.8–5.2)
RBC # BLD: 4.15 M/UL — SIGNIFICANT CHANGE UP (ref 3.8–5.2)
RBC # FLD: 12.9 % — SIGNIFICANT CHANGE UP (ref 10.3–14.5)
RBC # FLD: 12.9 % — SIGNIFICANT CHANGE UP (ref 10.3–14.5)
SODIUM SERPL-SCNC: 141 MMOL/L — SIGNIFICANT CHANGE UP (ref 135–145)
SODIUM SERPL-SCNC: 142 MMOL/L — SIGNIFICANT CHANGE UP (ref 135–145)
WBC # BLD: 10.22 K/UL — SIGNIFICANT CHANGE UP (ref 3.8–10.5)
WBC # BLD: 8.45 K/UL — SIGNIFICANT CHANGE UP (ref 3.8–10.5)
WBC # FLD AUTO: 10.22 K/UL — SIGNIFICANT CHANGE UP (ref 3.8–10.5)
WBC # FLD AUTO: 8.45 K/UL — SIGNIFICANT CHANGE UP (ref 3.8–10.5)

## 2022-04-17 PROCEDURE — 88304 TISSUE EXAM BY PATHOLOGIST: CPT | Mod: 26

## 2022-04-17 PROCEDURE — 47562 LAPAROSCOPIC CHOLECYSTECTOMY: CPT | Mod: GC

## 2022-04-17 DEVICE — LIGATING CLIPS WECK HEMOLOK POLYMER MEDIUM-LARGE (GREEN) 6: Type: IMPLANTABLE DEVICE | Status: FUNCTIONAL

## 2022-04-17 RX ORDER — OXYCODONE HYDROCHLORIDE 5 MG/1
5 TABLET ORAL EVERY 4 HOURS
Refills: 0 | Status: DISCONTINUED | OUTPATIENT
Start: 2022-04-17 | End: 2022-04-18

## 2022-04-17 RX ORDER — ACETAMINOPHEN 500 MG
650 TABLET ORAL EVERY 6 HOURS
Refills: 0 | Status: DISCONTINUED | OUTPATIENT
Start: 2022-04-17 | End: 2022-04-17

## 2022-04-17 RX ORDER — ACETAMINOPHEN 500 MG
650 TABLET ORAL EVERY 6 HOURS
Refills: 0 | Status: DISCONTINUED | OUTPATIENT
Start: 2022-04-17 | End: 2022-04-18

## 2022-04-17 RX ORDER — SODIUM CHLORIDE 9 MG/ML
1000 INJECTION, SOLUTION INTRAVENOUS
Refills: 0 | Status: DISCONTINUED | OUTPATIENT
Start: 2022-04-17 | End: 2022-04-18

## 2022-04-17 RX ORDER — OXYCODONE HYDROCHLORIDE 5 MG/1
5 TABLET ORAL EVERY 4 HOURS
Refills: 0 | Status: DISCONTINUED | OUTPATIENT
Start: 2022-04-17 | End: 2022-04-17

## 2022-04-17 RX ORDER — LIDOCAINE 4 G/100G
15 CREAM TOPICAL ONCE
Refills: 0 | Status: COMPLETED | OUTPATIENT
Start: 2022-04-17 | End: 2022-04-17

## 2022-04-17 RX ORDER — IBUPROFEN 200 MG
400 TABLET ORAL EVERY 6 HOURS
Refills: 0 | Status: DISCONTINUED | OUTPATIENT
Start: 2022-04-17 | End: 2022-04-18

## 2022-04-17 RX ORDER — BENZOCAINE AND MENTHOL 5; 1 G/100ML; G/100ML
1 LIQUID ORAL ONCE
Refills: 0 | Status: DISCONTINUED | OUTPATIENT
Start: 2022-04-17 | End: 2022-04-17

## 2022-04-17 RX ORDER — OXYCODONE HYDROCHLORIDE 5 MG/1
2.5 TABLET ORAL EVERY 4 HOURS
Refills: 0 | Status: DISCONTINUED | OUTPATIENT
Start: 2022-04-17 | End: 2022-04-18

## 2022-04-17 RX ORDER — OXYCODONE HYDROCHLORIDE 5 MG/1
10 TABLET ORAL EVERY 4 HOURS
Refills: 0 | Status: DISCONTINUED | OUTPATIENT
Start: 2022-04-17 | End: 2022-04-17

## 2022-04-17 RX ORDER — HYDROMORPHONE HYDROCHLORIDE 2 MG/ML
0.5 INJECTION INTRAMUSCULAR; INTRAVENOUS; SUBCUTANEOUS
Refills: 0 | Status: DISCONTINUED | OUTPATIENT
Start: 2022-04-17 | End: 2022-04-17

## 2022-04-17 RX ADMIN — Medication 650 MILLIGRAM(S): at 23:32

## 2022-04-17 RX ADMIN — OXYCODONE HYDROCHLORIDE 5 MILLIGRAM(S): 5 TABLET ORAL at 13:35

## 2022-04-17 RX ADMIN — OXYCODONE HYDROCHLORIDE 5 MILLIGRAM(S): 5 TABLET ORAL at 21:23

## 2022-04-17 RX ADMIN — SODIUM CHLORIDE 500 MILLILITER(S): 9 INJECTION, SOLUTION INTRAVENOUS at 23:05

## 2022-04-17 RX ADMIN — OXYCODONE HYDROCHLORIDE 5 MILLIGRAM(S): 5 TABLET ORAL at 21:53

## 2022-04-17 RX ADMIN — OXYCODONE HYDROCHLORIDE 5 MILLIGRAM(S): 5 TABLET ORAL at 14:15

## 2022-04-17 RX ADMIN — HYDROMORPHONE HYDROCHLORIDE 0.5 MILLIGRAM(S): 2 INJECTION INTRAMUSCULAR; INTRAVENOUS; SUBCUTANEOUS at 06:30

## 2022-04-17 RX ADMIN — LIDOCAINE 15 MILLILITER(S): 4 CREAM TOPICAL at 17:03

## 2022-04-17 RX ADMIN — HYDROMORPHONE HYDROCHLORIDE 0.5 MILLIGRAM(S): 2 INJECTION INTRAMUSCULAR; INTRAVENOUS; SUBCUTANEOUS at 06:09

## 2022-04-17 NOTE — PROGRESS NOTE ADULT - ASSESSMENT
22F w/ hx of recent pregnancy (delivered March 2022) presenting with cholecystitis with 1 day of symptoms.     PLAN  - Laparoscopic cholecystectomy add-on   - NPO, IV fluids, abx    B Team / Acute Care Surgery  Pager [phone]     22F w/ hx of recent pregnancy (delivered March 2022) presenting with cholecystitis with 1 day of symptoms now s/p laparoscopic cholecystectomy   PLAN  - Diet: Regular  - Multimodal pain control   - oob/ambulation as tolerated   - DVT ppx     B Team / Acute Care Surgery  Pager [phone]

## 2022-04-17 NOTE — CHART NOTE - NSCHARTNOTEFT_GEN_A_CORE
Rapid Response PGY 2 Note  Patient is a 22y old  Female  s/p laparoscopic cholecystectomy  Rapid response team called because syncopal event    Patient was seen and examined at the bedside by the rapid response team. Had syncopal event after getting out of bed. No head strike. LOC requiring sternal rub. Per nurse patient fell into bed and did not hit head or extremities on the floor. Airway intact, vital signs stable on arrival, alert ane oriented x4. Finger stick glucose wnl.    Allergies    No Known Allergies    Intolerances        PAST MEDICAL & SURGICAL HISTORY:  No pertinent past medical history    No significant past surgical history        Vital Signs Last 24 Hrs  T(C): 36.8 (17 Apr 2022 17:39), Max: 36.8 (17 Apr 2022 01:36)  T(F): 98.2 (17 Apr 2022 17:39), Max: 98.2 (17 Apr 2022 01:36)  HR: 66 (17 Apr 2022 21:17) (60 - 80)  BP: 100/68 (17 Apr 2022 21:17) (94/53 - 127/81)  BP(mean): 81 (17 Apr 2022 07:45) (76 - 93)  RR: 17 (17 Apr 2022 17:39) (8 - 18)  SpO2: 100% (17 Apr 2022 17:39) (95% - 100%)          GENERAL: The patient is awake and alert in no apparent distress.   LUNGS: breathing comfortably  HEART: regular rate, bp pressure  ABDOMEN: soft, appropriately tender, nondistended  SKIN: No new rashes or lesions.    04-16 @ 07:01 - 04-17 @ 07:00  --------------------------------------------------------  IN: 625 mL / OUT: 1175 mL / NET: -550 mL    04-17 @ 07:01 - 04-17 @ 22:43  --------------------------------------------------------  IN: 480 mL / OUT: 400 mL / NET: 80 mL                              11.7   8.45  )-----------( 372      ( 17 Apr 2022 06:43 )             36.3     04-17    141  |  104  |  11  ----------------------------<  108<H>  4.0   |  25  |  0.89    Ca    9.0      17 Apr 2022 06:43  Phos  3.9     04-17  Mg     2.10     04-17    TPro  6.8  /  Alb  4.1  /  TBili  0.3  /  DBili  x   /  AST  73<H>  /  ALT  129<H>  /  AlkPhos  106  04-17         LIVER FUNCTIONS - ( 17 Apr 2022 06:43 )  Alb: 4.1 g/dL / Pro: 6.8 g/dL / ALK PHOS: 106 U/L / ALT: 129 U/L / AST: 73 U/L / GGT: x              PT/INR - ( 16 Apr 2022 14:11 )   PT: 12.4 sec;   INR: 1.07 ratio         PTT - ( 16 Apr 2022 14:11 )  PTT:28.8 sec    Vital Signs Last 24 Hrs*       Assessment- Rapid Response called for 22y year old Female with no pmh s/p laparoscopic cholecsytectomy, rapid response called for syncopal event requiring sternal rub for arousal    Plan-  - rapid response ended  - finger stick wnl  - airway, breathing, hemodynamics intact  - fu orthostatic judith signs, encourage po intake, ivf bolus LR  - fu labs  - re-evaluate by primary team    Bong Colon  PGY-2

## 2022-04-17 NOTE — PROGRESS NOTE ADULT - SUBJECTIVE AND OBJECTIVE BOX
SURGERY DAILY PROGRESS NOTE:     SUBJECTIVE/ROS: No acute events overnight. Patient seen and examined bedside on AM rounds.     OBJECTIVE:  Vital Signs Last 24 Hrs  T(C): 36.3 (17 Apr 2022 02:47), Max: 36.8 (16 Apr 2022 17:04)  T(F): 97.3 (17 Apr 2022 02:47), Max: 98.2 (16 Apr 2022 17:04)  HR: 66 (17 Apr 2022 02:47) (61 - 78)  BP: 94/53 (17 Apr 2022 02:47) (91/52 - 107/67)  BP(mean): --  RR: 17 (17 Apr 2022 02:47) (16 - 19)  SpO2: 100% (17 Apr 2022 02:47) (98% - 100%)    PHYSICAL EXAM:  Constitutional: NAD  Respiratory: non-labored breathing, patent airway  Gastrointestinal: abdomen soft, RUQ; tender to deep palpation  Extremities: warm  Neurological: intact                          11.8   10.01 )-----------( 375      ( 16 Apr 2022 03:56 )             37.0     04-16    140  |  100  |  15  ----------------------------<  110<H>  4.0   |  25  |  0.69    Ca    10.3      16 Apr 2022 03:56    TPro  7.7  /  Alb  4.6  /  TBili  <0.2  /  DBili  x   /  AST  44<H>  /  ALT  49<H>  /  AlkPhos  71  04-16   PT/INR - ( 16 Apr 2022 14:11 )   PT: 12.4 sec;   INR: 1.07 ratio         PTT - ( 16 Apr 2022 14:11 )  PTT:28.8 sec  I&O's Detail    16 Apr 2022 07:01  -  17 Apr 2022 03:10  --------------------------------------------------------  IN:    Lactated Ringers: 625 mL  Total IN: 625 mL    OUT:    Voided (mL): 1175 mL  Total OUT: 1175 mL    Total NET: -550 mL          IMAGING:               SURGERY DAILY PROGRESS NOTE:     SUBJECTIVE/ROS: No acute events overnight. Patient seen and examined bedside on AM rounds. States pain is controlled. Tolerating regular food, denies nausea or vomiting.      OBJECTIVE:  Vital Signs Last 24 Hrs  T(C): 36.3 (17 Apr 2022 02:47), Max: 36.8 (16 Apr 2022 17:04)  T(F): 97.3 (17 Apr 2022 02:47), Max: 98.2 (16 Apr 2022 17:04)  HR: 66 (17 Apr 2022 02:47) (61 - 78)  BP: 94/53 (17 Apr 2022 02:47) (91/52 - 107/67)  BP(mean): --  RR: 17 (17 Apr 2022 02:47) (16 - 19)  SpO2: 100% (17 Apr 2022 02:47) (98% - 100%)    PHYSICAL EXAM:  Constitutional: NAD  Respiratory: non-labored breathing, patent airway  Gastrointestinal: abdomen soft, RUQ; mild tenderness to deep palpation, incisions c/d/i   Extremities: warm  Neurological: intact                          11.8   10.01 )-----------( 375      ( 16 Apr 2022 03:56 )             37.0     04-16    140  |  100  |  15  ----------------------------<  110<H>  4.0   |  25  |  0.69    Ca    10.3      16 Apr 2022 03:56    TPro  7.7  /  Alb  4.6  /  TBili  <0.2  /  DBili  x   /  AST  44<H>  /  ALT  49<H>  /  AlkPhos  71  04-16   PT/INR - ( 16 Apr 2022 14:11 )   PT: 12.4 sec;   INR: 1.07 ratio         PTT - ( 16 Apr 2022 14:11 )  PTT:28.8 sec  I&O's Detail    16 Apr 2022 07:01  -  17 Apr 2022 03:10  --------------------------------------------------------  IN:    Lactated Ringers: 625 mL  Total IN: 625 mL    OUT:    Voided (mL): 1175 mL  Total OUT: 1175 mL    Total NET: -550 mL          IMAGING:

## 2022-04-18 ENCOUNTER — TRANSCRIPTION ENCOUNTER (OUTPATIENT)
Age: 22
End: 2022-04-18

## 2022-04-18 VITALS
HEART RATE: 66 BPM | OXYGEN SATURATION: 99 % | TEMPERATURE: 98 F | SYSTOLIC BLOOD PRESSURE: 106 MMHG | RESPIRATION RATE: 16 BRPM | DIASTOLIC BLOOD PRESSURE: 66 MMHG

## 2022-04-18 RX ORDER — ACETAMINOPHEN 500 MG
2 TABLET ORAL
Qty: 0 | Refills: 0 | DISCHARGE
Start: 2022-04-18

## 2022-04-18 RX ORDER — SENNA PLUS 8.6 MG/1
1 TABLET ORAL DAILY
Refills: 0 | Status: DISCONTINUED | OUTPATIENT
Start: 2022-04-18 | End: 2022-04-18

## 2022-04-18 RX ORDER — IBUPROFEN 200 MG
1 TABLET ORAL
Qty: 0 | Refills: 0 | DISCHARGE
Start: 2022-04-18

## 2022-04-18 RX ADMIN — SENNA PLUS 1 TABLET(S): 8.6 TABLET ORAL at 13:13

## 2022-04-18 RX ADMIN — Medication 400 MILLIGRAM(S): at 10:14

## 2022-04-18 RX ADMIN — Medication 400 MILLIGRAM(S): at 10:56

## 2022-04-18 RX ADMIN — Medication 650 MILLIGRAM(S): at 00:02

## 2022-04-18 RX ADMIN — Medication 650 MILLIGRAM(S): at 14:00

## 2022-04-18 RX ADMIN — Medication 400 MILLIGRAM(S): at 02:31

## 2022-04-18 RX ADMIN — Medication 650 MILLIGRAM(S): at 13:13

## 2022-04-18 RX ADMIN — Medication 100 MILLIGRAM(S): at 13:13

## 2022-04-18 RX ADMIN — Medication 650 MILLIGRAM(S): at 06:44

## 2022-04-18 RX ADMIN — Medication 400 MILLIGRAM(S): at 03:01

## 2022-04-18 NOTE — DISCHARGE NOTE PROVIDER - NSDCFUADDINST_GEN_ALL_CORE_FT
WOUND CARE:  Please keep incisions clean and dry. Please do not Scrub or rub incisions. Do not use lotion or powder on incisions.   BATHING: You may shower and/or sponge bathe. You may use warm soapy water in the shower and rinse, pat dry.  ACTIVITY: No heavy lifting or straining. Otherwise, you may return to your usual level of physical activity. If you are taking narcotic pain medication DO NOT drive a car, operate machinery or make important decisions.  DIET: Return to low fat diet.   NOTIFY YOUR SURGEON IF YOU HAVE: any bleeding that does not stop, any pus draining from your wound(s), any fever (over 100.4 F) persistent nausea/vomiting, or if your pain is not controlled on your discharge pain medications, unable to urinate.  FOLLOW UP:  1. Please follow up with your primary care physician in one week regarding your hospitalization, bring copies of your discharge paperwork.  2. Please follow up with your surgeon, Dr. Escamilla in 1 week.

## 2022-04-18 NOTE — DISCHARGE NOTE NURSING/CASE MANAGEMENT/SOCIAL WORK - NSDCPNINST_GEN_ALL_CORE
Please follow up with your surgeon within two weeks. No heavy lifting/straining. Maintain low-fat diet.

## 2022-04-18 NOTE — DISCHARGE NOTE PROVIDER - HOSPITAL COURSE
This patient is a 22F w/ no medical/surgical history who presented to [facility] on 4/16/22 with upper abdominal pain that started night prior. The pain had been constant and severe and she had vomited several times. Denied fevers, chills, constipation, diarrhea. She was recently pregnant (delivered in March 2022) and began having similar episodes during the pregnancy that resolved each time. She has had 5 or 6 episodes.  She takes no medications. She had a syncopal episode during her pregnancy and has recently seen a cardiologist for work up and was planning to get an echo next week.  In the ED she is afebrile, WBC 10, Cr 0.69, bilirubin 0.2, lipase 64, US shows cholelithiasis, CBD is 3mm.  Patient admitted to Acute Care Surgery service for acute cholecystitis. Patient taken to OR on 4/17 by Dr. Escamilla and underwent laparoscopic cholecystectomy. Patient tolerated operation well and there were no post-operative complications identified. Patient remained hemodynamically stable in the PACU and transferred to the surgical floor. In the evening patient had surgical rapid response called for syncopal event.  Had syncopal event after getting out of bed. No head strike. LOC requiring sternal rub. Per nurse patient fell into bed and did not hit head or extremities on the floor. Airway intact, vital signs stable on arrival, alert ane oriented x4. Finger stick glucose wnl. Given IVF bolus.   Diet was restarted and advanced as tolerated. Pain control was transitioned from IV to PO pain meds. At this time, patient is currently ambulating, voiding, tolerating a regular diet. Pain well controlled on PO pain meds. Patient has been deemed stable for discharge home with follow up as an outpatient. This patient is a 22F w/ no medical/surgical history who presented to [facility] on 4/16/22 with upper abdominal pain that started night prior. The pain had been constant and severe and she had vomited several times. Denied fevers, chills, constipation, diarrhea. She was recently pregnant (delivered in March 2022) and began having similar episodes during the pregnancy that resolved each time. She has had 5 or 6 episodes.  She takes no medications. She had a syncopal episode during her pregnancy and has recently seen a cardiologist for work up and was planning to get an echo next week.  In the ED she is afebrile, WBC 10, Cr 0.69, bilirubin 0.2, lipase 64, US shows cholelithiasis, CBD is 3mm.    Patient admitted to Acute Care Surgery service for acute cholecystitis. Patient taken to OR on 4/17 by Dr. Escamilla and underwent laparoscopic cholecystectomy. Patient tolerated operation well and there were no post-operative complications identified. Patient remained hemodynamically stable in the PACU and transferred to the surgical floor. In the evening patient had surgical rapid response called for syncopal event after getting out of bed. No head strike. LOC requiring sternal rub. Per nurse patient fell into bed and did not hit head or extremities on the floor. Airway intact, vital signs stable on arrival, alert ane oriented x4. Finger stick glucose wnl. Given IVF bolus.     Diet was restarted and advanced as tolerated. Pain control was transitioned from IV to PO pain meds. At this time, patient is currently ambulating, voiding, tolerating a regular diet. Pain well controlled on PO pain meds. Patient has been deemed stable for discharge home with follow up as an outpatient.

## 2022-04-18 NOTE — DISCHARGE NOTE PROVIDER - CARE PROVIDER_API CALL
Jono Escamilla)  Surgery; Surgical Critical Care  [address], [address]  [place] [address]  Phone: [phone]  Fax: [phone]  Follow Up Time: 2 weeks

## 2022-04-18 NOTE — PROGRESS NOTE ADULT - ASSESSMENT
22F w/ hx of recent pregnancy (delivered March 2022) presenting with cholecystitis  now s/p laparoscopic cholecystectomy on 4/17.  Patient currently hemodynamically stable.    PLAN  - Multimodal pain control   - Monitor vitals  - Orthostatic BP?  - Regular diet  - DVT ppx : lovenox  - Ambulate as tolerated  - Dispo: potential discharge home later today       B Team / Acute Care Surgery  Pager [phone]   22F w/ hx of recent pregnancy (delivered March 2022) presenting with cholecystitis  now s/p laparoscopic cholecystectomy on 4/17.  Patient currently hemodynamically stable.    PLAN  - Multimodal pain control   - Monitor vitals  - Regular diet  - DVT ppx : lovenox  - Ambulate as tolerated  - Dispo: potential discharge home later today       B Team / Acute Care Surgery  Pager [phone]

## 2022-04-18 NOTE — DISCHARGE NOTE PROVIDER - NSDCMRMEDTOKEN_GEN_ALL_CORE_FT
acetaminophen 325 mg oral tablet: 2 tab(s) orally every 6 hours  ibuprofen 400 mg oral tablet: 1 tab(s) orally every 6 hours

## 2022-04-18 NOTE — DISCHARGE NOTE NURSING/CASE MANAGEMENT/SOCIAL WORK - NSDCPEFALRISK_GEN_ALL_CORE
For information on Fall & Injury Prevention, visit: [email or web address] OR  [email or web address] OR  https://www.cdc.gov/steadi/patient.html

## 2022-04-18 NOTE — DISCHARGE NOTE PROVIDER - NSDCCPCAREPLAN_GEN_ALL_CORE_FT
PRINCIPAL DISCHARGE DIAGNOSIS  Diagnosis: Symptomatic cholelithiasis  Assessment and Plan of Treatment: WOUND CARE:  Please keep incisions clean and dry. Please do not Scrub or rub incisions. Do not use lotion or powder on incisions.   BATHING: You may shower and/or sponge bathe. You may use warm soapy water in the shower and rinse, pat dry.  ACTIVITY: No heavy lifting or straining. Otherwise, you may return to your usual level of physical activity. If you are taking narcotic pain medication DO NOT drive a car, operate machinery or make important decisions.  DIET: Return to your usual diet.  NOTIFY YOUR SURGEON IF YOU HAVE: any bleeding that does not stop, any pus draining from your wound(s), any fever (over 100.4 F) persistent nausea/vomiting, or if your pain is not controlled on your discharge pain medications, unable to urinate.  FOLLOW UP:  1. Please follow up with your primary care physician in one week regarding your hospitalization, bring copies of your discharge paperwork.  2. Please follow up with your surgeon, Dr. Escamilla. Please call [phone] to make an appointment.       PRINCIPAL DISCHARGE DIAGNOSIS  Diagnosis: Symptomatic cholelithiasis  Assessment and Plan of Treatment:

## 2022-04-18 NOTE — DISCHARGE NOTE NURSING/CASE MANAGEMENT/SOCIAL WORK - PATIENT PORTAL LINK FT
You can access the FollowMyHealth Patient Portal offered by [facility] by registering at the following website: [email or web address]. By joining [facility]’s FollowMyHealth portal, you will also be able to view your health information using other applications (apps) compatible with our system.

## 2022-04-18 NOTE — DISCHARGE NOTE PROVIDER - NSDCFUSCHEDAPPT_GEN_ALL_CORE_FT
EMA, MST TAHMINAAKTER ; 04/22/2022 ; [facility] [address]  HERACLIO SHAY ; 04/27/2022 ; [facility] [address]  EMA, MST TAHMINAAKTER ; 05/03/2022 ; [facility] [address]

## 2022-04-18 NOTE — PROGRESS NOTE ADULT - SUBJECTIVE AND OBJECTIVE BOX
SURGERY PROGRESS NOTE      SUBJECTIVE/ROS:   Rapid Response called overnight for near syncope.  Patient felt dizzy upon standing and fell back onto her bed.   No LOC, no HS, no trauma  Her vitals were stable.  Patient resuscitated with IV fluid bolus  Labs were within normal limits      This morning ....         OBJECTIVE:    Vital Signs Last 24 Hrs  T(C): 36.7 (18 Apr 2022 02:03), Max: 36.8 (17 Apr 2022 17:39)  T(F): 98 (18 Apr 2022 02:03), Max: 98.2 (17 Apr 2022 17:39)  HR: 58 (18 Apr 2022 02:03) (58 - 80)  BP: 99/59 (18 Apr 2022 02:03) (99/59 - 127/81)  BP(mean): 81 (17 Apr 2022 07:45) (76 - 93)  RR: 18 (18 Apr 2022 02:03) (8 - 18)  SpO2: 97% (18 Apr 2022 02:03) (95% - 100%)    PHYSICAL EXAM:  Constitutional:  NAD  Neuro: AOx3  Respiratory: Non-labored breathing  Gastrointestinal: ...  Extremities:  FROM      I&Os:  I&O's Detail    16 Apr 2022 07:01  -  17 Apr 2022 07:00  --------------------------------------------------------  IN:    Lactated Ringers: 625 mL  Total IN: 625 mL    OUT:    Voided (mL): 1175 mL  Total OUT: 1175 mL    Total NET: -550 mL      17 Apr 2022 07:01  -  18 Apr 2022 03:27  --------------------------------------------------------  IN:    Lactated Ringers: 500 mL    Oral Fluid: 480 mL  Total IN: 980 mL    OUT:    IV PiggyBack: 0 mL    Voided (mL): 800 mL  Total OUT: 800 mL    Total NET: 180 mL          LABS:                        10.9   10.22 )-----------( 387      ( 17 Apr 2022 23:20 )             33.0     04-17    142  |  104  |  11  ----------------------------<  142<H>  3.7   |  23  |  0.76    Ca    9.2      17 Apr 2022 23:20  Phos  4.5     04-17  Mg     2.10     04-17    TPro  6.8  /  Alb  4.1  /  TBili  0.3  /  DBili  x   /  AST  73<H>  /  ALT  129<H>  /  AlkPhos  106  04-17    PT/INR - ( 16 Apr 2022 14:11 )   PT: 12.4 sec;   INR: 1.07 ratio         PTT - ( 16 Apr 2022 14:11 )  PTT:28.8 sec         SURGERY PROGRESS NOTE      SUBJECTIVE/ROS:   Rapid Response called overnight for near syncope.  Patient felt dizzy upon standing and fell back onto her bed.   No LOC, no HS, no trauma  Her vitals were stable.  Patient resuscitated with IV fluid bolus  Labs were within normal limits      This morning pain controlled. Denies dizziness, or N/V. Tolerating diet.          OBJECTIVE:    Vital Signs Last 24 Hrs  T(C): 36.7 (18 Apr 2022 02:03), Max: 36.8 (17 Apr 2022 17:39)  T(F): 98 (18 Apr 2022 02:03), Max: 98.2 (17 Apr 2022 17:39)  HR: 58 (18 Apr 2022 02:03) (58 - 80)  BP: 99/59 (18 Apr 2022 02:03) (99/59 - 127/81)  BP(mean): 81 (17 Apr 2022 07:45) (76 - 93)  RR: 18 (18 Apr 2022 02:03) (8 - 18)  SpO2: 97% (18 Apr 2022 02:03) (95% - 100%)    PHYSICAL EXAM:  Constitutional:  NAD  Neuro: AOx3  Respiratory: Non-labored breathing  Gastrointestinal: incision c/d/i. soft nontender  Extremities:  FROM      I&Os:  I&O's Detail    16 Apr 2022 07:01  -  17 Apr 2022 07:00  --------------------------------------------------------  IN:    Lactated Ringers: 625 mL  Total IN: 625 mL    OUT:    Voided (mL): 1175 mL  Total OUT: 1175 mL    Total NET: -550 mL      17 Apr 2022 07:01  -  18 Apr 2022 03:27  --------------------------------------------------------  IN:    Lactated Ringers: 500 mL    Oral Fluid: 480 mL  Total IN: 980 mL    OUT:    IV PiggyBack: 0 mL    Voided (mL): 800 mL  Total OUT: 800 mL    Total NET: 180 mL          LABS:                        10.9   10.22 )-----------( 387      ( 17 Apr 2022 23:20 )             33.0     04-17    142  |  104  |  11  ----------------------------<  142<H>  3.7   |  23  |  0.76    Ca    9.2      17 Apr 2022 23:20  Phos  4.5     04-17  Mg     2.10     04-17    TPro  6.8  /  Alb  4.1  /  TBili  0.3  /  DBili  x   /  AST  73<H>  /  ALT  129<H>  /  AlkPhos  106  04-17    PT/INR - ( 16 Apr 2022 14:11 )   PT: 12.4 sec;   INR: 1.07 ratio         PTT - ( 16 Apr 2022 14:11 )  PTT:28.8 sec

## 2022-04-18 NOTE — PROGRESS NOTE ADULT - SUBJECTIVE AND OBJECTIVE BOX
ANESTHESIA POSTOP CHECK    22y Female POSTOP DAY 1    Vital Signs Last 24 Hrs  T(C): 36.4 (18 Apr 2022 06:15), Max: 36.8 (17 Apr 2022 17:39)  T(F): 97.5 (18 Apr 2022 06:15), Max: 98.2 (17 Apr 2022 17:39)  HR: 58 (18 Apr 2022 06:15) (58 - 76)  BP: 97/58 (18 Apr 2022 06:15) (97/58 - 111/70)    RR: 18 (18 Apr 2022 06:15) (16 - 18)  SpO2: 98% (18 Apr 2022 06:15) (97% - 100%)        [ x] NO APPARENT ANESTHESIA COMPLICATIONS

## 2022-04-22 ENCOUNTER — APPOINTMENT (OUTPATIENT)
Dept: CV DIAGNOSITCS | Facility: HOSPITAL | Age: 22
End: 2022-04-22

## 2022-04-22 ENCOUNTER — OUTPATIENT (OUTPATIENT)
Dept: OUTPATIENT SERVICES | Facility: HOSPITAL | Age: 22
LOS: 1 days | End: 2022-04-22

## 2022-04-22 DIAGNOSIS — R55 SYNCOPE AND COLLAPSE: ICD-10-CM

## 2022-04-26 LAB — SURGICAL PATHOLOGY STUDY: SIGNIFICANT CHANGE UP

## 2022-04-27 ENCOUNTER — APPOINTMENT (OUTPATIENT)
Dept: CARDIOLOGY | Facility: HOSPITAL | Age: 22
End: 2022-04-27

## 2022-05-03 ENCOUNTER — OUTPATIENT (OUTPATIENT)
Dept: OUTPATIENT SERVICES | Facility: HOSPITAL | Age: 22
LOS: 1 days | End: 2022-05-03

## 2022-05-03 ENCOUNTER — APPOINTMENT (OUTPATIENT)
Dept: OBGYN | Facility: HOSPITAL | Age: 22
End: 2022-05-03

## 2022-05-03 VITALS
BODY MASS INDEX: 28.51 KG/M2 | SYSTOLIC BLOOD PRESSURE: 116 MMHG | TEMPERATURE: 97.9 F | DIASTOLIC BLOOD PRESSURE: 63 MMHG | HEIGHT: 61 IN | HEART RATE: 77 BPM | WEIGHT: 151 LBS

## 2022-05-03 DIAGNOSIS — N64.0 FISSURE AND FISTULA OF NIPPLE: ICD-10-CM

## 2022-05-03 DIAGNOSIS — Z34.92 ENCOUNTER FOR SUPERVISION OF NORMAL PREGNANCY, UNSPECIFIED, SECOND TRIMESTER: ICD-10-CM

## 2022-05-03 RX ORDER — LANCETS
EACH MISCELLANEOUS
Qty: 2 | Refills: 4 | Status: DISCONTINUED | COMMUNITY
Start: 2022-01-24 | End: 2022-05-03

## 2022-05-03 RX ORDER — MELATONIN 3 MG
25 MCG TABLET ORAL
Qty: 30 | Refills: 5 | Status: DISCONTINUED | COMMUNITY
Start: 2021-10-29 | End: 2022-05-03

## 2022-05-03 RX ORDER — INSULIN GLARGINE 100 [IU]/ML
100 INJECTION, SOLUTION SUBCUTANEOUS AT BEDTIME
Qty: 1 | Refills: 0 | Status: DISCONTINUED | COMMUNITY
Start: 2022-02-22 | End: 2022-05-03

## 2022-05-03 RX ORDER — METFORMIN HYDROCHLORIDE 500 MG/1
500 TABLET, COATED ORAL DAILY
Qty: 60 | Refills: 0 | Status: DISCONTINUED | COMMUNITY
Start: 2022-02-08 | End: 2022-05-03

## 2022-05-03 RX ORDER — ISOPROPYL ALCOHOL 0.7 ML/ML
SWAB TOPICAL
Qty: 1 | Refills: 2 | Status: DISCONTINUED | COMMUNITY
Start: 2022-02-22 | End: 2022-05-03

## 2022-05-03 RX ORDER — METFORMIN HYDROCHLORIDE 1000 MG/1
1000 TABLET, COATED ORAL
Qty: 60 | Refills: 1 | Status: DISCONTINUED | COMMUNITY
Start: 2022-02-22 | End: 2022-05-03

## 2022-05-03 RX ORDER — ISOPROPYL ALCOHOL 70 ML/100ML
SWAB TOPICAL
Qty: 1 | Refills: 1 | Status: DISCONTINUED | COMMUNITY
Start: 2022-02-08 | End: 2022-05-03

## 2022-05-03 RX ORDER — PEN NEEDLE, DIABETIC 29 G X1/2"
32G X 4 MM NEEDLE, DISPOSABLE MISCELLANEOUS
Qty: 1 | Refills: 5 | Status: DISCONTINUED | COMMUNITY
Start: 2022-02-22 | End: 2022-05-03

## 2022-05-04 DIAGNOSIS — O92.79 OTHER DISORDERS OF LACTATION: ICD-10-CM

## 2022-05-06 ENCOUNTER — APPOINTMENT (OUTPATIENT)
Dept: TRAUMA SURGERY | Facility: HOSPITAL | Age: 22
End: 2022-05-06

## 2022-05-06 ENCOUNTER — APPOINTMENT (OUTPATIENT)
Dept: OBGYN | Facility: HOSPITAL | Age: 22
End: 2022-05-06

## 2022-05-06 ENCOUNTER — APPOINTMENT (OUTPATIENT)
Dept: TRAUMA SURGERY | Facility: HOSPITAL | Age: 22
End: 2022-05-06
Payer: MEDICAID

## 2022-05-06 VITALS
TEMPERATURE: 98.1 F | HEART RATE: 87 BPM | HEIGHT: 61 IN | DIASTOLIC BLOOD PRESSURE: 65 MMHG | SYSTOLIC BLOOD PRESSURE: 114 MMHG | BODY MASS INDEX: 28.45 KG/M2 | WEIGHT: 150.7 LBS

## 2022-05-06 PROCEDURE — 99024 POSTOP FOLLOW-UP VISIT: CPT

## 2022-05-06 NOTE — PLAN
[FreeTextEntry1] : Mrs. De Jesus underwent a laparoscopic cholecystectomy and has recovered well. The incisions are well healed and good PO nutrition is being tolerated. The pathology revealed no evidence of malignancy. \par \par Resume normal activity with gradual resumption of strenuous activity\par Avoid fatty foods. Low fat diet should diarrhea develop\par Follow up with me in clinic if yellowing of skin develops or fever/chills and RUQ pain develops. \par \par Jono Escamilla MD\par Acute Care Surgery\par

## 2022-05-06 NOTE — PHYSICAL EXAM
[de-identified] : Well ,comfortable. [de-identified] : Soft, nontender, nondistended. The incisions are well healed without signs of erythema, cellulitis or drainage. No palpable hernia formation.\par

## 2022-05-06 NOTE — HISTORY OF PRESENT ILLNESS
[de-identified] : Mrs. De Jesus has recovered well and denies fevers, chills, abdominal pain, SOB/dyspnea or weakness/fatigue. Tolerating adequate PO intake and GI activity (bowel movements) has recovered without constipation or diarrhea. No complaints regarding incisions and dressings.\par

## 2022-05-10 ENCOUNTER — OUTPATIENT (OUTPATIENT)
Dept: OUTPATIENT SERVICES | Facility: HOSPITAL | Age: 22
LOS: 1 days | End: 2022-05-10

## 2022-05-10 ENCOUNTER — RESULT REVIEW (OUTPATIENT)
Age: 22
End: 2022-05-10

## 2022-05-10 ENCOUNTER — APPOINTMENT (OUTPATIENT)
Dept: OBGYN | Facility: HOSPITAL | Age: 22
End: 2022-05-10

## 2022-05-10 LAB
GESTATIONAL GLUCOSE 2 HR (ADA): 124 MG/DL — SIGNIFICANT CHANGE UP (ref 70–152)
GTT 2H PNL SERPL: 94 MG/DL — HIGH (ref 70–91)

## 2022-05-11 DIAGNOSIS — Z13.1 ENCOUNTER FOR SCREENING FOR DIABETES MELLITUS: ICD-10-CM

## 2022-05-18 NOTE — OB RN DELIVERY SUMMARY - AMNIOTIC FLUID COLOR, LABOR
clear Humira Counseling:  I discussed with the patient the risks of adalimumab including but not limited to myelosuppression, immunosuppression, autoimmune hepatitis, demyelinating diseases, lymphoma, and serious infections.  The patient understands that monitoring is required including a PPD at baseline and must alert us or the primary physician if symptoms of infection or other concerning signs are noted.

## 2022-06-24 ENCOUNTER — APPOINTMENT (OUTPATIENT)
Dept: CV DIAGNOSITCS | Facility: HOSPITAL | Age: 22
End: 2022-06-24
Payer: MEDICAID

## 2022-06-24 ENCOUNTER — OUTPATIENT (OUTPATIENT)
Dept: OUTPATIENT SERVICES | Facility: HOSPITAL | Age: 22
LOS: 1 days | End: 2022-06-24
Payer: MEDICAID

## 2022-06-24 DIAGNOSIS — R55 SYNCOPE AND COLLAPSE: ICD-10-CM

## 2022-06-24 PROCEDURE — 93306 TTE W/DOPPLER COMPLETE: CPT | Mod: 26

## 2022-06-24 PROCEDURE — 93306 TTE W/DOPPLER COMPLETE: CPT

## 2022-06-28 ENCOUNTER — OUTPATIENT (OUTPATIENT)
Dept: OUTPATIENT SERVICES | Facility: HOSPITAL | Age: 22
LOS: 1 days | End: 2022-06-28
Payer: MEDICAID

## 2022-06-28 ENCOUNTER — APPOINTMENT (OUTPATIENT)
Dept: CARDIOLOGY | Facility: HOSPITAL | Age: 22
End: 2022-06-28

## 2022-06-28 VITALS
HEART RATE: 90 BPM | DIASTOLIC BLOOD PRESSURE: 74 MMHG | OXYGEN SATURATION: 96 % | SYSTOLIC BLOOD PRESSURE: 110 MMHG | HEIGHT: 61 IN

## 2022-06-28 DIAGNOSIS — I25.10 ATHEROSCLEROTIC HEART DISEASE OF NATIVE CORONARY ARTERY WITHOUT ANGINA PECTORIS: ICD-10-CM

## 2022-06-28 PROCEDURE — G0463: CPT

## 2022-06-29 DIAGNOSIS — R00.2 PALPITATIONS: ICD-10-CM

## 2022-06-29 NOTE — REASON FOR VISIT
[FreeTextEntry1] : 23 YO F no pmh from [facility] in the  for 1 year,  s/p baby boy Ehan via VAVD on 3/22/22 EBL 400cc. 2 hours after delivery patient stood up to go to the bathroom felt lightheadedness, with assitance of nurse walked to the bathroom sat to urinate. Shortly after standing felt dizzy again. Took 15 minutes to return to baseline. No history of heart disease. Has had episodes of syncope during pregnancy.. Otherwise, pregnancy has been uncomplicated. Continues to have chest pain when laying flat and worse with deep breath. Relieved with exercise. no palpitations. No leg swelling. \par TTE on 2022 with no valvular disease, normal LV systolic funciton and diastolic function.\par \par FH: no family medical problems.\par SH: no smokers, no drugs, or alcohol, unemployed\par Meds: no meds or oral supplements. Tylenol PRN for pain.

## 2022-06-29 NOTE — ASSESSMENT
[FreeTextEntry1] : 23 yo F with hx of vasovagal syncope in March presents for follow up and echo results. No further episodes of syncope/light headedness/ dizziness. TTE with no significant valvular pathology and normal LV/RV function.\par patient will continue to follow up with her PCP as needed

## 2022-07-08 NOTE — HISTORY OF PRESENT ILLNESS
[Postpartum Follow Up] : postpartum follow up [Complications:___] : complications include: [unfilled] [Last Pap Date: ___] : Last Pap Date: [unfilled] [Delivery Date: ___] : on [unfilled] [Vacuum Assist] : delivered by vaginal delivery using vaccum assist [Rhogam] : Rhogam administered [BF with Difficulty] : nursing with difficulty [Discharge HCT: ___] : hematocrit level was [unfilled] [Discharge HGB: ___] : hemoglobin level was [unfilled] [Resumed Menses] : has resumed her menses [Intended Contraception] : Intended Contraception: [Back Pain] : back pain [Breast Pain] : breast pain [BreastFeeding Problems] : breastfeeding problems [S/Sx PP Depression] : signs/symptoms of postpartum depression [Back to Normal] : is back to normal in size [Moderate] : moderate vaginal bleeding [Normal] : the vagina was normal [Healing Well] : is healing well [Examination Of The Breasts] : breasts are normal [Breast Nipple Reddened And Excoriated Right] : nipple inflammation noted [Breast Nipple Reddened And Excoriated Left] : nipple inflammation noted [Doing Well] : is doing well [No Sign of Infection] : is showing no signs of infection [None] : None [Condoms] : condoms [Rubella Vaccine] : Rubella vaccine was not administered [Pertussis Vaccine] : Pertussis vaccine was not administered [BTL] : no tubal ligation [Resumed Marcy] : has not resumed intercourse [Abdominal Pain] : no abdominal pain [Chest Pain] : no chest pain [Cracked Nipples] : no cracked nipples [Episiotomy Site Pain] : no episiotomy site pain [Heavy Bleeding] : no heavy bleeding [Incisional Drainage] : no incisional drainage [Irregular Bleeding] : no irregular bleeding [Leg Pain] : no leg pain [Shortness of Breath] : no shortness of breath [Suicidal Ideation] : no suicidal ideation [Fatigue] : no fatigue [Dysuria] : no dysuria [Fever] : no fever [Hematoma] : no vaginal hematoma [Abscess Formation] : no vaginal abscess [Infected] : did not appear infected [Dehiscence] : was not dehisced [Cervix Sample Taken] : cervical sample not taken for a Pap smear [FreeTextEntry8] : here for pp exam. Menses started 3 days ago. Breast and bottle feeding since she needed a emergent echolycystectomy on 4/17/2022. Not taking PN vits, Vitamin D or iron [FreeTextEntry9] : , GDMA2, insulin, vit d deficiency, AB negative  [de-identified] : six weeks post partum  [de-identified] : nipples are sore, was  from the baby when she had her surgery [de-identified] : Breasts no masses, nipples flat erectile and not excoriated,  V/v healed, moderate menstrual flow, vagina intact, cx posterior with no CMT. Uterus and adnexa normal and non tender. Back tenderness at epidural site. Laparoscopy incisions healed,abdomen non tender.  [de-identified] : normal post partum exam, post op cholecystectomy healing. GDM A2, needs FBS and 2 hr pp flat nipples and breastfeeding difficulty after separation during post op period.  [de-identified] : lanolin nipple cream, counseling on latch on techniques. Asked to bring the baby when she comes for the GTT for on one one assistance with latch on. Patient desires to use condoms for birth control. Discussed DMPA as an option she may consider. Pap 2024

## 2022-10-16 ENCOUNTER — EMERGENCY (EMERGENCY)
Facility: HOSPITAL | Age: 22
LOS: 1 days | Discharge: HOME | End: 2022-10-16
Attending: EMERGENCY MEDICINE | Admitting: EMERGENCY MEDICINE

## 2022-10-16 VITALS
RESPIRATION RATE: 16 BRPM | HEART RATE: 80 BPM | TEMPERATURE: 98 F | OXYGEN SATURATION: 100 % | DIASTOLIC BLOOD PRESSURE: 70 MMHG | SYSTOLIC BLOOD PRESSURE: 110 MMHG

## 2022-10-16 VITALS
OXYGEN SATURATION: 98 % | RESPIRATION RATE: 18 BRPM | TEMPERATURE: 98 F | DIASTOLIC BLOOD PRESSURE: 65 MMHG | SYSTOLIC BLOOD PRESSURE: 107 MMHG | HEIGHT: 62 IN | HEART RATE: 85 BPM

## 2022-10-16 LAB
ALBUMIN SERPL ELPH-MCNC: 4.5 G/DL — SIGNIFICANT CHANGE UP (ref 3.3–5)
ALP SERPL-CCNC: 57 U/L — SIGNIFICANT CHANGE UP (ref 40–120)
ALT FLD-CCNC: 13 U/L — SIGNIFICANT CHANGE UP (ref 4–33)
ANION GAP SERPL CALC-SCNC: 11 MMOL/L — SIGNIFICANT CHANGE UP (ref 7–14)
APPEARANCE UR: CLEAR — SIGNIFICANT CHANGE UP
AST SERPL-CCNC: 13 U/L — SIGNIFICANT CHANGE UP (ref 4–32)
BACTERIA # UR AUTO: NEGATIVE — SIGNIFICANT CHANGE UP
BASOPHILS # BLD AUTO: 0.05 K/UL — SIGNIFICANT CHANGE UP (ref 0–0.2)
BASOPHILS NFR BLD AUTO: 0.6 % — SIGNIFICANT CHANGE UP (ref 0–2)
BILIRUB SERPL-MCNC: 0.2 MG/DL — SIGNIFICANT CHANGE UP (ref 0.2–1.2)
BILIRUB UR-MCNC: NEGATIVE — SIGNIFICANT CHANGE UP
BLD GP AB SCN SERPL QL: NEGATIVE — SIGNIFICANT CHANGE UP
BUN SERPL-MCNC: 8 MG/DL — SIGNIFICANT CHANGE UP (ref 7–23)
CALCIUM SERPL-MCNC: 9.5 MG/DL — SIGNIFICANT CHANGE UP (ref 8.4–10.5)
CHLORIDE SERPL-SCNC: 102 MMOL/L — SIGNIFICANT CHANGE UP (ref 98–107)
CO2 SERPL-SCNC: 23 MMOL/L — SIGNIFICANT CHANGE UP (ref 22–31)
COLOR SPEC: SIGNIFICANT CHANGE UP
CREAT SERPL-MCNC: 0.59 MG/DL — SIGNIFICANT CHANGE UP (ref 0.5–1.3)
DIFF PNL FLD: ABNORMAL
EGFR: 130 ML/MIN/1.73M2 — SIGNIFICANT CHANGE UP
EOSINOPHIL # BLD AUTO: 0.12 K/UL — SIGNIFICANT CHANGE UP (ref 0–0.5)
EOSINOPHIL NFR BLD AUTO: 1.4 % — SIGNIFICANT CHANGE UP (ref 0–6)
EPI CELLS # UR: 3 /HPF — SIGNIFICANT CHANGE UP (ref 0–5)
GLUCOSE SERPL-MCNC: 86 MG/DL — SIGNIFICANT CHANGE UP (ref 70–99)
GLUCOSE UR QL: NEGATIVE — SIGNIFICANT CHANGE UP
HCG SERPL-ACNC: SIGNIFICANT CHANGE UP MIU/ML
HCT VFR BLD CALC: 35.2 % — SIGNIFICANT CHANGE UP (ref 34.5–45)
HGB BLD-MCNC: 11.3 G/DL — LOW (ref 11.5–15.5)
HYALINE CASTS # UR AUTO: 1 /LPF — SIGNIFICANT CHANGE UP (ref 0–7)
IANC: 5.8 K/UL — SIGNIFICANT CHANGE UP (ref 1.8–7.4)
IMM GRANULOCYTES NFR BLD AUTO: 0.4 % — SIGNIFICANT CHANGE UP (ref 0–0.9)
KETONES UR-MCNC: NEGATIVE — SIGNIFICANT CHANGE UP
LEUKOCYTE ESTERASE UR-ACNC: ABNORMAL
LYMPHOCYTES # BLD AUTO: 1.82 K/UL — SIGNIFICANT CHANGE UP (ref 1–3.3)
LYMPHOCYTES # BLD AUTO: 21.8 % — SIGNIFICANT CHANGE UP (ref 13–44)
MCHC RBC-ENTMCNC: 27 PG — SIGNIFICANT CHANGE UP (ref 27–34)
MCHC RBC-ENTMCNC: 32.1 GM/DL — SIGNIFICANT CHANGE UP (ref 32–36)
MCV RBC AUTO: 84.2 FL — SIGNIFICANT CHANGE UP (ref 80–100)
MONOCYTES # BLD AUTO: 0.51 K/UL — SIGNIFICANT CHANGE UP (ref 0–0.9)
MONOCYTES NFR BLD AUTO: 6.1 % — SIGNIFICANT CHANGE UP (ref 2–14)
NEUTROPHILS # BLD AUTO: 5.8 K/UL — SIGNIFICANT CHANGE UP (ref 1.8–7.4)
NEUTROPHILS NFR BLD AUTO: 69.7 % — SIGNIFICANT CHANGE UP (ref 43–77)
NITRITE UR-MCNC: NEGATIVE — SIGNIFICANT CHANGE UP
NRBC # BLD: 0 /100 WBCS — SIGNIFICANT CHANGE UP (ref 0–0)
NRBC # FLD: 0 K/UL — SIGNIFICANT CHANGE UP (ref 0–0)
PH UR: 6.5 — SIGNIFICANT CHANGE UP (ref 5–8)
PLATELET # BLD AUTO: 356 K/UL — SIGNIFICANT CHANGE UP (ref 150–400)
POTASSIUM SERPL-MCNC: 4.1 MMOL/L — SIGNIFICANT CHANGE UP (ref 3.5–5.3)
POTASSIUM SERPL-SCNC: 4.1 MMOL/L — SIGNIFICANT CHANGE UP (ref 3.5–5.3)
PROT SERPL-MCNC: 7.5 G/DL — SIGNIFICANT CHANGE UP (ref 6–8.3)
PROT UR-MCNC: ABNORMAL
RBC # BLD: 4.18 M/UL — SIGNIFICANT CHANGE UP (ref 3.8–5.2)
RBC # FLD: 12.8 % — SIGNIFICANT CHANGE UP (ref 10.3–14.5)
RBC CASTS # UR COMP ASSIST: 1 /HPF — SIGNIFICANT CHANGE UP (ref 0–4)
RH IG SCN BLD-IMP: NEGATIVE — SIGNIFICANT CHANGE UP
SODIUM SERPL-SCNC: 136 MMOL/L — SIGNIFICANT CHANGE UP (ref 135–145)
SP GR SPEC: 1.01 — SIGNIFICANT CHANGE UP (ref 1.01–1.05)
UROBILINOGEN FLD QL: SIGNIFICANT CHANGE UP
WBC # BLD: 8.33 K/UL — SIGNIFICANT CHANGE UP (ref 3.8–10.5)
WBC # FLD AUTO: 8.33 K/UL — SIGNIFICANT CHANGE UP (ref 3.8–10.5)
WBC UR QL: SIGNIFICANT CHANGE UP /HPF (ref 0–5)

## 2022-10-16 PROCEDURE — 99285 EMERGENCY DEPT VISIT HI MDM: CPT

## 2022-10-16 PROCEDURE — 76830 TRANSVAGINAL US NON-OB: CPT | Mod: 26

## 2022-10-16 NOTE — ED PROVIDER NOTE - PATIENT PORTAL LINK FT
You can access the FollowMyHealth Patient Portal offered by [facility] by registering at the following website: [email or web address]. By joining [employer]’s FollowMyHealth portal, you will also be able to view your health information using other applications (apps) compatible with our system.

## 2022-10-16 NOTE — ED ADULT TRIAGE NOTE - CHIEF COMPLAINT QUOTE
p/t with positive home pregnancy test, c/o of rt lower abd pain and vag bleed since this am, no confirmed IUP

## 2022-10-16 NOTE — ED ADULT NURSE NOTE - OBJECTIVE STATEMENT
Pt presents to intake rm 13, aox4, amb ~8 weeks pregnant, +pregnancy test at home, presenting with lower abd pain and vaginal bleeding that started this morning. Denies any heavy flow or passing blood clots. 20g IV established on left ac, labs drawn and sent. Pending US

## 2022-10-16 NOTE — ED PROVIDER NOTE - NSFOLLOWUPINSTRUCTIONS_ED_ALL_ED_FT
Please follow up with an OB/GYN for your pregnancy.     If you experience heavy bleeding or anything else of concern, please return to the emergency department as needed.

## 2022-10-16 NOTE — ED PROVIDER NOTE - OBJECTIVE STATEMENT
22F  LMP 8/15 presents with 3 days of pelvic pain and intermittent vaginal spotting.  Does not yet have OB for this pregnancy.  Denies fever/chills, cp ,sob, n/v/d, urinary symtpoms.  Gestational DM with previous pregnancy.

## 2022-10-18 LAB
CULTURE RESULTS: SIGNIFICANT CHANGE UP
SPECIMEN SOURCE: SIGNIFICANT CHANGE UP

## 2022-10-23 ENCOUNTER — EMERGENCY (EMERGENCY)
Facility: HOSPITAL | Age: 22
LOS: 1 days | Discharge: HOME | End: 2022-10-23
Attending: STUDENT IN AN ORGANIZED HEALTH CARE EDUCATION/TRAINING PROGRAM | Admitting: STUDENT IN AN ORGANIZED HEALTH CARE EDUCATION/TRAINING PROGRAM

## 2022-10-23 VITALS
OXYGEN SATURATION: 100 % | HEART RATE: 92 BPM | SYSTOLIC BLOOD PRESSURE: 105 MMHG | RESPIRATION RATE: 16 BRPM | TEMPERATURE: 98 F | HEIGHT: 62 IN | DIASTOLIC BLOOD PRESSURE: 53 MMHG

## 2022-10-23 VITALS
HEART RATE: 80 BPM | SYSTOLIC BLOOD PRESSURE: 115 MMHG | TEMPERATURE: 98 F | DIASTOLIC BLOOD PRESSURE: 74 MMHG | OXYGEN SATURATION: 100 % | RESPIRATION RATE: 18 BRPM

## 2022-10-23 LAB
ALBUMIN SERPL ELPH-MCNC: 4.1 G/DL — SIGNIFICANT CHANGE UP (ref 3.3–5)
ALP SERPL-CCNC: 57 U/L — SIGNIFICANT CHANGE UP (ref 40–120)
ALT FLD-CCNC: 14 U/L — SIGNIFICANT CHANGE UP (ref 4–33)
ANION GAP SERPL CALC-SCNC: 13 MMOL/L — SIGNIFICANT CHANGE UP (ref 7–14)
APPEARANCE UR: ABNORMAL
AST SERPL-CCNC: 12 U/L — SIGNIFICANT CHANGE UP (ref 4–32)
BACTERIA # UR AUTO: ABNORMAL
BASOPHILS # BLD AUTO: 0.04 K/UL — SIGNIFICANT CHANGE UP (ref 0–0.2)
BASOPHILS NFR BLD AUTO: 0.4 % — SIGNIFICANT CHANGE UP (ref 0–2)
BILIRUB SERPL-MCNC: <0.2 MG/DL — SIGNIFICANT CHANGE UP (ref 0.2–1.2)
BILIRUB UR-MCNC: NEGATIVE — SIGNIFICANT CHANGE UP
BLD GP AB SCN SERPL QL: POSITIVE — SIGNIFICANT CHANGE UP
BUN SERPL-MCNC: 7 MG/DL — SIGNIFICANT CHANGE UP (ref 7–23)
CALCIUM SERPL-MCNC: 9.5 MG/DL — SIGNIFICANT CHANGE UP (ref 8.4–10.5)
CHLORIDE SERPL-SCNC: 100 MMOL/L — SIGNIFICANT CHANGE UP (ref 98–107)
CO2 SERPL-SCNC: 22 MMOL/L — SIGNIFICANT CHANGE UP (ref 22–31)
COLOR SPEC: SIGNIFICANT CHANGE UP
CREAT SERPL-MCNC: 0.5 MG/DL — SIGNIFICANT CHANGE UP (ref 0.5–1.3)
DIFF PNL FLD: ABNORMAL
EGFR: 135 ML/MIN/1.73M2 — SIGNIFICANT CHANGE UP
EOSINOPHIL # BLD AUTO: 0.18 K/UL — SIGNIFICANT CHANGE UP (ref 0–0.5)
EOSINOPHIL NFR BLD AUTO: 2 % — SIGNIFICANT CHANGE UP (ref 0–6)
EPI CELLS # UR: 3 /HPF — SIGNIFICANT CHANGE UP (ref 0–5)
GLUCOSE SERPL-MCNC: 121 MG/DL — HIGH (ref 70–99)
GLUCOSE UR QL: NEGATIVE — SIGNIFICANT CHANGE UP
HCG SERPL-ACNC: SIGNIFICANT CHANGE UP MIU/ML
HCT VFR BLD CALC: 32.1 % — LOW (ref 34.5–45)
HGB BLD-MCNC: 10.4 G/DL — LOW (ref 11.5–15.5)
HYALINE CASTS # UR AUTO: 3 /LPF — SIGNIFICANT CHANGE UP (ref 0–7)
IANC: 5.97 K/UL — SIGNIFICANT CHANGE UP (ref 1.8–7.4)
IMM GRANULOCYTES NFR BLD AUTO: 0.2 % — SIGNIFICANT CHANGE UP (ref 0–0.9)
KETONES UR-MCNC: NEGATIVE — SIGNIFICANT CHANGE UP
LEUKOCYTE ESTERASE UR-ACNC: ABNORMAL
LIDOCAIN IGE QN: 39 U/L — SIGNIFICANT CHANGE UP (ref 7–60)
LYMPHOCYTES # BLD AUTO: 2.28 K/UL — SIGNIFICANT CHANGE UP (ref 1–3.3)
LYMPHOCYTES # BLD AUTO: 25.2 % — SIGNIFICANT CHANGE UP (ref 13–44)
MCHC RBC-ENTMCNC: 27.2 PG — SIGNIFICANT CHANGE UP (ref 27–34)
MCHC RBC-ENTMCNC: 32.4 GM/DL — SIGNIFICANT CHANGE UP (ref 32–36)
MCV RBC AUTO: 84 FL — SIGNIFICANT CHANGE UP (ref 80–100)
MONOCYTES # BLD AUTO: 0.57 K/UL — SIGNIFICANT CHANGE UP (ref 0–0.9)
MONOCYTES NFR BLD AUTO: 6.3 % — SIGNIFICANT CHANGE UP (ref 2–14)
NEUTROPHILS # BLD AUTO: 5.97 K/UL — SIGNIFICANT CHANGE UP (ref 1.8–7.4)
NEUTROPHILS NFR BLD AUTO: 65.9 % — SIGNIFICANT CHANGE UP (ref 43–77)
NITRITE UR-MCNC: NEGATIVE — SIGNIFICANT CHANGE UP
NRBC # BLD: 0 /100 WBCS — SIGNIFICANT CHANGE UP (ref 0–0)
NRBC # FLD: 0 K/UL — SIGNIFICANT CHANGE UP (ref 0–0)
PH UR: 6 — SIGNIFICANT CHANGE UP (ref 5–8)
PLATELET # BLD AUTO: 329 K/UL — SIGNIFICANT CHANGE UP (ref 150–400)
POTASSIUM SERPL-MCNC: 3.4 MMOL/L — LOW (ref 3.5–5.3)
POTASSIUM SERPL-SCNC: 3.4 MMOL/L — LOW (ref 3.5–5.3)
PROT SERPL-MCNC: 7.4 G/DL — SIGNIFICANT CHANGE UP (ref 6–8.3)
PROT UR-MCNC: NEGATIVE — SIGNIFICANT CHANGE UP
RBC # BLD: 3.82 M/UL — SIGNIFICANT CHANGE UP (ref 3.8–5.2)
RBC # FLD: 12.6 % — SIGNIFICANT CHANGE UP (ref 10.3–14.5)
RBC CASTS # UR COMP ASSIST: 3 /HPF — SIGNIFICANT CHANGE UP (ref 0–4)
RH IG SCN BLD-IMP: NEGATIVE — SIGNIFICANT CHANGE UP
SODIUM SERPL-SCNC: 135 MMOL/L — SIGNIFICANT CHANGE UP (ref 135–145)
SP GR SPEC: 1.01 — LOW (ref 1.01–1.05)
UROBILINOGEN FLD QL: SIGNIFICANT CHANGE UP
WBC # BLD: 9.06 K/UL — SIGNIFICANT CHANGE UP (ref 3.8–10.5)
WBC # FLD AUTO: 9.06 K/UL — SIGNIFICANT CHANGE UP (ref 3.8–10.5)
WBC UR QL: 28 /HPF — HIGH (ref 0–5)

## 2022-10-23 PROCEDURE — 76830 TRANSVAGINAL US NON-OB: CPT | Mod: 26

## 2022-10-23 PROCEDURE — 99285 EMERGENCY DEPT VISIT HI MDM: CPT

## 2022-10-23 PROCEDURE — 86077 PHYS BLOOD BANK SERV XMATCH: CPT

## 2022-10-23 RX ORDER — NITROFURANTOIN MACROCRYSTAL 50 MG
100 CAPSULE ORAL ONCE
Refills: 0 | Status: COMPLETED | OUTPATIENT
Start: 2022-10-23 | End: 2022-10-23

## 2022-10-23 RX ORDER — NITROFURANTOIN MACROCRYSTAL 50 MG
1 CAPSULE ORAL
Qty: 14 | Refills: 0
Start: 2022-10-23 | End: 2022-10-29

## 2022-10-23 RX ADMIN — Medication 100 MILLIGRAM(S): at 23:16

## 2022-10-23 NOTE — ED PROVIDER NOTE - ATTENDING APP SHARED VISIT CONTRIBUTION OF CARE
I have personally performed a face to face medical and diagnostic evaluation of the patient. I have discussed with and reviewed the VICENTE's note and agree with the History, ROS, Physical Exam and MDM unless otherwise indicated. A brief summary of my personal evaluation and impression can be found below.    Dustin DE LUNA: 22F  10w preg w conf IUP recent visit, rh neg, given rho kailey x1 week ago presents with a cc of increase in vaginal bleeding x3 hours. No other complaints. No other discharge. No fever. No abdominal pain. Denies n/v/f/c/cp/sob.     All other ROS negative, except as above and as per HPI and ROS section.    VITALS: Initial triage and subsequent vitals have been reviewed by me.  GEN APPEARANCE: WDWN, alert, non-toxic, NAD  HEAD: Atraumatic.  EYES: PERRLa, EOMI, vision grossly intact.   NECK: Supple  CV: RRR, S1S2, no c/r/m/g. Cap refill <2 seconds. No bruits.   LUNGS: CTAB. No abnormal breath sounds.  ABDOMEN: Soft, NTND. No guarding or rebound.   MSK/EXT: No spinal or extremity point tenderness. No CVA ttp. Pelvis stable. No peripheral edema.  NEURO: Alert, follows commands. Weight bearing normal. Speech normal. Sensation and motor normal x4 extremities.   SKIN: Warm, dry and intact. No rash.  PSYCH: Appropriate   Exam as above    Plan/MDM:   Dustin DE LUNA: 22F  10w preg w conf IUP recent visit, rh neg, given rho kailey x1 week ago presents with a cc of increase in vaginal bleeding x3 hours. No other complaints. No other discharge. No fever. No abdominal pain. Denies n/v/f/c/cp/sob. exam vss non toxic PE as above ddx c/f likely threat ab, plan to check labs urine tvus meds as needed, reassess, no need for rho kailey again as pt got last visit.

## 2022-10-23 NOTE — ED PROVIDER NOTE - NSFOLLOWUPINSTRUCTIONS_ED_ALL_ED_FT
Thank you for visiting our Emergency Department, it has been a pleasure taking part in your healthcare.    Your discharge diagnosis is: vaginal bleeding in pregnancy  Please take all discharge medications as indicated below:  Please continue all medications as rx'd by your PMD.  Please follow up with your PMD within x48 hours.  Please follow up with OBGYN within x48 hours.  A copy of resulted labs, imaging, and findings have been provided to you.   You have had a detailed discussion with your provider regarding your diagnosis, care management and discharge planning including, but not limited to: return precautions, follow up visits with existing or new providers, new prescriptions and/or medication changes, wound and/or splint/cast care or other care   aspects specific to your diagnosis and treatment. You have been given the opportunity to have your questions answered. At this time you have been deemed stable and fit for discharge.  Return precautions to the Emergency Department include but are not limited to: unrelenting nausea, vomiting, fever, chills, chest pain, shortness of breath, dizziness, chest or abdominal pain, worsening back pain, syncope, blood in urine or stool, headache that doesn't resolve, numbness or tingling, loss of sensation, loss of motor function, or any other concerning symptoms. Thank you for visiting our Emergency Department, it has been a pleasure taking part in your healthcare.    Your discharge diagnosis is: vaginal bleeding in pregnancy  Please take all discharge medications as indicated below:  Please continue all medications as rx'd by your PMD.  Macrobid 100mg twice a day x7 days.   Please follow up with your PMD within x48 hours.  Please follow up with OBGYN within x48 hours.  A copy of resulted labs, imaging, and findings have been provided to you.   You have had a detailed discussion with your provider regarding your diagnosis, care management and discharge planning including, but not limited to: return precautions, follow up visits with existing or new providers, new prescriptions and/or medication changes, wound and/or splint/cast care or other care   aspects specific to your diagnosis and treatment. You have been given the opportunity to have your questions answered. At this time you have been deemed stable and fit for discharge.  Return precautions to the Emergency Department include but are not limited to: unrelenting nausea, vomiting, fever, chills, chest pain, shortness of breath, dizziness, chest or abdominal pain, worsening back pain, syncope, blood in urine or stool, headache that doesn't resolve, numbness or tingling, loss of sensation, loss of motor function, or any other concerning symptoms.

## 2022-10-23 NOTE — ED PROVIDER NOTE - CLINICAL SUMMARY MEDICAL DECISION MAKING FREE TEXT BOX
23 y/o female  10 weeks pregnant with confirmed IUP LMP august presents to ED c/o increase in vaginal bleeding x 3 hours. Pt states she started having vaginal spotting 2 weeks ago however US was normal. States bleeding got heavier last 3 hours used about 1 pad. No cp, sob, palpitations, fevers, n/v, abd pain. Pt does not have OB, has first apt upcoming. pt with minimal pooling on exam, hemodynamically stable. r/o miscarriage. plan to check US, labs, type and screen.

## 2022-10-23 NOTE — ED ADULT NURSE NOTE - OBJECTIVE STATEMENT
Pt A&O x 4, OOB w/o assist, shows no signs of acute distress. Brought into the ED for vaginal bleeding. Pt states that she is currently 10 weeks pregnant, , and has been experiencing vaginal bleeding x 1 week. Pt was seen in the ED approx 1 week ago for a similar CC. Pt states she saturated 1 sanitary napkin since this AM. Denies clots. Pt also denies chest discomfort, SOB, dizziness, or blurry vision. SpO2 100% on room air. Respirations even and unlabored. 20 gauge IV line placed in left AC. Labs drawn. Will continue to monitor.

## 2022-10-23 NOTE — ED PROVIDER NOTE - PATIENT PORTAL LINK FT
You can access the FollowMyHealth Patient Portal offered by [facility] by registering at the following website: [email or web address]. By joining [employer]’s FollowMyHealth portal, you will also be able to view your health information using other applications (apps) compatible with our system.

## 2022-10-23 NOTE — ED PROVIDER NOTE - CARE PLAN
Principal Discharge DX:	Vaginal bleeding in pregnancy   1 Principal Discharge DX:	Vaginal bleeding in pregnancy  Secondary Diagnosis:	UTI (urinary tract infection)

## 2022-10-25 LAB
CULTURE RESULTS: SIGNIFICANT CHANGE UP
SPECIMEN SOURCE: SIGNIFICANT CHANGE UP

## 2022-10-26 ENCOUNTER — ASOB RESULT (OUTPATIENT)
Age: 22
End: 2022-10-26

## 2022-10-26 ENCOUNTER — APPOINTMENT (OUTPATIENT)
Dept: OBGYN | Facility: CLINIC | Age: 22
End: 2022-10-26

## 2022-10-26 ENCOUNTER — APPOINTMENT (OUTPATIENT)
Dept: OBGYN | Facility: HOSPITAL | Age: 22
End: 2022-10-26

## 2022-10-26 VITALS
WEIGHT: 155 LBS | DIASTOLIC BLOOD PRESSURE: 78 MMHG | HEIGHT: 61 IN | BODY MASS INDEX: 29.27 KG/M2 | SYSTOLIC BLOOD PRESSURE: 110 MMHG | HEART RATE: 92 BPM

## 2022-10-26 DIAGNOSIS — Z86.32 PERSONAL HISTORY OF GESTATIONAL DIABETES: ICD-10-CM

## 2022-10-26 DIAGNOSIS — O36.80X0 PREGNANCY WITH INCONCLUSIVE FETAL VIABILITY, NOT APPLICABLE OR UNSPECIFIED: ICD-10-CM

## 2022-10-26 DIAGNOSIS — Z92.29 PERSONAL HISTORY OF OTHER DRUG THERAPY: ICD-10-CM

## 2022-10-26 DIAGNOSIS — Z23 ENCOUNTER FOR IMMUNIZATION: ICD-10-CM

## 2022-10-26 PROCEDURE — 76816 OB US FOLLOW-UP PER FETUS: CPT

## 2022-10-26 PROCEDURE — 99214 OFFICE O/P EST MOD 30 MIN: CPT | Mod: TH

## 2022-10-26 RX ORDER — BLOOD SUGAR DIAGNOSTIC
STRIP MISCELLANEOUS 4 TIMES DAILY
Qty: 100 | Refills: 4 | Status: COMPLETED | COMMUNITY
Start: 2022-01-24 | End: 2022-10-26

## 2022-10-26 RX ORDER — BETA CAROTENE, CHOLECALCIFEROL, DL-ALPHA TOCOPHERYL ACETATE, ASCORBIC ACID, FOLIC ACID, THIAMIN MONONITRATE, RIBOFLAVIN, NIACINAMIDE, PYRIDOXINE HYDROCHLORIDE, CYANOCOBALAMIN, CALCIUM CARBONATE, POTAS 120; 4000; 200; 400; 8; 29; 1; 20; 150; 3; 3; 3; 15 MG/1; [IU]/1; MG/1; [IU]/1; UG/1; MG/1; MG/1; MG/1; UG/1; MG/1; MG/1; MG/1; MG/1
29-1 TABLET, FILM COATED ORAL DAILY
Qty: 30 | Refills: 11 | Status: ACTIVE | COMMUNITY
Start: 1900-01-01 | End: 1900-01-01

## 2022-10-26 RX ORDER — BLOOD-GLUCOSE METER
W/DEVICE KIT MISCELLANEOUS
Qty: 1 | Refills: 0 | Status: COMPLETED | COMMUNITY
Start: 2022-01-24 | End: 2022-10-26

## 2022-11-02 PROBLEM — Z92.29 HISTORY OF INFLUENZA VACCINATION: Status: RESOLVED | Noted: 2021-10-29 | Resolved: 2022-11-02

## 2022-11-02 PROBLEM — Z23 NEED FOR DTAP VACCINATION: Status: RESOLVED | Noted: 2022-02-08 | Resolved: 2022-11-02

## 2022-11-02 LAB
C TRACH RRNA SPEC QL NAA+PROBE: NOT DETECTED
N GONORRHOEA RRNA SPEC QL NAA+PROBE: NOT DETECTED
SOURCE AMPLIFICATION: NORMAL

## 2022-11-02 RX ORDER — NITROFURANTOIN (MONOHYDRATE/MACROCRYSTALS) 25; 75 MG/1; MG/1
100 CAPSULE ORAL
Refills: 0 | Status: ACTIVE | COMMUNITY

## 2022-11-17 ENCOUNTER — EMERGENCY (EMERGENCY)
Facility: HOSPITAL | Age: 22
LOS: 1 days | Discharge: HOME | End: 2022-11-17
Admitting: EMERGENCY MEDICINE

## 2022-11-17 ENCOUNTER — APPOINTMENT (OUTPATIENT)
Dept: ANTEPARTUM | Facility: CLINIC | Age: 22
End: 2022-11-17

## 2022-11-17 VITALS
TEMPERATURE: 98 F | DIASTOLIC BLOOD PRESSURE: 60 MMHG | SYSTOLIC BLOOD PRESSURE: 98 MMHG | HEART RATE: 91 BPM | HEIGHT: 62 IN | RESPIRATION RATE: 16 BRPM | OXYGEN SATURATION: 100 %

## 2022-11-17 PROCEDURE — 99285 EMERGENCY DEPT VISIT HI MDM: CPT

## 2022-11-17 NOTE — ED ADULT TRIAGE NOTE - CHIEF COMPLAINT QUOTE
pt is 14 weeks pregnant, due date 5/31. pt c/o pelvic pain with vaginal pressure and vaginal bleeding/ discharge beginning tonight. denies any other complaints, no PMH

## 2022-11-18 VITALS
DIASTOLIC BLOOD PRESSURE: 62 MMHG | RESPIRATION RATE: 16 BRPM | TEMPERATURE: 98 F | HEART RATE: 89 BPM | OXYGEN SATURATION: 99 % | SYSTOLIC BLOOD PRESSURE: 105 MMHG

## 2022-11-18 LAB
ALBUMIN SERPL ELPH-MCNC: 4.3 G/DL — SIGNIFICANT CHANGE UP (ref 3.3–5)
ALP SERPL-CCNC: 52 U/L — SIGNIFICANT CHANGE UP (ref 40–120)
ALT FLD-CCNC: 11 U/L — SIGNIFICANT CHANGE UP (ref 4–33)
ANION GAP SERPL CALC-SCNC: 14 MMOL/L — SIGNIFICANT CHANGE UP (ref 7–14)
APPEARANCE UR: ABNORMAL
AST SERPL-CCNC: 10 U/L — SIGNIFICANT CHANGE UP (ref 4–32)
BACTERIA # UR AUTO: ABNORMAL
BASOPHILS # BLD AUTO: 0.05 K/UL — SIGNIFICANT CHANGE UP (ref 0–0.2)
BASOPHILS NFR BLD AUTO: 0.5 % — SIGNIFICANT CHANGE UP (ref 0–2)
BILIRUB SERPL-MCNC: <0.2 MG/DL — SIGNIFICANT CHANGE UP (ref 0.2–1.2)
BILIRUB UR-MCNC: NEGATIVE — SIGNIFICANT CHANGE UP
BLD GP AB SCN SERPL QL: POSITIVE — SIGNIFICANT CHANGE UP
BUN SERPL-MCNC: 5 MG/DL — LOW (ref 7–23)
CALCIUM SERPL-MCNC: 9.5 MG/DL — SIGNIFICANT CHANGE UP (ref 8.4–10.5)
CHLORIDE SERPL-SCNC: 100 MMOL/L — SIGNIFICANT CHANGE UP (ref 98–107)
CO2 SERPL-SCNC: 21 MMOL/L — LOW (ref 22–31)
COLOR SPEC: SIGNIFICANT CHANGE UP
CREAT SERPL-MCNC: 0.49 MG/DL — LOW (ref 0.5–1.3)
DIFF PNL FLD: ABNORMAL
EGFR: 136 ML/MIN/1.73M2 — SIGNIFICANT CHANGE UP
EOSINOPHIL # BLD AUTO: 0.15 K/UL — SIGNIFICANT CHANGE UP (ref 0–0.5)
EOSINOPHIL NFR BLD AUTO: 1.4 % — SIGNIFICANT CHANGE UP (ref 0–6)
EPI CELLS # UR: 4 /HPF — SIGNIFICANT CHANGE UP (ref 0–5)
GLUCOSE SERPL-MCNC: 97 MG/DL — SIGNIFICANT CHANGE UP (ref 70–99)
GLUCOSE UR QL: NEGATIVE — SIGNIFICANT CHANGE UP
HCG SERPL-ACNC: SIGNIFICANT CHANGE UP MIU/ML
HCT VFR BLD CALC: 33.1 % — LOW (ref 34.5–45)
HGB BLD-MCNC: 11 G/DL — LOW (ref 11.5–15.5)
IANC: 7.47 K/UL — HIGH (ref 1.8–7.4)
IMM GRANULOCYTES NFR BLD AUTO: 0.4 % — SIGNIFICANT CHANGE UP (ref 0–0.9)
KETONES UR-MCNC: NEGATIVE — SIGNIFICANT CHANGE UP
LEUKOCYTE ESTERASE UR-ACNC: ABNORMAL
LYMPHOCYTES # BLD AUTO: 2.31 K/UL — SIGNIFICANT CHANGE UP (ref 1–3.3)
LYMPHOCYTES # BLD AUTO: 21.7 % — SIGNIFICANT CHANGE UP (ref 13–44)
MCHC RBC-ENTMCNC: 27.9 PG — SIGNIFICANT CHANGE UP (ref 27–34)
MCHC RBC-ENTMCNC: 33.2 GM/DL — SIGNIFICANT CHANGE UP (ref 32–36)
MCV RBC AUTO: 84 FL — SIGNIFICANT CHANGE UP (ref 80–100)
MONOCYTES # BLD AUTO: 0.63 K/UL — SIGNIFICANT CHANGE UP (ref 0–0.9)
MONOCYTES NFR BLD AUTO: 5.9 % — SIGNIFICANT CHANGE UP (ref 2–14)
NEUTROPHILS # BLD AUTO: 7.47 K/UL — HIGH (ref 1.8–7.4)
NEUTROPHILS NFR BLD AUTO: 70.1 % — SIGNIFICANT CHANGE UP (ref 43–77)
NITRITE UR-MCNC: POSITIVE
NRBC # BLD: 0 /100 WBCS — SIGNIFICANT CHANGE UP (ref 0–0)
NRBC # FLD: 0 K/UL — SIGNIFICANT CHANGE UP (ref 0–0)
PH UR: 6 — SIGNIFICANT CHANGE UP (ref 5–8)
PLATELET # BLD AUTO: 294 K/UL — SIGNIFICANT CHANGE UP (ref 150–400)
POTASSIUM SERPL-MCNC: 3.8 MMOL/L — SIGNIFICANT CHANGE UP (ref 3.5–5.3)
POTASSIUM SERPL-SCNC: 3.8 MMOL/L — SIGNIFICANT CHANGE UP (ref 3.5–5.3)
PROT SERPL-MCNC: 7.4 G/DL — SIGNIFICANT CHANGE UP (ref 6–8.3)
PROT UR-MCNC: ABNORMAL
RBC # BLD: 3.94 M/UL — SIGNIFICANT CHANGE UP (ref 3.8–5.2)
RBC # FLD: 13.1 % — SIGNIFICANT CHANGE UP (ref 10.3–14.5)
RBC CASTS # UR COMP ASSIST: 290 /HPF — HIGH (ref 0–4)
RH IG SCN BLD-IMP: NEGATIVE — SIGNIFICANT CHANGE UP
SODIUM SERPL-SCNC: 135 MMOL/L — SIGNIFICANT CHANGE UP (ref 135–145)
SP GR SPEC: 1.01 — SIGNIFICANT CHANGE UP (ref 1.01–1.05)
UROBILINOGEN FLD QL: SIGNIFICANT CHANGE UP
WBC # BLD: 10.65 K/UL — HIGH (ref 3.8–10.5)
WBC # FLD AUTO: 10.65 K/UL — HIGH (ref 3.8–10.5)
WBC UR QL: 295 /HPF — HIGH (ref 0–5)

## 2022-11-18 PROCEDURE — 76801 OB US < 14 WKS SINGLE FETUS: CPT | Mod: 26

## 2022-11-18 PROCEDURE — 86077 PHYS BLOOD BANK SERV XMATCH: CPT

## 2022-11-18 RX ORDER — SODIUM CHLORIDE 9 MG/ML
1000 INJECTION INTRAMUSCULAR; INTRAVENOUS; SUBCUTANEOUS ONCE
Refills: 0 | Status: COMPLETED | OUTPATIENT
Start: 2022-11-18 | End: 2022-11-18

## 2022-11-18 RX ORDER — AMOXICILLIN 250 MG/5ML
1 SUSPENSION, RECONSTITUTED, ORAL (ML) ORAL
Qty: 14 | Refills: 0
Start: 2022-11-18 | End: 2022-11-24

## 2022-11-18 RX ORDER — ONDANSETRON 8 MG/1
4 TABLET, FILM COATED ORAL ONCE
Refills: 0 | Status: COMPLETED | OUTPATIENT
Start: 2022-11-18 | End: 2022-11-18

## 2022-11-18 RX ORDER — ACETAMINOPHEN 500 MG
650 TABLET ORAL ONCE
Refills: 0 | Status: COMPLETED | OUTPATIENT
Start: 2022-11-18 | End: 2022-11-18

## 2022-11-18 RX ADMIN — Medication 650 MILLIGRAM(S): at 03:39

## 2022-11-18 RX ADMIN — ONDANSETRON 4 MILLIGRAM(S): 8 TABLET, FILM COATED ORAL at 03:40

## 2022-11-18 RX ADMIN — SODIUM CHLORIDE 1000 MILLILITER(S): 9 INJECTION INTRAMUSCULAR; INTRAVENOUS; SUBCUTANEOUS at 03:39

## 2022-11-18 NOTE — ED PROVIDER NOTE - NSFOLLOWUPINSTRUCTIONS_ED_ALL_ED_FT
1) Follow up with your scheduled GYN appointment for further evaluation  2) Return to the ED immediately for new or worsening symptoms including chest pain, shortness of breath, nausea/vomiting  3) Please continue to take any home medications as prescribed. Take Tylenol 325 mg every 4 hours for pain relief/fever control   4) Your test results from your ED visit were discussed with you prior to discharge  5) You were provided with a copy of your test results

## 2022-11-18 NOTE — ED PROVIDER NOTE - CLINICAL SUMMARY MEDICAL DECISION MAKING FREE TEXT BOX
23 Y/O F  LMP  presents to ER w/ vaginal bleeding  US assess pregnancy  CBC, CMP, T&S, HCG and UA  Re-evaluate 23 Y/O F  LMP  presents to ER w/ vaginal bleeding  US assess pregnancy  CBC, CMP, T&S, HCG and UA  PRescribed ABX for UTI previous visit.   Re-evaluate

## 2022-11-18 NOTE — ED ADULT NURSE NOTE - OBJECTIVE STATEMENT
patient  came to ER with c/o pelvic pressure and pain . pt is 14 weeks pregnant. alert and oriented. also c/o vaginal discharge since few hours. labs sent right ac 20g placed. awaiting for ultrasound.

## 2022-11-18 NOTE — ED PROVIDER NOTE - PHYSICAL EXAMINATION
Vital signs reviewed.   CONSTITUTIONAL: Well-appearing; well-nourished; in no apparent distress. Non-toxic appearing.   HEAD: Normocephalic, atraumatic.  EYES: Normal conjunctiva and no sclera injection noted  ENT: normal nose; no rhinorrhea.  CARD: Normal S1, S2  RESP: Normal chest excursion with respiration; breath sounds clear and equal bilaterally  ABD/GI: soft, non-distended; non-tender  : Declined  EXT/MS: moves all extremities; distal pulses are normal, no pedal edema.  SKIN: Normal for age and race; warm; dry; good turgor; no apparent lesions or exudate noted.  NEURO: Awake, alert, oriented x 3, no gross deficits, CN II-XII grossly intact, no motor or sensory deficit noted.  PSYCH: Normal mood; appropriate affect.

## 2022-11-18 NOTE — ED PROVIDER NOTE - NS ED ROS FT
Constitutional: (-) fever   Head: Normal cephalic, Atraumatic  Cardiovascular: (-) chest pain, (-) wheezing  Respiratory: (-) cough, (-) shortness of breath  Gastrointestinal: (-) vomiting, (-) diarrhea, (+) abdominal pain  : (-) dysuria (+) vaginal bleeding  Musculoskeletal: (-) back pain  Integumentary: (-) rash, (-) edema  Neurological: (-)loc  Allergic/Immunologic: (-) pruritus

## 2022-11-18 NOTE — ED PROVIDER NOTE - OBJECTIVE STATEMENT
21 Y/O F  LMP  presents to ER w/ vaginal bleeding. Admits suprapubic pressure and cramping prior to arrival. Went to bathroom and noted blood in toilet and on tissue paper. Has experienced light spotting since onset. Utilized 1 tampon. Has yet to see GYN has first appointment next week at [facility]. Denies fever, chills, nausea/vomiting, headache, chest pain, shortness of breath, palpitations, syncope, dysuria or hematuria 21 Y/O F  LMP  presents to ER w/ vaginal bleeding. Admits suprapubic pressure and cramping prior to arrival. Went to bathroom and noted blood in toilet and on tissue paper. Has experienced light spotting since onset. Utilized 1 tampon. Seen in ER last month for similar complaint. Has yet to see GYN has first appointment next week at [facility].  Denies fever, chills, nausea/vomiting, headache, chest pain, shortness of breath, palpitations, syncope, dysuria or hematuria

## 2022-11-18 NOTE — ED PROVIDER NOTE - PATIENT PORTAL LINK FT
You can access the FollowMyHealth Patient Portal offered by [facility] by registering at the following website: [email or web address]. By joining [employer]’s FollowMyHealth portal, you will also be able to view your health information using other applications (apps) compatible with our system.

## 2022-11-18 NOTE — ED PROVIDER NOTE - CONDITION AT DISCHARGE:
"  [facility]      Name: Audrey Bronson huey  Clinic Number: [id number]    Date of Treatment: 2019     Diagnosis:   Encounter Diagnoses   Name Primary?    Weakness of left lower extremity     Decreased ROM of lumbar spine      Physician: Donaldo Pena MD    Precautions: Fall     Pattern of pain determined: Pattern 1     Evaluation: 19  Authorization period: 2019 - 2019  Plan of care Expiration: 2019  Reassessment Due: 3/1/19  Visit # / Visits authorized:     Time In: 8:00a  Time Out:  9:10a  Total Billable Time: 60 minutes     Subjective   Audrey reports she is feeling good today. She was feeling some pain yesterday but she thinks that is because of the weather.     Patient reports tolerating previous visit well.  No increase in symptoms.  Patient reports their pain to be 4/10 on a 0-10 scale with 0 being no pain and 10 being the worst pain imaginable.  Pain Location: low back and L hip     Occupation:  None    Leisure: tv, clean house                      Pts goals:  "I was still doing all the stretches since the last time I came here, so I just want to have decreased pain"     Objective     Baseline IM Testing Results:   Date of testin19  ROM 42 deg   Max Peak Torque 161    Min Peak Torque 49    Flex/Ext Ratio 3.29   % below normative data 39     Outcomes:  Initial score:  least 60% but < 80% impaired, limited or restricted  Visit 5 score:  Goal: at least 40% but < 60% impaired, limited or restricted      Treatment    Pt was instructed in and performed the following:     Audrey received therapeutic exercises to develop/improved posture, cardiovascular endurance, muscular endurance, lumbar/cervical ROM, strength and muscular endurance for 60 minutes including the following exercises:     DKTC 10x 10"  LTR  x20 B  HSS with strap 30"x 3  TrA with SLR x10 each  clams with YTBx20 each    BOLDED EXERCISES WERE PERFORMED THIS " VISIT    [facility] 2/19/2019   Visit Number 4   VAS Pain Rating 3   Treadmill Time (in min.) 10   Speed 1   Incline 0   Flexion in Lying -   Lumbar Extension Seat Pad -   Femur Restraint -   Top Dead Center -   Counterweight -   Lumbar Flexion -   Lumbar Extension -   Lumbar Peak Torque -   Min Torque -   Test Percent Below Normative Data -   Lumbar Weight 50   Repetitions 20   Rating of Perceived Exertion 1   Ice - Z Lie (in min.) 10     Peripheral muscle strengthening which included 1 set of 15-20 repetitions at a slow, controlled 7 second per rep pace focused on strengthening supporting musculature for improved body mechanics and functional mobility.  Pt and therapist focused on proper form during treatment to ensure optimal strengthening of each targeted muscle group.  Machines were utilized including torso rotation, leg extension, leg curl, chest press, upright row. Tricep extension, bicep curl, leg press, and hip abduction added visit 3    Audrey received the following manual therapy techniques: 0 minutes       Home Exercise Program as follows:   Pt demo good understanding of the education provided. Audrey demonstrated good return demonstration of activities.   Lumbar roll use compliance: NA  Additional exercises taught this treatment session: none    Assessment     Pt tolerated session well. Pt performed lumbar extension on MedX machine with 50 lbs, 20 reps, with an RPE of 1. She was able to perform all peripheral strengthening on machines today with good tolerance. Pt demonstrates weak LLE during SLR and requires assistance to get into the correct position on machines. Pt required cueing for exercise technique, TrA sets and controlled motion. PT reviewed HEP with pt to ensure compliance. Pt did not have any complaints of pain with any exercises performed today. She is appropriate to continue with therapy as planned. She will continue to require encouragement for adherence to her HEP and program.       Patient is making fair progress towards established goals.  Pt will continue to benefit from skilled outpatient physical therapy to address the deficits stated in the impairment chart, provide pt/family education and to maximize pt's level of independence in the home and community environment.       Pt's spiritual, cultural and educational needs considered and pt agreeable to plan of care and goals as stated below:     Medical necessity is demonstrated by the following problem list.    Pt presents with the following impairments:           History  Co-morbidities and personal factors that may impact the plan of care Examination  Body Structures and Functions, activity limitations and participation restrictions that may impact the plan of care Clinical Presentation    Decision Making/ Complexity Score    Co-morbidities:   COPD/asthma, diabetes, difficulty sleeping, high BMI and HTN           Personal Factors:   lifestyle Body Regions:   back  lower extremities  upper extremities  trunk     Body Systems:   ROM  strength  balance  gait  transitions  motor control     Activity limitations:   Learning and applying knowledge  no deficits     General Tasks and Commands  no deficits     Communication  no deficits     Mobility  lifting and carrying objects  walking  driving (bike, car, motorcycle)     Self care  washing oneself (bathing, drying, washing hands)  caring for body parts (brushing teeth, shaving, grooming)  toileting  dressing     Domestic Life  shopping  cooking  doing house work (cleaning house, washing dishes, laundry)  assisting others     Interactions/Relationships  no deficits     Life Areas  no deficits     Community and Social Life  no deficits     Participation Restrictions:   Min to mod        stable and uncomplicated    Low based on FOTO Score             GOALS: Pt is in agreement with the following goals.     Short term goals:  6 weeks or 10 visits   1.  Pt will demonstrate increased lumbar ROM by  at least 5 degrees from the initial ROM value with improvements noted in functional ROM and ability to perform ADLs  2.  Pt will demonstrate increased maximum isometric torque value by % when compared to the initial value resulting in improved ability to perform bending, lifting, and carrying activities safely, confidently. - will perform next visit and make goal  3.  Patient report a reduction in worst pain score by 3-4 points for improved tolerance during work and recreational activities  4. Pt to improve BLE by 1/2 MMT to improve functional mobility and participation in ADLs.   5.  Pt able to perform HEP correctly with minimal cueing or supervision for therapist        Long term goals: 13 weeks or 20 visits   1. Pt will demonstrate increased lumbar flexion by at least 10 degrees from initial ROM value, resulting in improved ability to perform functional fwd bending while standing and sitting.   2. Pt will demonstrate increased maximum isometric torque value by % when compared to the initial value resulting in improved ability to perform bending, lifting, and carrying activities safely, confidently. Will assess next visit and make goal  3. Pt to demonstrate ability to independently control and reduce their pain through posture positioning and mechanical movements throughout a typical day.  4. Pt to improve BLE by 1 MMT to improve functional mobility and participation in ADLs.   4.  Patient will demonstrate improved overall function per FOTO Survey to CK = at least 40% but < 60% impaired, limited or restricted score or less.      Plan   Continue with established Plan of Care towards established PT goals.     Mayur Cox, PT & Karolina Spicer, SPT   2/14/2019   Improved

## 2022-11-18 NOTE — ED PROVIDER NOTE - PRO INTERPRETER NEED 2
Patient here as a new patient for heels spurs to the left heel. Patient states that this has been ongoing for about 5 weeks now and the pain is getting worse. Patient has done exercises and PT with stretching of the left leg/foot.  Last ov with pcp was 11/30/2020 with Lazarus Goode MD        Electronically signed by Olesya Rosenberg MA on 1/26/2021 at 3:26 PM English

## 2022-11-20 LAB
-  AMIKACIN: SIGNIFICANT CHANGE UP
-  AMOXICILLIN/CLAVULANIC ACID: SIGNIFICANT CHANGE UP
-  AMPICILLIN/SULBACTAM: SIGNIFICANT CHANGE UP
-  AMPICILLIN: SIGNIFICANT CHANGE UP
-  AZTREONAM: SIGNIFICANT CHANGE UP
-  CEFAZOLIN: SIGNIFICANT CHANGE UP
-  CEFEPIME: SIGNIFICANT CHANGE UP
-  CEFOXITIN: SIGNIFICANT CHANGE UP
-  CEFTRIAXONE: SIGNIFICANT CHANGE UP
-  CIPROFLOXACIN: SIGNIFICANT CHANGE UP
-  ERTAPENEM: SIGNIFICANT CHANGE UP
-  GENTAMICIN: SIGNIFICANT CHANGE UP
-  IMIPENEM: SIGNIFICANT CHANGE UP
-  LEVOFLOXACIN: SIGNIFICANT CHANGE UP
-  MEROPENEM: SIGNIFICANT CHANGE UP
-  NITROFURANTOIN: SIGNIFICANT CHANGE UP
-  PIPERACILLIN/TAZOBACTAM: SIGNIFICANT CHANGE UP
-  TOBRAMYCIN: SIGNIFICANT CHANGE UP
-  TRIMETHOPRIM/SULFAMETHOXAZOLE: SIGNIFICANT CHANGE UP
CULTURE RESULTS: SIGNIFICANT CHANGE UP
METHOD TYPE: SIGNIFICANT CHANGE UP
ORGANISM # SPEC MICROSCOPIC CNT: SIGNIFICANT CHANGE UP
ORGANISM # SPEC MICROSCOPIC CNT: SIGNIFICANT CHANGE UP
SPECIMEN SOURCE: SIGNIFICANT CHANGE UP

## 2022-11-21 RX ORDER — NITROFURANTOIN MACROCRYSTAL 50 MG
1 CAPSULE ORAL
Qty: 14 | Refills: 0
Start: 2022-11-21 | End: 2022-11-27

## 2022-11-21 NOTE — ED POST DISCHARGE NOTE - RESULT SUMMARY
UCX: Klebsiella aerogenes > 100,000 Patient discharged home with a prescription for Augmentin which is resistant. Patient pregnant. Patient contacted feeling better denies discussed with patient will switch Abx to Macrobid which is sensitive and patient to stop Augmentin. Discussed with patient to follow up with OB. Discussed with patient need to return to ED if symptoms don't continue to improve or recur or develops any new or worsening symptoms that are of concern.

## 2022-12-01 ENCOUNTER — APPOINTMENT (OUTPATIENT)
Dept: OBGYN | Facility: HOSPITAL | Age: 22
End: 2022-12-01

## 2022-12-01 ENCOUNTER — APPOINTMENT (OUTPATIENT)
Dept: OBGYN | Facility: CLINIC | Age: 22
End: 2022-12-01

## 2022-12-01 DIAGNOSIS — O21.9 VOMITING OF PREGNANCY, UNSPECIFIED: ICD-10-CM

## 2022-12-01 PROCEDURE — 99213 OFFICE O/P EST LOW 20 MIN: CPT | Mod: TH

## 2022-12-01 RX ORDER — DOXYLAMINE SUCCINATE AND PYRIDOXINE HYDROCHLORIDE 10; 10 MG/1; MG/1
10-10 TABLET, DELAYED RELEASE ORAL
Qty: 60 | Refills: 2 | Status: ACTIVE | COMMUNITY
Start: 2022-12-01 | End: 1900-01-01

## 2022-12-01 RX ORDER — CLOTRIMAZOLE 10 MG/G
1 CREAM TOPICAL TWICE DAILY
Qty: 1 | Refills: 0 | Status: ACTIVE | COMMUNITY
Start: 2022-12-01 | End: 1900-01-01

## 2022-12-04 LAB
ABO + RH PNL BLD: NORMAL
APPEARANCE: ABNORMAL
BACTERIA UR CULT: NORMAL
BACTERIA: NEGATIVE
BASOPHILS # BLD AUTO: 0.06 K/UL
BASOPHILS NFR BLD AUTO: 0.6 %
BILIRUBIN URINE: NEGATIVE
BLOOD URINE: NEGATIVE
COLOR: NORMAL
EOSINOPHIL # BLD AUTO: 0.14 K/UL
EOSINOPHIL NFR BLD AUTO: 1.4 %
ESTIMATED AVERAGE GLUCOSE: 108 MG/DL
GLUCOSE QUALITATIVE U: NEGATIVE
GLUCOSE SERPL-MCNC: 91 MG/DL
HAV IGM SER QL: NONREACTIVE
HBA1C MFR BLD HPLC: 5.4 %
HBV CORE IGM SER QL: NONREACTIVE
HBV SURFACE AG SER QL: NONREACTIVE
HCT VFR BLD CALC: 35 %
HCV AB SER QL: NONREACTIVE
HCV S/CO RATIO: 0.08 S/CO
HGB A MFR BLD: 97.2 %
HGB A2 MFR BLD: 2.8 %
HGB BLD-MCNC: 10.7 G/DL
HGB FRACT BLD-IMP: NORMAL
HIV1+2 AB SPEC QL IA.RAPID: NONREACTIVE
HYALINE CASTS: 1 /LPF
IMM GRANULOCYTES NFR BLD AUTO: 0.4 %
KETONES URINE: NORMAL
LEAD BLD-MCNC: 1.7 UG/DL
LEUKOCYTE ESTERASE URINE: ABNORMAL
LYMPHOCYTES # BLD AUTO: 2.13 K/UL
LYMPHOCYTES NFR BLD AUTO: 21.6 %
M TB IFN-G BLD-IMP: NEGATIVE
MAN DIFF?: NORMAL
MCHC RBC-ENTMCNC: 27.6 PG
MCHC RBC-ENTMCNC: 30.6 GM/DL
MCV RBC AUTO: 90.2 FL
MEV IGG FLD QL IA: 81.6 AU/ML
MEV IGG+IGM SER-IMP: POSITIVE
MICROSCOPIC-UA: NORMAL
MONOCYTES # BLD AUTO: 0.51 K/UL
MONOCYTES NFR BLD AUTO: 5.2 %
MUV AB SER-ACNC: POSITIVE
MUV IGG SER QL IA: 51.2 AU/ML
NEUTROPHILS # BLD AUTO: 7 K/UL
NEUTROPHILS NFR BLD AUTO: 70.8 %
NITRITE URINE: NEGATIVE
PH URINE: 6
PLATELET # BLD AUTO: 265 K/UL
PROTEIN URINE: NEGATIVE
QUANTIFERON TB PLUS MITOGEN MINUS NIL: 9.54 IU/ML
QUANTIFERON TB PLUS NIL: 0.02 IU/ML
QUANTIFERON TB PLUS TB1 MINUS NIL: 0.01 IU/ML
QUANTIFERON TB PLUS TB2 MINUS NIL: 0 IU/ML
RBC # BLD: 3.88 M/UL
RBC # FLD: 13.9 %
RED BLOOD CELLS URINE: 2 /HPF
RUBV IGG FLD-ACNC: 4.8 INDEX
RUBV IGG SER-IMP: POSITIVE
SPECIFIC GRAVITY URINE: 1.01
SQUAMOUS EPITHELIAL CELLS: 6 /HPF
T PALLIDUM AB SER QL IA: NEGATIVE
URINE COMMENTS: NORMAL
UROBILINOGEN URINE: NORMAL
WBC # FLD AUTO: 9.88 K/UL
WHITE BLOOD CELLS URINE: 61 /HPF

## 2022-12-12 LAB
CLARI ADDITIONAL INFO: NORMAL
CLARI CHROMOSOME 13: NORMAL
CLARI CHROMOSOME 18: NORMAL
CLARI CHROMOSOME 21: NORMAL
CLARI SEX CHROMOSOMES: NORMAL
CLARI TEST COMMENT: NORMAL
CLARITEST NIPT: NORMAL
FETAL FRACT: NORMAL
GESTATION AGE: NORMAL
MATERNAL WEIGHT (LBS):: NORMAL
PLEASE INCLUDE GENDER RESULTS ON THIS REPORT:: NORMAL
TYPE OF PREGNANCY:: NORMAL

## 2022-12-15 ENCOUNTER — APPOINTMENT (OUTPATIENT)
Dept: OBGYN | Facility: CLINIC | Age: 22
End: 2022-12-15

## 2022-12-20 ENCOUNTER — NON-APPOINTMENT (OUTPATIENT)
Age: 22
End: 2022-12-20

## 2022-12-23 ENCOUNTER — APPOINTMENT (OUTPATIENT)
Dept: OBGYN | Facility: CLINIC | Age: 22
End: 2022-12-23

## 2022-12-23 VITALS — DIASTOLIC BLOOD PRESSURE: 73 MMHG | WEIGHT: 153 LBS | SYSTOLIC BLOOD PRESSURE: 111 MMHG

## 2022-12-23 DIAGNOSIS — R05.9 COUGH, UNSPECIFIED: ICD-10-CM

## 2022-12-23 PROCEDURE — 99213 OFFICE O/P EST LOW 20 MIN: CPT | Mod: TH

## 2022-12-23 RX ORDER — GUAIFENESIN 100 MG/5ML
100 LIQUID ORAL EVERY 4 HOURS
Qty: 420 | Refills: 0 | Status: ACTIVE | COMMUNITY
Start: 2022-12-23 | End: 1900-01-01

## 2022-12-28 LAB
AFP MOM: 0.88
AFP VALUE: 41.7 NG/ML
ALPHA FETOPROTEIN SERUM COMMENT: NORMAL
ALPHA FETOPROTEIN SERUM INTERPRETATION: NORMAL
ALPHA FETOPROTEIN SERUM RESULTS: NORMAL
ALPHA FETOPROTEIN SERUM TEST RESULTS: NORMAL
GESTATIONAL AGE BASED ON: NORMAL
GESTATIONAL AGE ON COLLECTION DATE: 18.4 WEEKS
INSULIN DEP DIABETES: NO
MATERNAL AGE AT EDD AFP: 24.3 YR
MULTIPLE GESTATION: NO
OSBR RISK 1 IN: NORMAL
RACE: NORMAL
WEIGHT AFP: 153 LBS

## 2023-01-13 ENCOUNTER — APPOINTMENT (OUTPATIENT)
Dept: ANTEPARTUM | Facility: CLINIC | Age: 23
End: 2023-01-13
Payer: MEDICAID

## 2023-01-13 ENCOUNTER — APPOINTMENT (OUTPATIENT)
Dept: OBGYN | Facility: CLINIC | Age: 23
End: 2023-01-13
Payer: MEDICAID

## 2023-01-13 VITALS — DIASTOLIC BLOOD PRESSURE: 70 MMHG | WEIGHT: 152 LBS | SYSTOLIC BLOOD PRESSURE: 106 MMHG

## 2023-01-13 PROCEDURE — 76811 OB US DETAILED SNGL FETUS: CPT

## 2023-01-13 PROCEDURE — 99213 OFFICE O/P EST LOW 20 MIN: CPT | Mod: TH
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
## 2023-02-08 ENCOUNTER — APPOINTMENT (OUTPATIENT)
Dept: OBGYN | Facility: CLINIC | Age: 23
End: 2023-02-08
Payer: MEDICAID

## 2023-02-08 DIAGNOSIS — Z3A.24 24 WEEKS GESTATION OF PREGNANCY: ICD-10-CM

## 2023-02-08 PROCEDURE — 99212 OFFICE O/P EST SF 10 MIN: CPT | Mod: TH

## 2023-02-10 LAB
BASOPHILS # BLD AUTO: 0.05 K/UL
BASOPHILS NFR BLD AUTO: 0.5 %
EOSINOPHIL # BLD AUTO: 0.2 K/UL
EOSINOPHIL NFR BLD AUTO: 2.1 %
GLUCOSE 1H P 50 G GLC PO SERPL-MCNC: 140 MG/DL
HCT VFR BLD CALC: 33.8 %
HGB BLD-MCNC: 10.2 G/DL
IMM GRANULOCYTES NFR BLD AUTO: 0.5 %
LYMPHOCYTES # BLD AUTO: 1.96 K/UL
LYMPHOCYTES NFR BLD AUTO: 20.8 %
MAN DIFF?: NORMAL
MCHC RBC-ENTMCNC: 27.5 PG
MCHC RBC-ENTMCNC: 30.2 GM/DL
MCV RBC AUTO: 91.1 FL
MONOCYTES # BLD AUTO: 0.6 K/UL
MONOCYTES NFR BLD AUTO: 6.4 %
NEUTROPHILS # BLD AUTO: 6.57 K/UL
NEUTROPHILS NFR BLD AUTO: 69.7 %
PLATELET # BLD AUTO: 287 K/UL
RBC # BLD: 3.71 M/UL
RBC # FLD: 13.5 %
WBC # FLD AUTO: 9.43 K/UL

## 2023-02-10 RX ORDER — FERROUS SULFATE TAB EC 325 MG (65 MG FE EQUIVALENT) 325 (65 FE) MG
325 (65 FE) TABLET DELAYED RESPONSE ORAL DAILY
Qty: 30 | Refills: 6 | Status: ACTIVE | COMMUNITY
Start: 2023-02-10 | End: 1900-01-01

## 2023-02-10 RX ORDER — PRENATAL VIT,CAL 76/IRON/FOLIC 29 MG-1 MG
29-1 TABLET ORAL DAILY
Qty: 30 | Refills: 6 | Status: ACTIVE | COMMUNITY
Start: 2023-02-10 | End: 1900-01-01

## 2023-02-24 LAB
ESTIMATED AVERAGE GLUCOSE: 103 MG/DL
HBA1C MFR BLD HPLC: 5.2 %

## 2023-02-27 ENCOUNTER — NON-APPOINTMENT (OUTPATIENT)
Age: 23
End: 2023-02-27

## 2023-02-27 ENCOUNTER — OUTPATIENT (OUTPATIENT)
Dept: INPATIENT UNIT | Facility: HOSPITAL | Age: 23
LOS: 1 days | Discharge: HOME | End: 2023-02-27
Payer: MEDICAID

## 2023-02-27 VITALS — SYSTOLIC BLOOD PRESSURE: 98 MMHG | HEART RATE: 86 BPM | DIASTOLIC BLOOD PRESSURE: 54 MMHG

## 2023-02-27 VITALS
TEMPERATURE: 98 F | SYSTOLIC BLOOD PRESSURE: 112 MMHG | RESPIRATION RATE: 17 BRPM | HEART RATE: 96 BPM | DIASTOLIC BLOOD PRESSURE: 57 MMHG

## 2023-02-27 DIAGNOSIS — Z90.49 ACQUIRED ABSENCE OF OTHER SPECIFIED PARTS OF DIGESTIVE TRACT: Chronic | ICD-10-CM

## 2023-02-27 DIAGNOSIS — O26.899 OTHER SPECIFIED PREGNANCY RELATED CONDITIONS, UNSPECIFIED TRIMESTER: ICD-10-CM

## 2023-02-27 LAB
APPEARANCE UR: ABNORMAL
BACTERIA # UR AUTO: ABNORMAL
BILIRUB UR-MCNC: NEGATIVE — SIGNIFICANT CHANGE UP
COLOR SPEC: SIGNIFICANT CHANGE UP
DIFF PNL FLD: NEGATIVE — SIGNIFICANT CHANGE UP
EPI CELLS # UR: SIGNIFICANT CHANGE UP /HPF (ref 0–5)
GLUCOSE UR QL: NEGATIVE — SIGNIFICANT CHANGE UP
KETONES UR-MCNC: NEGATIVE — SIGNIFICANT CHANGE UP
LEUKOCYTE ESTERASE UR-ACNC: ABNORMAL
NITRITE UR-MCNC: NEGATIVE — SIGNIFICANT CHANGE UP
PH UR: 6 — SIGNIFICANT CHANGE UP (ref 5–8)
PROT UR-MCNC: NEGATIVE — SIGNIFICANT CHANGE UP
RBC CASTS # UR COMP ASSIST: 2 /HPF — SIGNIFICANT CHANGE UP (ref 0–4)
SP GR SPEC: 1.01 — SIGNIFICANT CHANGE UP (ref 1.01–1.05)
UROBILINOGEN FLD QL: SIGNIFICANT CHANGE UP
WBC UR QL: >50 /HPF — SIGNIFICANT CHANGE UP (ref 0–5)

## 2023-02-27 PROCEDURE — 83986 ASSAY PH BODY FLUID NOS: CPT | Mod: QW

## 2023-02-27 PROCEDURE — 99221 1ST HOSP IP/OBS SF/LOW 40: CPT | Mod: 25

## 2023-02-27 RX ORDER — CEPHALEXIN 500 MG
1 CAPSULE ORAL
Qty: 14 | Refills: 0
Start: 2023-02-27 | End: 2023-03-05

## 2023-02-27 NOTE — OB PROVIDER TRIAGE NOTE - ADDITIONAL INSTRUCTIONS
Keflex for UTI  terazol for yeast infection  urine culture and gonorrhea/chlamydia tests pending- expect follow up phone call if positive  follow up with OB  provider this week  increase fluid hydration.  reviewed fetal kick count and labor precautions  call OB/return to triage with change in symptoms and or concerns such as decreased fetal movement, leakage of fluid, vaginal bleeding, contractions, pain.

## 2023-02-27 NOTE — OB PROVIDER TRIAGE NOTE - PLAN OF CARE
dx: vaginal yeast infection, UTI, no evidence PPROM  d/w Dr craig: discharge home, follow up outpatient  Keflex for UTI  Terazol for yeast infection  urine culture and gonorrhea/chlamydia tests pending- expect follow up phone call if positive  follow up with OB  provider this week  increase fluid hydration.  reviewed fetal kick count and labor precautions  pt verbalized understanding to call OB/return to triage with change in symptoms and or concerns such as decreased fetal movement, leakage of fluid, vaginal bleeding, contractions, pain.  reviewed  labor precautions and discharge papers, pt verbalized understanding and signed.

## 2023-02-27 NOTE — OB RN TRIAGE NOTE - PRETERM DELIVERIES, OB PROFILE
Pt is calling because she states she needed to cancel her surgery and is wondering if everything has been cancelled. Pt states she keeps getting call to fit back brace and others about the surgery. Please call to discuss. Thank You   0

## 2023-02-27 NOTE — OB PROVIDER TRIAGE NOTE - NSOBPROVIDERNOTE_OBGYN_ALL_OB_FT
23 y/o female  @ 26.5GA with SLIUP, uncomplicated PNC as per pt, presents complaining of discharge and vaginal itchiness x 1 weeks.  PLAN: rule out PPROM, UTI    ordered UA, G&C urine test 25 y/o female  @ 26.5GA with SLIUP, uncomplicated PNC as per pt, presents complaining of discharge and vaginal itchiness x 1 weeks.  PLAN: rule out PPROM, UTI    ordered UA, G&C urine test  tracing reactive  TAS good fluids, good movement, FHR+  SSE yeast-like discharge, no LOF/pooling. nitrazine/ferning negative   ua large leuks, ordered culture     d/w Dr craig: discharge home, follow up outpatient  Keflex for UTI  terazol for yeast infection  urine culture and gonorrhea/chlamydia tests pending- expect follow up phone call if positive  follow up with OB  provider this week  increase fluid hydration.  reviewed fetal kick count and labor precautions  call OB/return to triage with change in symptoms and or concerns such as decreased fetal movement, leakage of fluid, vaginal bleeding, contractions, pain. 23 y/o female  @ 26.5GA with SLIUP, uncomplicated PNC as per pt, presents complaining of discharge and vaginal itchiness x 1 weeks.    ordered UA, G&C urine test  tracing reactive, non soila  TAS: good fluids, good movement, FHR+  SSE yeast-like discharge, no LOF/pooling. nitrazine/ferning negative   ua large leuks, ordered culture     dx: vaginal yeast infection, UTI, no evidence PPROM  d/w Dr craig: discharge home, follow up outpatient  Keflex for UTI  Terazol for yeast infection  pt verbalized understnaing to  prescriptions from her pharmacy  urine culture and gonorrhea/chlamydia tests pending- expect follow up phone call if positive  follow up with OB  provider this week - pt verbalized understanding to call in the AM  increase fluid hydration.  reviewed fetal kick count and labor precautions  pt verbalized understanding to call OB/return to triage with change in symptoms and or concerns such as decreased fetal movement, leakage of fluid, vaginal bleeding, contractions, pain.  reviewed  labor precautions and discharge papers, pt verbalized understanding and signed.    discharged @ 21:20

## 2023-02-27 NOTE — OB PROVIDER TRIAGE NOTE - NSHPLABSRESULTS_GEN_ALL_CORE
nitrazine negative  ferning negative    Urinalysis Basic - ( 2023 19:51 )    Color: Light Yellow / Appearance: Slightly Turbid / S.010 / pH: x  Gluc: x / Ketone: Negative  / Bili: Negative / Urobili: <2 mg/dL   Blood: x / Protein: Negative / Nitrite: Negative   Leuk Esterase: Large / RBC: 2 /HPF / WBC >50 /HPF   Sq Epi: x / Non Sq Epi: small /HPF / Bacteria: Few

## 2023-02-27 NOTE — OB PROVIDER TRIAGE NOTE - HISTORY OF PRESENT ILLNESS
23 y/o female  @ 26.5GA with SLIUP, uncomplicated PNC as per pt, presents complaining of discharge and vaginal itchiness x 1 weeks. Pt states she had about 3 episodes scant discharge, not needing a pad/liner and denies any gush of fluid. Describes discharge as white and watery. vaginal itchiness rated at 5/10 in pain, hasnt used any creams. denies contractions, abdominal pain, HA, N/V/D, fever, chills, urinary symptoms, history STDs, dizzy/lightheadedness. reports GFM.  PNC with Dr Asif, last apt 2/10/23. NExt apt in 2 weeks.    med hx: denies  surg hx: lap cholecystectomy 2022  Ob:  3/22/2022  Gyn: denies hx fibroids, abnormal pap smears, cysts, STDs, HSV  Allergies: NKDA  Medications: PNV  Psych: denies  Social: denies alc/drugs/tobacco    25 y/o female  @ 26.5GA with SLIUP, uncomplicated PNC as per pt, presents complaining of discharge and vaginal itchiness x 1 weeks. Pt states she had about 3 episodes scant discharge, not needing a pad/liner and denies any gush of fluid. Describes discharge as white and watery. vaginal itchiness rated at 5/10 in pain, hasnt used any creams. denies contractions, abdominal pain, cramp, HA, N/V/D, fever, chills, urinary symptoms, back pain, history STDs, dizzy/lightheadedness. reports GFM.  PNC with Dr Asif, last apt 2/10/23. Next apt in 2 weeks.    med hx: denies  surg hx: lap cholecystectomy 2022  Ob:  3/22/2022  Gyn: denies hx fibroids, abnormal pap smears, cysts, STDs, HSV  Allergies: NKDA  Medications: PNV  Psych: denies  Social: denies alc/drugs/tobacco    25 y/o female  @ 26.5GA with SLIUP, uncomplicated PNC as per pt, presents complaining of vaginal discharge and vaginal itchiness x 1 weeks. Pt states she had about 3 episodes scant discharge, not needing a pad/liner and denies any gush of fluid. Describes discharge as white and watery. vaginal itchiness rated at 5/10 in pain, hasnt used any creams. denies contractions, abdominal pain, cramp, HA, N/V/D, fever, chills, urinary symptoms, back pain, history STDs, dizzy/lightheadedness. reports GFM.  PNC with Dr Asif, last apt 2/10/23. Next apt in 2 weeks.    med hx: denies  surg hx: lap cholecystectomy 2022  Ob:  3/22/2022  Gyn: denies hx fibroids, abnormal pap smears, cysts, STDs, HSV  Allergies: NKDA  Medications: PNV  Psych: denies  Social: denies alc/drugs/tobacco

## 2023-02-27 NOTE — OB PROVIDER TRIAGE NOTE - NSHPPHYSICALEXAM_GEN_ALL_CORE
Vital Signs Last 24 Hrs  T(C): 36.5 (27 Feb 2023 18:44), Max: 36.5 (27 Feb 2023 18:12)  T(F): 97.7 (27 Feb 2023 18:44), Max: 97.7 (27 Feb 2023 18:12)  HR: 85 (27 Feb 2023 20:30) (82 - 96)  BP: 105/53 (27 Feb 2023 20:30) (88/52 - 115/66)  BP(mean): --  RR: 17 (27 Feb 2023 18:12) (17 - 17)  SpO2: --    gen: a/o x 3, comfortable   pulm: CTA b/l  cardio: RRR  abd: soft and nontender, gravid  SSE: no evidence LOF/pooling at external genitalia. labia appears slightly erythematous, non-tender to touch, no evidence lesions/outbreaks. increased yeast-like discharge on exam, cervix appears closed. No evidence LOF/pooling/bleeding in vaginal vault/at external os. collected GBS/FfN.  TAS: vertex by sono, posterior placenta, GFM, good fluid around baby MVP 4.79, FHR+  EFM: baseline 145 with mod variability, pos accels - reactive   TOCO: non-soila Vital Signs Last 24 Hrs  T(C): 36.5 (27 Feb 2023 18:44), Max: 36.5 (27 Feb 2023 18:12)  T(F): 97.7 (27 Feb 2023 18:44), Max: 97.7 (27 Feb 2023 18:12)  HR: 85 (27 Feb 2023 20:30) (82 - 96)  BP: 105/53 (27 Feb 2023 20:30) (88/52 - 115/66)  BP(mean): --  RR: 17 (27 Feb 2023 18:12) (17 - 17)  SpO2: --    gen: a/o x 3, comfortable   pulm: CTA b/l  cardio: RRR  abd: soft and nontender, gravid  negative CVA tenderness  SSE: no evidence LOF/pooling at external genitalia. labia appears slightly erythematous, non-tender to touch, no evidence lesions/outbreaks. increased yeast-like discharge on exam, cervix appears closed. No evidence LOF/pooling/bleeding in vaginal vault/at external os. instructed pt to cough and no LOF/pooling seen. collected GBS/FfN. negative nitrazine/ferning.  TAS: vertex by sono, posterior placenta, GFM, good fluid around baby w/ MVP 4.79, FHR+  EFM: baseline 145 with mod variability, pos accels - reactive   TOCO: non-soila

## 2023-02-27 NOTE — OB PROVIDER TRIAGE NOTE - NS_DISPOSITION_OBGYN_ALL_OB
Intake letter mailed with school age intake packet to the mailing address on file  Message will be deferred for 4 weeks  Continue to Observe Discharge

## 2023-02-28 DIAGNOSIS — L29.9 PRURITUS, UNSPECIFIED: ICD-10-CM

## 2023-02-28 DIAGNOSIS — Z3A.26 26 WEEKS GESTATION OF PREGNANCY: ICD-10-CM

## 2023-02-28 DIAGNOSIS — O23.592 INFECTION OF OTHER PART OF GENITAL TRACT IN PREGNANCY, SECOND TRIMESTER: ICD-10-CM

## 2023-02-28 DIAGNOSIS — Z90.49 ACQUIRED ABSENCE OF OTHER SPECIFIED PARTS OF DIGESTIVE TRACT: ICD-10-CM

## 2023-02-28 DIAGNOSIS — Z03.71 ENCOUNTER FOR SUSPECTED PROBLEM WITH AMNIOTIC CAVITY AND MEMBRANE RULED OUT: ICD-10-CM

## 2023-02-28 DIAGNOSIS — O23.42 UNSPECIFIED INFECTION OF URINARY TRACT IN PREGNANCY, SECOND TRIMESTER: ICD-10-CM

## 2023-02-28 DIAGNOSIS — L53.9 ERYTHEMATOUS CONDITION, UNSPECIFIED: ICD-10-CM

## 2023-02-28 DIAGNOSIS — O99.712 DISEASES OF THE SKIN AND SUBCUTANEOUS TISSUE COMPLICATING PREGNANCY, SECOND TRIMESTER: ICD-10-CM

## 2023-02-28 DIAGNOSIS — N39.0 URINARY TRACT INFECTION, SITE NOT SPECIFIED: ICD-10-CM

## 2023-02-28 DIAGNOSIS — B37.31 ACUTE CANDIDIASIS OF VULVA AND VAGINA: ICD-10-CM

## 2023-03-01 LAB
C TRACH RRNA SPEC QL NAA+PROBE: SIGNIFICANT CHANGE UP
CULTURE RESULTS: SIGNIFICANT CHANGE UP
N GONORRHOEA RRNA SPEC QL NAA+PROBE: SIGNIFICANT CHANGE UP
SPECIMEN SOURCE: SIGNIFICANT CHANGE UP

## 2023-03-02 ENCOUNTER — APPOINTMENT (OUTPATIENT)
Dept: OBGYN | Facility: CLINIC | Age: 23
End: 2023-03-02
Payer: MEDICAID

## 2023-03-02 VITALS — DIASTOLIC BLOOD PRESSURE: 67 MMHG | SYSTOLIC BLOOD PRESSURE: 100 MMHG | WEIGHT: 154 LBS

## 2023-03-02 PROCEDURE — 99213 OFFICE O/P EST LOW 20 MIN: CPT | Mod: TH,25

## 2023-03-02 PROCEDURE — 59025 FETAL NON-STRESS TEST: CPT

## 2023-03-07 ENCOUNTER — NON-APPOINTMENT (OUTPATIENT)
Age: 23
End: 2023-03-07

## 2023-03-10 ENCOUNTER — APPOINTMENT (OUTPATIENT)
Dept: OBGYN | Facility: CLINIC | Age: 23
End: 2023-03-10

## 2023-03-14 ENCOUNTER — NON-APPOINTMENT (OUTPATIENT)
Age: 23
End: 2023-03-14

## 2023-03-16 ENCOUNTER — APPOINTMENT (OUTPATIENT)
Dept: OBGYN | Facility: CLINIC | Age: 23
End: 2023-03-16
Payer: MEDICAID

## 2023-03-16 VITALS — SYSTOLIC BLOOD PRESSURE: 95 MMHG | WEIGHT: 154 LBS | DIASTOLIC BLOOD PRESSURE: 63 MMHG

## 2023-03-16 PROCEDURE — 99213 OFFICE O/P EST LOW 20 MIN: CPT | Mod: TH

## 2023-03-16 RX ORDER — HYDROCORTISONE 10 MG/G
1 OINTMENT TOPICAL TWICE DAILY
Qty: 1 | Refills: 1 | Status: ACTIVE | COMMUNITY
Start: 2023-03-16 | End: 1900-01-01

## 2023-03-24 ENCOUNTER — APPOINTMENT (OUTPATIENT)
Dept: OBGYN | Facility: CLINIC | Age: 23
End: 2023-03-24
Payer: MEDICAID

## 2023-03-24 VITALS
WEIGHT: 153 LBS | DIASTOLIC BLOOD PRESSURE: 67 MMHG | HEIGHT: 61 IN | BODY MASS INDEX: 28.89 KG/M2 | SYSTOLIC BLOOD PRESSURE: 106 MMHG

## 2023-03-24 DIAGNOSIS — N76.0 ACUTE VAGINITIS: ICD-10-CM

## 2023-03-24 PROCEDURE — 99213 OFFICE O/P EST LOW 20 MIN: CPT | Mod: TH

## 2023-03-24 RX ORDER — CLOTRIMAZOLE AND BETAMETHASONE DIPROPIONATE 10; .5 MG/G; MG/G
1-0.05 CREAM TOPICAL TWICE DAILY
Qty: 1 | Refills: 1 | Status: ACTIVE | COMMUNITY
Start: 2023-03-24 | End: 1900-01-01

## 2023-03-24 NOTE — DISCUSSION/SUMMARY
[FreeTextEntry1] : 24 y/o F at 30 weeks presenting with vaginitis \par -f/u affirm - therapy to be Rx as indicated\par -Will treat external irritation with clotrimazole, Rx sent\par -Vulvovaginal hygiene reviewed\par

## 2023-03-24 NOTE — PHYSICAL EXAM
[Appropriately responsive] : appropriately responsive [Alert] : alert [No Acute Distress] : no acute distress [Soft] : soft [Non-tender] : non-tender [Non-distended] : non-distended [No HSM] : No HSM [No Lesions] : no lesions [No Mass] : no mass [Oriented x3] : oriented x3 [Labia Majora] : normal [Labia Minora] : normal [Discharge] : a  ~M vaginal discharge was present [Normal] : normal [Uterine Adnexae] : normal [FreeTextEntry4] : yellow

## 2023-03-27 RX ORDER — FLUCONAZOLE 150 MG/1
150 TABLET ORAL
Qty: 2 | Refills: 0 | Status: ACTIVE | COMMUNITY
Start: 2023-03-27 | End: 1900-01-01

## 2023-03-27 RX ORDER — METRONIDAZOLE 7.5 MG/G
0.75 GEL VAGINAL
Qty: 1 | Refills: 0 | Status: ACTIVE | COMMUNITY
Start: 2023-03-27 | End: 1900-01-01

## 2023-03-28 LAB
CANDIDA VAG CYTO: DETECTED
G VAGINALIS+PREV SP MTYP VAG QL MICRO: DETECTED
T VAGINALIS VAG QL WET PREP: NOT DETECTED

## 2023-03-30 ENCOUNTER — APPOINTMENT (OUTPATIENT)
Dept: ANTEPARTUM | Facility: CLINIC | Age: 23
End: 2023-03-30

## 2023-03-30 ENCOUNTER — APPOINTMENT (OUTPATIENT)
Dept: OBGYN | Facility: CLINIC | Age: 23
End: 2023-03-30

## 2023-04-05 PROBLEM — Z67.91 BLOOD TYPE, RH NEGATIVE: Status: ACTIVE | Noted: 2022-01-14

## 2023-04-06 ENCOUNTER — APPOINTMENT (OUTPATIENT)
Dept: OBGYN | Facility: CLINIC | Age: 23
End: 2023-04-06
Payer: MEDICAID

## 2023-04-06 VITALS
HEIGHT: 61 IN | DIASTOLIC BLOOD PRESSURE: 62 MMHG | BODY MASS INDEX: 28.7 KG/M2 | SYSTOLIC BLOOD PRESSURE: 93 MMHG | WEIGHT: 152 LBS

## 2023-04-06 PROCEDURE — 99213 OFFICE O/P EST LOW 20 MIN: CPT | Mod: TH

## 2023-04-20 ENCOUNTER — APPOINTMENT (OUTPATIENT)
Dept: OBGYN | Facility: CLINIC | Age: 23
End: 2023-04-20
Payer: MEDICAID

## 2023-04-20 VITALS — SYSTOLIC BLOOD PRESSURE: 114 MMHG | DIASTOLIC BLOOD PRESSURE: 73 MMHG | WEIGHT: 152 LBS

## 2023-04-20 PROCEDURE — 99213 OFFICE O/P EST LOW 20 MIN: CPT | Mod: TH

## 2023-05-05 ENCOUNTER — APPOINTMENT (OUTPATIENT)
Dept: OBGYN | Facility: CLINIC | Age: 23
End: 2023-05-05
Payer: MEDICAID

## 2023-05-05 VITALS — DIASTOLIC BLOOD PRESSURE: 79 MMHG | SYSTOLIC BLOOD PRESSURE: 118 MMHG | WEIGHT: 151 LBS

## 2023-05-05 DIAGNOSIS — Z34.90 ENCOUNTER FOR SUPERVISION OF NORMAL PREGNANCY, UNSPECIFIED, UNSPECIFIED TRIMESTER: ICD-10-CM

## 2023-05-05 PROCEDURE — 99213 OFFICE O/P EST LOW 20 MIN: CPT | Mod: TH

## 2023-05-08 LAB
GP B STREP DNA SPEC QL NAA+PROBE: DETECTED
HIV1+2 AB SPEC QL IA.RAPID: NONREACTIVE
SOURCE GBS: NORMAL

## 2023-05-12 ENCOUNTER — APPOINTMENT (OUTPATIENT)
Dept: OBGYN | Facility: CLINIC | Age: 23
End: 2023-05-12
Payer: MEDICAID

## 2023-05-12 VITALS — SYSTOLIC BLOOD PRESSURE: 104 MMHG | DIASTOLIC BLOOD PRESSURE: 70 MMHG | WEIGHT: 157 LBS

## 2023-05-12 PROCEDURE — 99213 OFFICE O/P EST LOW 20 MIN: CPT | Mod: TH

## 2023-05-13 ENCOUNTER — INPATIENT (INPATIENT)
Facility: HOSPITAL | Age: 23
LOS: 1 days | Discharge: HOME | End: 2023-05-15
Attending: OBSTETRICS & GYNECOLOGY | Admitting: OBSTETRICS & GYNECOLOGY
Payer: MEDICAID

## 2023-05-13 ENCOUNTER — TRANSCRIPTION ENCOUNTER (OUTPATIENT)
Age: 23
End: 2023-05-13

## 2023-05-13 VITALS
SYSTOLIC BLOOD PRESSURE: 108 MMHG | HEART RATE: 92 BPM | RESPIRATION RATE: 16 BRPM | DIASTOLIC BLOOD PRESSURE: 68 MMHG | TEMPERATURE: 98 F

## 2023-05-13 DIAGNOSIS — O26.899 OTHER SPECIFIED PREGNANCY RELATED CONDITIONS, UNSPECIFIED TRIMESTER: ICD-10-CM

## 2023-05-13 DIAGNOSIS — Z90.49 ACQUIRED ABSENCE OF OTHER SPECIFIED PARTS OF DIGESTIVE TRACT: Chronic | ICD-10-CM

## 2023-05-13 LAB
BASOPHILS # BLD AUTO: 0.03 K/UL — SIGNIFICANT CHANGE UP (ref 0–0.2)
BASOPHILS NFR BLD AUTO: 0.3 % — SIGNIFICANT CHANGE UP (ref 0–2)
BLD GP AB SCN SERPL QL: NEGATIVE — SIGNIFICANT CHANGE UP
EOSINOPHIL # BLD AUTO: 0.06 K/UL — SIGNIFICANT CHANGE UP (ref 0–0.5)
EOSINOPHIL NFR BLD AUTO: 0.7 % — SIGNIFICANT CHANGE UP (ref 0–6)
HCT VFR BLD CALC: 32.4 % — LOW (ref 34.5–45)
HGB BLD-MCNC: 10.1 G/DL — LOW (ref 11.5–15.5)
IANC: 5.81 K/UL — SIGNIFICANT CHANGE UP (ref 1.8–7.4)
IMM GRANULOCYTES NFR BLD AUTO: 0.5 % — SIGNIFICANT CHANGE UP (ref 0–0.9)
LYMPHOCYTES # BLD AUTO: 1.97 K/UL — SIGNIFICANT CHANGE UP (ref 1–3.3)
LYMPHOCYTES # BLD AUTO: 22.6 % — SIGNIFICANT CHANGE UP (ref 13–44)
MCHC RBC-ENTMCNC: 24.5 PG — LOW (ref 27–34)
MCHC RBC-ENTMCNC: 31.2 GM/DL — LOW (ref 32–36)
MCV RBC AUTO: 78.5 FL — LOW (ref 80–100)
MONOCYTES # BLD AUTO: 0.81 K/UL — SIGNIFICANT CHANGE UP (ref 0–0.9)
MONOCYTES NFR BLD AUTO: 9.3 % — SIGNIFICANT CHANGE UP (ref 2–14)
NEUTROPHILS # BLD AUTO: 5.81 K/UL — SIGNIFICANT CHANGE UP (ref 1.8–7.4)
NEUTROPHILS NFR BLD AUTO: 66.6 % — SIGNIFICANT CHANGE UP (ref 43–77)
NRBC # BLD: 0 /100 WBCS — SIGNIFICANT CHANGE UP (ref 0–0)
NRBC # FLD: 0 K/UL — SIGNIFICANT CHANGE UP (ref 0–0)
PLATELET # BLD AUTO: 307 K/UL — SIGNIFICANT CHANGE UP (ref 150–400)
RBC # BLD: 4.13 M/UL — SIGNIFICANT CHANGE UP (ref 3.8–5.2)
RBC # FLD: 14.8 % — HIGH (ref 10.3–14.5)
RH IG SCN BLD-IMP: NEGATIVE — SIGNIFICANT CHANGE UP
WBC # BLD: 8.72 K/UL — SIGNIFICANT CHANGE UP (ref 3.8–10.5)
WBC # FLD AUTO: 8.72 K/UL — SIGNIFICANT CHANGE UP (ref 3.8–10.5)

## 2023-05-13 PROCEDURE — 59409 OBSTETRICAL CARE: CPT | Mod: U7,UB,GC

## 2023-05-13 RX ORDER — SODIUM CHLORIDE 9 MG/ML
1000 INJECTION, SOLUTION INTRAVENOUS ONCE
Refills: 0 | Status: DISCONTINUED | OUTPATIENT
Start: 2023-05-13 | End: 2023-05-13

## 2023-05-13 RX ORDER — BENZOCAINE 10 %
1 GEL (GRAM) MUCOUS MEMBRANE EVERY 6 HOURS
Refills: 0 | Status: DISCONTINUED | OUTPATIENT
Start: 2023-05-13 | End: 2023-05-15

## 2023-05-13 RX ORDER — DIPHENHYDRAMINE HCL 50 MG
25 CAPSULE ORAL EVERY 6 HOURS
Refills: 0 | Status: DISCONTINUED | OUTPATIENT
Start: 2023-05-13 | End: 2023-05-15

## 2023-05-13 RX ORDER — OXYTOCIN 10 UNIT/ML
41.67 VIAL (ML) INJECTION
Qty: 20 | Refills: 0 | Status: DISCONTINUED | OUTPATIENT
Start: 2023-05-13 | End: 2023-05-15

## 2023-05-13 RX ORDER — SODIUM CHLORIDE 9 MG/ML
500 INJECTION INTRAMUSCULAR; INTRAVENOUS; SUBCUTANEOUS ONCE
Refills: 0 | Status: DISCONTINUED | OUTPATIENT
Start: 2023-05-13 | End: 2023-05-13

## 2023-05-13 RX ORDER — IBUPROFEN 200 MG
600 TABLET ORAL EVERY 6 HOURS
Refills: 0 | Status: COMPLETED | OUTPATIENT
Start: 2023-05-13 | End: 2024-04-10

## 2023-05-13 RX ORDER — SODIUM CHLORIDE 9 MG/ML
3 INJECTION INTRAMUSCULAR; INTRAVENOUS; SUBCUTANEOUS EVERY 8 HOURS
Refills: 0 | Status: DISCONTINUED | OUTPATIENT
Start: 2023-05-13 | End: 2023-05-15

## 2023-05-13 RX ORDER — LANOLIN
1 OINTMENT (GRAM) TOPICAL EVERY 6 HOURS
Refills: 0 | Status: DISCONTINUED | OUTPATIENT
Start: 2023-05-13 | End: 2023-05-15

## 2023-05-13 RX ORDER — SIMETHICONE 80 MG/1
80 TABLET, CHEWABLE ORAL EVERY 4 HOURS
Refills: 0 | Status: DISCONTINUED | OUTPATIENT
Start: 2023-05-13 | End: 2023-05-15

## 2023-05-13 RX ORDER — SODIUM CHLORIDE 9 MG/ML
1000 INJECTION, SOLUTION INTRAVENOUS ONCE
Refills: 0 | Status: COMPLETED | OUTPATIENT
Start: 2023-05-13 | End: 2023-05-13

## 2023-05-13 RX ORDER — CHLORHEXIDINE GLUCONATE 213 G/1000ML
1 SOLUTION TOPICAL DAILY
Refills: 0 | Status: DISCONTINUED | OUTPATIENT
Start: 2023-05-13 | End: 2023-05-13

## 2023-05-13 RX ORDER — OXYTOCIN 10 UNIT/ML
333.33 VIAL (ML) INJECTION
Qty: 20 | Refills: 0 | Status: DISCONTINUED | OUTPATIENT
Start: 2023-05-13 | End: 2023-05-13

## 2023-05-13 RX ORDER — OXYTOCIN 10 UNIT/ML
2 VIAL (ML) INJECTION
Qty: 20 | Refills: 0 | Status: DISCONTINUED | OUTPATIENT
Start: 2023-05-13 | End: 2023-05-13

## 2023-05-13 RX ORDER — TETANUS TOXOID, REDUCED DIPHTHERIA TOXOID AND ACELLULAR PERTUSSIS VACCINE, ADSORBED 5; 2.5; 8; 8; 2.5 [IU]/.5ML; [IU]/.5ML; UG/.5ML; UG/.5ML; UG/.5ML
0.5 SUSPENSION INTRAMUSCULAR ONCE
Refills: 0 | Status: DISCONTINUED | OUTPATIENT
Start: 2023-05-13 | End: 2023-05-15

## 2023-05-13 RX ORDER — OXYTOCIN 10 UNIT/ML
2 VIAL (ML) INJECTION
Qty: 30 | Refills: 0 | Status: DISCONTINUED | OUTPATIENT
Start: 2023-05-13 | End: 2023-05-13

## 2023-05-13 RX ORDER — AER TRAVELER 0.5 G/1
1 SOLUTION RECTAL; TOPICAL EVERY 4 HOURS
Refills: 0 | Status: DISCONTINUED | OUTPATIENT
Start: 2023-05-13 | End: 2023-05-15

## 2023-05-13 RX ORDER — OXYCODONE HYDROCHLORIDE 5 MG/1
5 TABLET ORAL
Refills: 0 | Status: DISCONTINUED | OUTPATIENT
Start: 2023-05-13 | End: 2023-05-15

## 2023-05-13 RX ORDER — SODIUM CHLORIDE 9 MG/ML
1000 INJECTION, SOLUTION INTRAVENOUS
Refills: 0 | Status: DISCONTINUED | OUTPATIENT
Start: 2023-05-13 | End: 2023-05-13

## 2023-05-13 RX ORDER — SODIUM CHLORIDE 9 MG/ML
1000 INJECTION INTRAMUSCULAR; INTRAVENOUS; SUBCUTANEOUS
Refills: 0 | Status: DISCONTINUED | OUTPATIENT
Start: 2023-05-13 | End: 2023-05-13

## 2023-05-13 RX ORDER — ACETAMINOPHEN 500 MG
975 TABLET ORAL
Refills: 0 | Status: DISCONTINUED | OUTPATIENT
Start: 2023-05-13 | End: 2023-05-15

## 2023-05-13 RX ORDER — MAGNESIUM HYDROXIDE 400 MG/1
30 TABLET, CHEWABLE ORAL
Refills: 0 | Status: DISCONTINUED | OUTPATIENT
Start: 2023-05-13 | End: 2023-05-15

## 2023-05-13 RX ORDER — AMPICILLIN TRIHYDRATE 250 MG
1 CAPSULE ORAL EVERY 4 HOURS
Refills: 0 | Status: DISCONTINUED | OUTPATIENT
Start: 2023-05-13 | End: 2023-05-13

## 2023-05-13 RX ORDER — KETOROLAC TROMETHAMINE 30 MG/ML
30 SYRINGE (ML) INJECTION ONCE
Refills: 0 | Status: DISCONTINUED | OUTPATIENT
Start: 2023-05-13 | End: 2023-05-14

## 2023-05-13 RX ORDER — OXYCODONE HYDROCHLORIDE 5 MG/1
5 TABLET ORAL ONCE
Refills: 0 | Status: DISCONTINUED | OUTPATIENT
Start: 2023-05-13 | End: 2023-05-15

## 2023-05-13 RX ORDER — DIPHENHYDRAMINE HCL 50 MG
25 CAPSULE ORAL ONCE
Refills: 0 | Status: COMPLETED | OUTPATIENT
Start: 2023-05-13 | End: 2023-05-13

## 2023-05-13 RX ORDER — PRAMOXINE HYDROCHLORIDE 150 MG/15G
1 AEROSOL, FOAM RECTAL EVERY 4 HOURS
Refills: 0 | Status: DISCONTINUED | OUTPATIENT
Start: 2023-05-13 | End: 2023-05-15

## 2023-05-13 RX ORDER — HYDROCORTISONE 1 %
1 OINTMENT (GRAM) TOPICAL EVERY 6 HOURS
Refills: 0 | Status: DISCONTINUED | OUTPATIENT
Start: 2023-05-13 | End: 2023-05-15

## 2023-05-13 RX ORDER — AMPICILLIN TRIHYDRATE 250 MG
2 CAPSULE ORAL ONCE
Refills: 0 | Status: COMPLETED | OUTPATIENT
Start: 2023-05-13 | End: 2023-05-13

## 2023-05-13 RX ORDER — DIBUCAINE 1 %
1 OINTMENT (GRAM) RECTAL EVERY 6 HOURS
Refills: 0 | Status: DISCONTINUED | OUTPATIENT
Start: 2023-05-13 | End: 2023-05-15

## 2023-05-13 RX ADMIN — Medication 25 MILLIGRAM(S): at 20:13

## 2023-05-13 RX ADMIN — SODIUM CHLORIDE 125 MILLILITER(S): 9 INJECTION, SOLUTION INTRAVENOUS at 16:55

## 2023-05-13 RX ADMIN — SODIUM CHLORIDE 1000 MILLILITER(S): 9 INJECTION, SOLUTION INTRAVENOUS at 16:30

## 2023-05-13 RX ADMIN — Medication 200 GRAM(S): at 16:20

## 2023-05-13 RX ADMIN — Medication 2 MILLIUNIT(S)/MIN: at 19:19

## 2023-05-13 RX ADMIN — Medication 108 GRAM(S): at 20:13

## 2023-05-13 NOTE — DISCHARGE NOTE OB - CARE PROVIDER_API CALL
Danisha Francis; MPH)  Angelita BAPTISTE  [address], [address]  [place] [address]  Phone: [phone]  Fax: [phone]  Follow Up Time:

## 2023-05-13 NOTE — DISCHARGE NOTE OB - MEDICATION SUMMARY - MEDICATIONS TO TAKE
I will START or STAY ON the medications listed below when I get home from the hospital:    ibuprofen 600 mg oral tablet  -- 1 tab(s) by mouth every 6 hours  -- Indication: For  (normal spontaneous vaginal delivery)    acetaminophen 325 mg oral tablet  -- 3 tab(s) by mouth every 8 hours  -- Indication: For  (normal spontaneous vaginal delivery)    Prenatal Multivitamins with Folic Acid 1 mg oral tablet  -- 1 tab(s) by mouth once a day  -- Indication: For  (normal spontaneous vaginal delivery)

## 2023-05-13 NOTE — OB PROVIDER DELIVERY SUMMARY - NSSELHIDDEN_OBGYN_ALL_OB_FT
[NS_DeliveryAttending1_OBGYN_ALL_OB_FT:[id number]==],[NS_DeliveryRN_OBGYN_ALL_OB_FT:[id number]]

## 2023-05-13 NOTE — OB RN DELIVERY SUMMARY - NSSELHIDDEN_OBGYN_ALL_OB_FT
[NS_DeliveryAttending1_OBGYN_ALL_OB_FT:[id number]==],[NS_DeliveryRN_OBGYN_ALL_OB_FT:[id number]=]

## 2023-05-13 NOTE — OB PROVIDER H&P - ASSESSMENT
24yo female  @ 37.3 wks SLIUP uncomp PNC here in labor   -GBS positive; ampicillin ordered  -VE-5.5/70/-3  -pt was dw Dr Jamison  -pt was admitted for labor  -pt unsure if she wants epidural  -PNRs were reviewed

## 2023-05-13 NOTE — DISCHARGE NOTE OB - CARE PLAN
1 Principal Discharge DX:	Vaginal delivery  Assessment and plan of treatment:	pelvic rest  follow up with Dr. Arce in 6 weeks

## 2023-05-13 NOTE — OB PROVIDER H&P - HISTORY OF PRESENT ILLNESS
24yo female  @ 37.3 wks SLIUP uncomp PNC here complaining of ctx's Q5 min since 4am with decreased FM since this morning. Pt denies VB/ LOF.      AP course significant for GBS positive as of 2023    Pmhx- denies  Pshx/Ehcb-[id number] cholecystectomy  Meds- PNV  NKDA  Past ob-3/22/2022-6#0  FT complicated by GDM  Gyn- denies fibroids/ STD's/ ovarian cysts  Soc- denies depression/anxiety; denies smoking/ drug use

## 2023-05-13 NOTE — OB PROVIDER H&P - NSVAGDELIVDETA1_OBGYN_ALL_OB
Spontaneous vertex Libtayo Pregnancy And Lactation Text: This medication is contraindicated in pregnancy and when breast feeding.

## 2023-05-13 NOTE — OB PROVIDER H&P - NSLOWPPHRISK_OBGYN_A_OB
No previous uterine incision/Blood Pregnancy/Less than or equal to 4 previous vaginal births/No known bleeding disorder/No history of postpartum hemorrhage/No other PPH risks indicated

## 2023-05-13 NOTE — OB PROVIDER TRIAGE NOTE - NSHPPHYSICALEXAM_GEN_ALL_CORE
ICU Vital Signs Last 24 Hrs  T(C): 36.8 (13 May 2023 15:22), Max: 36.8 (13 May 2023 14:57)  T(F): 98.24 (13 May 2023 15:22), Max: 98.24 (13 May 2023 15:22)  HR: 85 (13 May 2023 15:46) (82 - 92)  BP: 116/56 (13 May 2023 15:46) (104/66 - 116/56)  BP(mean): --  ABP: --  ABP(mean): --  RR: 16 (13 May 2023 14:57) (16 - 16)  SpO2: --    Gen: A&O x 3; uncomfortable with ctx's    Pulm- breathing is unlabored  Abd exam- soft and nontender  Extremities- full range of motion with no weakness    NST cat I with 160 baseline with moderate variability; ctx's Q5 min  GBS positive 5/5/2023  VE-5.5/70/-3    Limited BPP reveals a vtx fetus; MVP-4.07; posterior placenta

## 2023-05-13 NOTE — DISCHARGE NOTE OB - MATERIALS PROVIDED
Vaccinations/[facility]  Screening Program/  Immunization Record/Guide to Postpartum Care/[facility] Hearing Screen Program/Shaken Baby Prevention Handout/Birth Certificate Instructions/Tdap Vaccination (VIS Pub Date: 2012)

## 2023-05-13 NOTE — OB RN DELIVERY SUMMARY - NS_SEPSISRSKCALC_OBGYN_ALL_OB_FT
EOS calculated successfully. EOS Risk Factor: 0.5/1000 live births ([facility] national incidence); GA=37w3d; Temp=98.78; ROM=1.65; GBS='Positive'; Antibiotics='GBS specific antibiotics > 2 hrs prior to birth'

## 2023-05-13 NOTE — OB RN PATIENT PROFILE - NS_CONTRACTPATTER_OBGYN_ALL_OB
#"eyes rolled back", post ictal state suggestive of seizure  #recent brain sx (glioma) s/p craniotomy  -S/P keppra 1000mg IV BID; switched to keppra 500mg in AM and 1000mg PO QHS  -prn ativan for seizure activities   -CT head noted  -EEG noted  -neurosurg consult appreciated - -No acute neurosurgical intervention  -neuro consult appreciated - If patient develops Side effects from keppra can consider changing to Depakote 500 mg q 12hr.    #Acute Stroke  -transfer to [facility] (9/8)  -MRI of Brain - Acute infarction involving the left posterior temporal-lateral occipital   -Neuro checks  -restart ASA and plavix   -started on Statin  -PT/OT  -FU Hypercoagulable work up    #Anemia of acute blood loss 2ndry to Epistaxis and GI bleed (guaiac positive)   -H/H 14.2 on 8/24, during hospital stay patients hgb dropped to 7.1  -transfused 2 units of PRBC (9/7)  -serial H/H monitoring and transfuse PRN  -protonix IV BID  -resume plavix and ASA  -ENT consult appreciated   -GI consult appreciated - Will need EGD, timing to be determined based on clinical course  -check iron panel, ferritin    #hypotension - resolved  -likely from anemia  -iv hydration  -home BP meds initially held (losartan 50mg BID, HCTZ 25mg day, Toprol XL 100mg BID)    #HTN, HLD  -resume Toprol XL 100mg BID and losartan 50mg po QD  -patients home BP meds (losartan 50mg BID, HCTZ 25mg day, Toprol XL 100mg BID)  -cont statin    #leukocytosis - Resolved   -likely from recent brain surgery and on steroids  -monitor CBC  -UA - neg  -CXR - clear    #TIA  -continue ASA and plavix    #DVT ppx  -SCDs Irregular

## 2023-05-13 NOTE — OB PROVIDER DELIVERY SUMMARY - NSPROVIDERDELIVERYNOTE_OBGYN_ALL_OB_FT
viable male. INfant delivered atraumatically, nuchal cord x 1, nose and mouth bulb suctioned, delayed cord clamping.  Infant placed on mothers chest. Placenta delivered spontaneously and intact. FIrst degree laceration repaired with 2-0 chromic.  Good hemostasis noted.   LETTY Jamison MD

## 2023-05-13 NOTE — OB RN PATIENT PROFILE - NSICDXPASTMEDICALHX_GEN_ALL_CORE_FT
PAST MEDICAL HISTORY:  2019 novel coronavirus disease (COVID-19)      (normal spontaneous vaginal delivery)

## 2023-05-13 NOTE — DISCHARGE NOTE OB - PATIENT PORTAL LINK FT
You can access the FollowMyHealth Patient Portal offered by [facility] by registering at the following website: [email or web address]. By joining [employer] [facility]’s FollowMyHealth portal, you will also be able to view your health information using other applications (apps) compatible with our system.

## 2023-05-13 NOTE — OB RN PATIENT PROFILE - FALL HARM RISK - UNIVERSAL INTERVENTIONS
Bed in lowest position, wheels locked, appropriate side rails in place/Call bell, personal items and telephone in reach/Instruct patient to call for assistance before getting out of bed or chair/Non-slip footwear when patient is out of bed/[place] to call system/Physically safe environment - no spills, clutter or unnecessary equipment/Purposeful Proactive Rounding/Room/bathroom lighting operational, light cord in reach

## 2023-05-13 NOTE — OB PROVIDER TRIAGE NOTE - HISTORY OF PRESENT ILLNESS
22yo female  @ 37.3 wks SLIUP uncomp PNC here complaining of ctx's Q5 min since 4am with decreased FM since this morning. Pt denies VB/ LOF.      AP course significant for GBS positive as of 2023    Pmhx- denies  Pshx/Eivs-[id number] cholecystectomy  Meds- PNV  NKDA  Past ob-3/22/2022-6#0  FT complicated by GDM  Gyn- denies fibroids/ STD's/ ovarian cysts  Soc- denies depression/anxiety; denies smoking/ drug use

## 2023-05-13 NOTE — OB PROVIDER TRIAGE NOTE - NSOBPROVIDERNOTE_OBGYN_ALL_OB_FT
22yo female  @ 37.3 wks SLIUP uncomp PNC here in labor   -GBS positive; ampicillin ordered  -VE-5.5/70/-3  -pt was dw Dr Jamison  -pt was admitted for labor  -pt unsure if she wants epidural  -PNRs were reviewed

## 2023-05-13 NOTE — DISCHARGE NOTE OB - MEDICATION SUMMARY - MEDICATIONS TO STOP TAKING
I will STOP taking the medications listed below when I get home from the hospital:    cephalexin 500 mg oral capsule  -- 1 cap(s) by mouth 2 times a day   -- Finish all this medication unless otherwise directed by prescriber.    terconazole 0.4% vaginal cream  -- 1 application intravaginally once a day (at bedtime)   -- For vaginal use.

## 2023-05-13 NOTE — OB RN TRIAGE NOTE - FALL HARM RISK - UNIVERSAL INTERVENTIONS
Bed in lowest position, wheels locked, appropriate side rails in place/Call bell, personal items and telephone in reach/Instruct patient to call for assistance before getting out of bed or chair/Non-slip footwear when patient is out of bed/[place] to call system/Physically safe environment - no spills, clutter or unnecessary equipment/Purposeful Proactive Rounding/Room/bathroom lighting operational, light cord in reach

## 2023-05-13 NOTE — OB NEONATOLOGY/PEDIATRICIAN DELIVERY SUMMARY - NSPEDSNEONOTESA_OBGYN_ALL_OB_FT
Peds called to [facility]DR for category II tracing. 37.3 wk SGA male born via  to a 24 y/o  mother.  No significant maternal or prenatal history. Maternal labs include Blood Type AB- (received rhogam at 28 weeks) , HIV - , RPR NR , Rubella I , Hep B - , GBS + on  (received amp x2). AROM at  on  with clear fluids (ROM hours: 1H 30M).  Baby emerged vigorous, crying, was w/d/s/s with APGARS of 9/9. Resuscitation included: routine  care. Mom plans to formula feed, declines Hep B vaccine and consents circ.  Highest maternal temp: 37.1. EOS 0.08.    : 22  TOB: 2139  Weight: 2390    Physical Exam:  Gen: no acute distress, +grimace  HEENT:  anterior fontanel open soft and flat, nondysmorphic facies, no cleft lip/palate, ears normal set, no ear pits or tags, nares clinically patent  Resp: Normal respiratory effort without grunting or retractions, good air entry b/l, clear to auscultation bilaterally  Cardio: Present S1/S2, regular rate and rhythm, no murmurs  Abd: soft, non tender, non distended, umbilical cord with 3 vessels  Neuro: +palmar and plantar grasp, +suck, +rox, normal tone  Extremities: negative mccormick and ortolani maneuvers, moving all extremities, no clavicular crepitus or stepoff  Skin: pink, warm  Genitals: Normal male anatomy, testicles palpable in scrotum b/l, Tenzin 1, anus patent

## 2023-05-14 LAB
COVID-19 SPIKE DOMAIN AB INTERP: POSITIVE
COVID-19 SPIKE DOMAIN ANTIBODY RESULT: >250 U/ML — HIGH
SARS-COV-2 IGG+IGM SERPL QL IA: >250 U/ML — HIGH
SARS-COV-2 IGG+IGM SERPL QL IA: POSITIVE

## 2023-05-14 RX ORDER — IBUPROFEN 200 MG
1 TABLET ORAL
Qty: 0 | Refills: 0 | DISCHARGE
Start: 2023-05-14

## 2023-05-14 RX ORDER — ACETAMINOPHEN 500 MG
3 TABLET ORAL
Qty: 0 | Refills: 0 | DISCHARGE
Start: 2023-05-14

## 2023-05-14 RX ORDER — IBUPROFEN 200 MG
600 TABLET ORAL EVERY 6 HOURS
Refills: 0 | Status: DISCONTINUED | OUTPATIENT
Start: 2023-05-14 | End: 2023-05-15

## 2023-05-14 RX ADMIN — Medication 30 MILLIGRAM(S): at 02:43

## 2023-05-14 RX ADMIN — Medication 975 MILLIGRAM(S): at 08:57

## 2023-05-14 RX ADMIN — Medication 600 MILLIGRAM(S): at 19:28

## 2023-05-14 RX ADMIN — Medication 975 MILLIGRAM(S): at 20:23

## 2023-05-14 RX ADMIN — Medication 600 MILLIGRAM(S): at 12:50

## 2023-05-14 RX ADMIN — Medication 600 MILLIGRAM(S): at 18:40

## 2023-05-14 RX ADMIN — Medication 30 MILLIGRAM(S): at 03:15

## 2023-05-14 RX ADMIN — SODIUM CHLORIDE 3 MILLILITER(S): 9 INJECTION INTRAMUSCULAR; INTRAVENOUS; SUBCUTANEOUS at 22:16

## 2023-05-14 RX ADMIN — Medication 600 MILLIGRAM(S): at 23:41

## 2023-05-14 RX ADMIN — Medication 975 MILLIGRAM(S): at 21:05

## 2023-05-14 RX ADMIN — SODIUM CHLORIDE 3 MILLILITER(S): 9 INJECTION INTRAMUSCULAR; INTRAVENOUS; SUBCUTANEOUS at 14:00

## 2023-05-14 RX ADMIN — Medication 975 MILLIGRAM(S): at 16:25

## 2023-05-14 RX ADMIN — SODIUM CHLORIDE 3 MILLILITER(S): 9 INJECTION INTRAMUSCULAR; INTRAVENOUS; SUBCUTANEOUS at 06:03

## 2023-05-14 RX ADMIN — Medication 600 MILLIGRAM(S): at 11:56

## 2023-05-14 RX ADMIN — Medication 975 MILLIGRAM(S): at 09:50

## 2023-05-14 RX ADMIN — Medication 975 MILLIGRAM(S): at 15:25

## 2023-05-14 NOTE — PROGRESS NOTE ADULT - SUBJECTIVE AND OBJECTIVE BOX
S:  Pt seen and examined for cervical change for variable decels   comfortable with epidural        O:   T(C): 37.1 (17:57)  HR: 72 (20:03)  BP: 104/63 (19:52)  RR: 16 (17:57)  SpO2: 99% (20:03)  SVE: 6/80/-2 AROM clear fluid IUPC placed          A/P: 23y admitted for labor  -Continue current management  -amnioinfusion prn   -Anticipate BRYCE Jamison MD
Pt without complaints. Lochia decreasing  Vital Signs Last 24 Hrs  T(C): 36.7 (14 May 2023 06:27), Max: 37.1 (13 May 2023 17:57)  T(F): 98 (14 May 2023 06:27), Max: 98.78 (13 May 2023 17:57)  HR: 69 (14 May 2023 06:27) (60 - 98)  BP: 101/61 (14 May 2023 06:27) (49/34 - 162/87)  BP(mean): --  RR: 18 (14 May 2023 06:27) (16 - 18)  SpO2: 100% (14 May 2023 06:27) (90% - 100%)    Parameters below as of 14 May 2023 06:27  Patient On (Oxygen Delivery Method): room air    Blood Type: AB Negative    abdomen soft, non-tender, fundus firm  extremities non tender  s/p nsd  doing well  monitor bps  rhogam if indicated  d/c home in am  
S:  Pt seen and examined for cervical change     comfortable with epidural     O:   T(C): 37.1 (17:57)  HR: 73 (18:43)  BP: 100/61 (18:38)  RR: 16 (17:57)  SpO2: 100% (18:43)  SVE: 6/70/-2        A/P: 23y admitted for labor  -Continue current management  -Anticipate   -pitocin   -plan to arom after 4 hours opal Jamison MD

## 2023-05-15 VITALS
RESPIRATION RATE: 20 BRPM | OXYGEN SATURATION: 100 % | SYSTOLIC BLOOD PRESSURE: 110 MMHG | DIASTOLIC BLOOD PRESSURE: 62 MMHG | TEMPERATURE: 98 F | HEART RATE: 72 BPM

## 2023-05-15 LAB
KLEIHAUER-BETKE CALCULATION: 0 % — SIGNIFICANT CHANGE UP
T PALLIDUM AB TITR SER: NEGATIVE — SIGNIFICANT CHANGE UP

## 2023-05-15 RX ADMIN — Medication 600 MILLIGRAM(S): at 11:57

## 2023-05-15 RX ADMIN — SODIUM CHLORIDE 3 MILLILITER(S): 9 INJECTION INTRAMUSCULAR; INTRAVENOUS; SUBCUTANEOUS at 05:20

## 2023-05-15 RX ADMIN — Medication 600 MILLIGRAM(S): at 12:40

## 2023-05-15 RX ADMIN — Medication 600 MILLIGRAM(S): at 06:00

## 2023-05-15 RX ADMIN — Medication 600 MILLIGRAM(S): at 00:10

## 2023-05-15 RX ADMIN — Medication 975 MILLIGRAM(S): at 02:40

## 2023-05-15 RX ADMIN — Medication 975 MILLIGRAM(S): at 10:22

## 2023-05-15 RX ADMIN — Medication 975 MILLIGRAM(S): at 02:07

## 2023-05-15 RX ADMIN — Medication 975 MILLIGRAM(S): at 11:00

## 2023-05-15 RX ADMIN — Medication 600 MILLIGRAM(S): at 05:20

## 2023-05-19 ENCOUNTER — APPOINTMENT (OUTPATIENT)
Dept: ANTEPARTUM | Facility: CLINIC | Age: 23
End: 2023-05-19

## 2023-05-19 ENCOUNTER — APPOINTMENT (OUTPATIENT)
Dept: OBGYN | Facility: CLINIC | Age: 23
End: 2023-05-19

## 2023-05-19 NOTE — OB PROVIDER DELIVERY SUMMARY - NS_ADMITROM_OBGYN_ALL_OB
Subjective:       Patient ID: Lourdes Marcus No

## 2023-05-23 PROBLEM — U07.1 COVID-19: Chronic | Status: ACTIVE | Noted: 2023-05-13

## 2023-05-26 ENCOUNTER — APPOINTMENT (OUTPATIENT)
Dept: OBGYN | Facility: CLINIC | Age: 23
End: 2023-05-26

## 2023-06-02 ENCOUNTER — APPOINTMENT (OUTPATIENT)
Dept: ANTEPARTUM | Facility: CLINIC | Age: 23
End: 2023-06-02

## 2023-06-02 ENCOUNTER — APPOINTMENT (OUTPATIENT)
Dept: OBGYN | Facility: CLINIC | Age: 23
End: 2023-06-02

## 2023-06-14 NOTE — OB RN TRIAGE NOTE - FALL HARM RISK - FALL HARM RISK
No indicators present Melolabial Interpolation Flap Division And Inset Text: Division and inset of the melolabial interpolation flap was performed to achieve optimal aesthetic result, restore normal anatomic appearance and avoid distortion of normal anatomy, expedite and facilitate wound healing, achieve optimal functional result and because linear closure either not possible or would produce suboptimal result. The patient was prepped and draped in the usual manner. The pedicle was infiltrated with local anesthesia. The pedicle was sectioned with a #15 blade. The pedicle was de-bulked and trimmed to match the shape of the defect. Hemostasis was achieved. The flap donor site and free margin of the flap were secured with deep buried sutures and the wound edges were re-approximated.

## 2023-06-23 ENCOUNTER — APPOINTMENT (OUTPATIENT)
Dept: OBGYN | Facility: CLINIC | Age: 23
End: 2023-06-23
Payer: MEDICAID

## 2023-06-23 VITALS
SYSTOLIC BLOOD PRESSURE: 89 MMHG | WEIGHT: 150 LBS | DIASTOLIC BLOOD PRESSURE: 56 MMHG | HEIGHT: 61 IN | BODY MASS INDEX: 28.32 KG/M2

## 2023-06-23 PROCEDURE — 99213 OFFICE O/P EST LOW 20 MIN: CPT | Mod: TH

## 2023-06-24 NOTE — HISTORY OF PRESENT ILLNESS
[Postpartum Follow Up] : postpartum follow up [Complications:___] : no complications [Delivery Date: ___] : on [unfilled] [] : delivered by vaginal delivery [Male] : Delivery History: baby boy [Wt. ___] : weighing [unfilled] [Breastfeeding] : currently nursing [Back to Normal] : is back to normal in size [None] : no vaginal bleeding [Normal] : the vagina was normal [Healing Well] : is healing well [Examination Of The Breasts] : breasts are normal [Doing Well] : is doing well

## 2023-09-28 NOTE — LACTATION INITIAL EVALUATION - AS EVIDENCED BY
REFERRING PHYSICIAN: Terrence Lamb MD    DATE:  09/28/2023    PREOPERATIVE DIAGNOSIS:  Chronic tonsillitis, tonsil and adenoid hypertrophy.    POSTOPERATIVE DIAGNOSIS:  Chronic tonsillitis, tonsil and adenoid hypertrophy.    PROCEDURES:  Tonsillectomy and adenoidectomy.    ANESTHESIA:  General endotracheal anesthesia.    ESTIMATED BLOOD LOSS:  5.    COMPLICATIONS:  None.    SPECIMENS:  Tonsils.    DISPOSITION:  Extubated and transferred to the recovery room in stable condition.    DESCRIPTION OF PROCEDURE:  The patient was brought to the operating room.  Appropriate monitors were placed.  The patient underwent general endotracheal anesthesia with no complications.  Marlo-Karl retractor was inserted into the oral cavity.  Right tonsil was grasped, retracted medially.  Coblation was used to dissect the tonsil off the tonsillar fossa.  Posterior pillar was preserved.  Bleeding was stopped with Coblation.  Next, the opposite tonsil was grasped, retracted medially.  Coblation was used to dissect the tonsil off the tonsillar fossa.  Posterior pillars were preserved.  Bleeding was stopped with Coblation.  Red rubber catheter was inserted in nose, withdrawn through the mouth in order to suspend the soft palate.  Suction cautery was used to perform an adenoidectomy.  Once the adenoids were removed, the choanae were widely patent.  The     stomach was suctioned.  Marlo-Karl retractor was removed.  The patient was extubated and transferred to the recovery room in stable condition.        Dictated By:  Terrence Lamb MD        D:  09/28/2023 10:12:00  T:  09/28/2023 12:28:31  [facility]/modl  Job:  [id number]      cc:  Terrence Lamb MD  
observation

## 2023-10-05 ENCOUNTER — EMERGENCY (EMERGENCY)
Facility: HOSPITAL | Age: 23
LOS: 1 days | Discharge: HOME | End: 2023-10-05
Attending: EMERGENCY MEDICINE | Admitting: EMERGENCY MEDICINE
Payer: MEDICAID

## 2023-10-05 VITALS
RESPIRATION RATE: 16 BRPM | OXYGEN SATURATION: 99 % | DIASTOLIC BLOOD PRESSURE: 58 MMHG | TEMPERATURE: 98 F | HEART RATE: 78 BPM | SYSTOLIC BLOOD PRESSURE: 100 MMHG

## 2023-10-05 DIAGNOSIS — Z90.49 ACQUIRED ABSENCE OF OTHER SPECIFIED PARTS OF DIGESTIVE TRACT: Chronic | ICD-10-CM

## 2023-10-05 LAB
ALBUMIN SERPL ELPH-MCNC: 4.2 G/DL — SIGNIFICANT CHANGE UP (ref 3.3–5)
ALP SERPL-CCNC: 55 U/L — SIGNIFICANT CHANGE UP (ref 40–120)
ALT FLD-CCNC: 11 U/L — SIGNIFICANT CHANGE UP (ref 4–33)
ANION GAP SERPL CALC-SCNC: 11 MMOL/L — SIGNIFICANT CHANGE UP (ref 7–14)
AST SERPL-CCNC: 12 U/L — SIGNIFICANT CHANGE UP (ref 4–32)
BASOPHILS # BLD AUTO: 0.06 K/UL — SIGNIFICANT CHANGE UP (ref 0–0.2)
BASOPHILS NFR BLD AUTO: 0.7 % — SIGNIFICANT CHANGE UP (ref 0–2)
BILIRUB SERPL-MCNC: <0.2 MG/DL — SIGNIFICANT CHANGE UP (ref 0.2–1.2)
BUN SERPL-MCNC: 7 MG/DL — SIGNIFICANT CHANGE UP (ref 7–23)
CALCIUM SERPL-MCNC: 9.3 MG/DL — SIGNIFICANT CHANGE UP (ref 8.4–10.5)
CHLORIDE SERPL-SCNC: 106 MMOL/L — SIGNIFICANT CHANGE UP (ref 98–107)
CO2 SERPL-SCNC: 22 MMOL/L — SIGNIFICANT CHANGE UP (ref 22–31)
CREAT SERPL-MCNC: 0.71 MG/DL — SIGNIFICANT CHANGE UP (ref 0.5–1.3)
EGFR: 122 ML/MIN/1.73M2 — SIGNIFICANT CHANGE UP
EOSINOPHIL # BLD AUTO: 0.23 K/UL — SIGNIFICANT CHANGE UP (ref 0–0.5)
EOSINOPHIL NFR BLD AUTO: 2.6 % — SIGNIFICANT CHANGE UP (ref 0–6)
GLUCOSE SERPL-MCNC: 98 MG/DL — SIGNIFICANT CHANGE UP (ref 70–99)
HCG SERPL-ACNC: <1 MIU/ML — SIGNIFICANT CHANGE UP
HCT VFR BLD CALC: 31.9 % — LOW (ref 34.5–45)
HGB BLD-MCNC: 10.1 G/DL — LOW (ref 11.5–15.5)
IANC: 5.42 K/UL — SIGNIFICANT CHANGE UP (ref 1.8–7.4)
IMM GRANULOCYTES NFR BLD AUTO: 0.1 % — SIGNIFICANT CHANGE UP (ref 0–0.9)
LYMPHOCYTES # BLD AUTO: 2.41 K/UL — SIGNIFICANT CHANGE UP (ref 1–3.3)
LYMPHOCYTES # BLD AUTO: 27.6 % — SIGNIFICANT CHANGE UP (ref 13–44)
MCHC RBC-ENTMCNC: 25.4 PG — LOW (ref 27–34)
MCHC RBC-ENTMCNC: 31.7 GM/DL — LOW (ref 32–36)
MCV RBC AUTO: 80.4 FL — SIGNIFICANT CHANGE UP (ref 80–100)
MONOCYTES # BLD AUTO: 0.61 K/UL — SIGNIFICANT CHANGE UP (ref 0–0.9)
MONOCYTES NFR BLD AUTO: 7 % — SIGNIFICANT CHANGE UP (ref 2–14)
NEUTROPHILS # BLD AUTO: 5.42 K/UL — SIGNIFICANT CHANGE UP (ref 1.8–7.4)
NEUTROPHILS NFR BLD AUTO: 62 % — SIGNIFICANT CHANGE UP (ref 43–77)
NRBC # BLD: 0 /100 WBCS — SIGNIFICANT CHANGE UP (ref 0–0)
NRBC # FLD: 0 K/UL — SIGNIFICANT CHANGE UP (ref 0–0)
PLATELET # BLD AUTO: 338 K/UL — SIGNIFICANT CHANGE UP (ref 150–400)
POTASSIUM SERPL-MCNC: 4 MMOL/L — SIGNIFICANT CHANGE UP (ref 3.5–5.3)
POTASSIUM SERPL-SCNC: 4 MMOL/L — SIGNIFICANT CHANGE UP (ref 3.5–5.3)
PROT SERPL-MCNC: 7.3 G/DL — SIGNIFICANT CHANGE UP (ref 6–8.3)
RBC # BLD: 3.97 M/UL — SIGNIFICANT CHANGE UP (ref 3.8–5.2)
RBC # FLD: 13.2 % — SIGNIFICANT CHANGE UP (ref 10.3–14.5)
SODIUM SERPL-SCNC: 139 MMOL/L — SIGNIFICANT CHANGE UP (ref 135–145)
WBC # BLD: 8.74 K/UL — SIGNIFICANT CHANGE UP (ref 3.8–10.5)
WBC # FLD AUTO: 8.74 K/UL — SIGNIFICANT CHANGE UP (ref 3.8–10.5)

## 2023-10-05 PROCEDURE — 99285 EMERGENCY DEPT VISIT HI MDM: CPT

## 2023-10-05 PROCEDURE — 70486 CT MAXILLOFACIAL W/O DYE: CPT | Mod: 26,MA

## 2023-10-05 RX ORDER — ACETAMINOPHEN 500 MG
975 TABLET ORAL ONCE
Refills: 0 | Status: COMPLETED | OUTPATIENT
Start: 2023-10-05 | End: 2023-10-05

## 2023-10-05 RX ORDER — OXYCODONE HYDROCHLORIDE 5 MG/1
5 TABLET ORAL ONCE
Refills: 0 | Status: DISCONTINUED | OUTPATIENT
Start: 2023-10-05 | End: 2023-10-05

## 2023-10-05 RX ADMIN — OXYCODONE HYDROCHLORIDE 5 MILLIGRAM(S): 5 TABLET ORAL at 22:45

## 2023-10-05 RX ADMIN — Medication 975 MILLIGRAM(S): at 19:57

## 2023-10-05 NOTE — ED ADULT NURSE NOTE - NSFALLUNIVINTERV_ED_ALL_ED
Bed/Stretcher in lowest position, wheels locked, appropriate side rails in place/Call bell, personal items and telephone in reach/Instruct patient to call for assistance before getting out of bed/chair/stretcher/Non-slip footwear applied when patient is off stretcher/[place] to call system/Physically safe environment - no spills, clutter or unnecessary equipment/Purposeful proactive rounding/Room/bathroom lighting operational, light cord in reach

## 2023-10-05 NOTE — ED ADULT NURSE NOTE - OBJECTIVE STATEMENT
Pt received to intake. pt is Axo 3 and ambulatory. Pt c/o left facial swelling x 5days. Pt denies recent injury. Respirations are even and unlabored. Airway is clear and patent. Pt is able to clear secretions. Pt denies dizziness, N/V, abdominal pain, SOB, or fevers. Pt denies knowledge of allergies. Pt endorsees taking at home OTC medications for pain, but no relief. Pt medicated as ordered. Plan of care ongoing. Safety maintained.

## 2023-10-05 NOTE — ED PROVIDER NOTE - CLINICAL SUMMARY MEDICAL DECISION MAKING FREE TEXT BOX
A/P 22 yo F p/w left facial pain, mild trismus consider salivary gland duct stone, dental infection  Plan: CT, labs, analgesia, reassess

## 2023-10-05 NOTE — ED PROVIDER NOTE - PATIENT PORTAL LINK FT
You can access the FollowMyHealth Patient Portal offered by [facility] by registering at the following website: [email or web address]. By joining [facility]’s FollowMyHealth portal, you will also be able to view your health information using other applications (apps) compatible with our system.

## 2023-10-05 NOTE — ED PROVIDER NOTE - NSFOLLOWUPCLINICS_GEN_ALL_ED_FT
Oral & Maxillofacial Surgery  Department of Dental Medicine  [phone] [place] [address]  Phone: [phone]  Fax: [phone]

## 2023-10-05 NOTE — ED PROVIDER NOTE - NSFOLLOWUPINSTRUCTIONS_ED_ALL_ED_FT
Dental pain may be caused by many things, including:    Tooth decay (cavities or caries).  Infection.  The inner part of the tooth being filled with pus (abscess).  Injury.    Sometimes the cause of pain is unknown.    Your pain can vary. It may be mild or severe. You may have it all the time, or it may occur only when you are:    Chewing.  Exposed to hot or cold temperature.  Eating or drinking sugary foods or beverages, such as soda or candy.    Follow these instructions at home:      Medicines    Take over-the-counter and prescription medicines only as told by your doctor.  If you were prescribed an antibiotic medicine, take it as told by your doctor. Do not stop taking the medicine even if you start to feel better.        Eating and drinking    Do not eat foods or drinks that cause you pain. These include:    Very hot or very cold foods or drinks.  Sweet or sugary foods or drinks.        Managing pain and swelling     Gargle with a salt-water mixture 3–4 times a day. To make this, dissolve ½–1 tsp of salt in 1 cup of warm water.  If told, put ice on the painful area of your face:    Put ice in a plastic bag.   Place a towel between your skin and the bag.   Leave the ice on for 20 minutes, 2–3 times a day.        Brushing your teeth    Brush your teeth twice a day using a fluoride toothpaste.   Floss your teeth once a day.  Use a toothpaste made for sensitive teeth as told by your doctor.  Use a soft toothbrush.        General instructions    Do not apply heat to the outside of your face.  Watch your dental pain. Let your doctor know if there are any changes.  Keep all follow-up visits as told by your doctor. This is important.    Contact a doctor if:  Your pain is not relieved by medicines.  You have new symptoms.  Your symptoms get worse.    Get help right away if:  You cannot open your mouth.  You are having trouble breathing or swallowing.  You have a fever.  Your face, neck, or jaw is swollen.    Summary  Dental pain may be caused by many things, including tooth decay, injury, or infection. In some cases, the cause is not known.  Your pain may be mild or severe. You may have pain all the time, or you may have it only when you eat or drink.  Take over-the-counter and prescription medicines only as told by your doctor.  Watch your dental pain for any changes. Let your doctor know if symptoms get worse.    ADDITIONAL NOTES AND INSTRUCTIONS    Please follow up with your Primary MD in 24-48 hr.  Seek immediate medical care for any new/worsening signs or symptoms.

## 2023-10-05 NOTE — ED PROVIDER NOTE - PHYSICAL EXAMINATION
Attending/Luz: Well-appearing, NAD; PERRL/EOMI, non-icterus, +mild trismus, no obvious facial swelling, +submandibular pain, mild swelling, no overlying skin changes, +pain at salivary glan duct, no tooth pain on palp or gingival swelling, supple, no JAS, no JVD, RRR, CTAB; Abd-soft, NT/ND, no HSM; no LE edema, A&Ox3, nonfocal; Skin-warm/dry

## 2023-10-05 NOTE — ED PROVIDER NOTE - OBJECTIVE STATEMENT
Attending/Luz: 23 F p/w ~ five days of left facial and pain. Denies HA, dysphagia, fever/chills. Pt was seen and eval by dental and referred to the ED. Attending/Luz: 23 F p/w ~ five days of left facial and pain. Denies HA, dysphagia, fever/chills. Pt was seen and eval by dental and referred to the ED.    Tino Mata PGY2:  23-year-old female with no past medical history comes into the ED for evaluation of left-sided cheek pain and swelling for the past 5 days.  She reports she has had this pain in the past but it has resolved without any intervention.  She for started having this pain again days ago into her aggressively getting worse and she saw a dentist in the outpatient setting earlier today who was concerned for an infection of the 17th tooth which should be the left lower wisdom tooth.  They gave her a course of amoxicillin and sent her into the emergency department.  She is not on any other medication she does not have any allergies she has not had a travel tolerating her secretions.  She does states she has had some difficulty eating because she is unable to open her mouth fully secondary to pain.  She has been taking ibuprofen for her pain with mild relief.  No changes in her voice no dysphagia she has had some subjective fevers but no recorded temperature.

## 2023-10-05 NOTE — ED PROVIDER NOTE - PROGRESS NOTE DETAILS
Tino Mata PGY2:  Spoke with dental resident and described patient's physical exam.  They state if this is a infection of the 17th tooth there would be any intervention that they would do here in the emergency department.  They recommend she continue her antibiotics, pain control and follow-up with a oral surgeon. Tino Mata PGY2:  Reevaluated Pt by bedside. Updated Pt on labs and imaging. Answered all questions. Reviewed return precautions. Pt expressed understanding and feels comfortable with plan for DC to follow up with oral surgeon.

## 2023-10-06 VITALS
TEMPERATURE: 98 F | DIASTOLIC BLOOD PRESSURE: 63 MMHG | HEART RATE: 68 BPM | SYSTOLIC BLOOD PRESSURE: 106 MMHG | RESPIRATION RATE: 17 BRPM | OXYGEN SATURATION: 100 %

## 2023-10-06 NOTE — ED ADULT NURSE REASSESSMENT NOTE - NS ED NURSE REASSESS COMMENT FT1
patient awake. eating chicken and rice with  at bedside. after eating patient's  said she is feeling dizzy. patient had loc about 2 minutes later lasting about 10 mins long. provider made aware. vs completed and stable. provider at bedside. patient awake. aaox3 ambulatory w/ assist. moved into stretcher.

## 2023-10-19 ENCOUNTER — APPOINTMENT (OUTPATIENT)
Age: 23
End: 2023-10-19
Payer: COMMERCIAL

## 2023-10-19 PROCEDURE — D0140: CPT

## 2023-10-19 PROCEDURE — D0330 PANORAMIC RADIOGRAPHIC IMAGE: CPT

## 2024-01-04 ENCOUNTER — APPOINTMENT (OUTPATIENT)
Age: 24
End: 2024-01-04
Payer: COMMERCIAL

## 2024-01-04 PROCEDURE — D9310: CPT

## 2024-01-04 PROCEDURE — D0367: CPT

## 2024-04-18 ENCOUNTER — APPOINTMENT (OUTPATIENT)
Age: 24
End: 2024-04-18
Payer: COMMERCIAL

## 2024-04-18 PROCEDURE — D7230: CPT

## 2024-04-18 PROCEDURE — D9223: CPT

## 2024-04-18 PROCEDURE — D9222: CPT

## 2024-04-21 RX ORDER — AMOXICILLIN 250 MG/5ML
1 SUSPENSION, RECONSTITUTED, ORAL (ML) ORAL
Qty: 21 | Refills: 0
Start: 2024-04-21 | End: 2024-04-27

## 2024-04-21 RX ORDER — OXYCODONE HYDROCHLORIDE 5 MG/1
1 TABLET ORAL
Qty: 12 | Refills: 0
Start: 2024-04-21 | End: 2024-04-23

## 2024-04-22 ENCOUNTER — APPOINTMENT (OUTPATIENT)
Age: 24
End: 2024-04-22
Payer: COMMERCIAL

## 2024-04-22 PROCEDURE — 99024 POSTOP FOLLOW-UP VISIT: CPT

## 2024-04-22 RX ORDER — IBUPROFEN 200 MG
1 TABLET ORAL
Qty: 28 | Refills: 0
Start: 2024-04-22 | End: 2024-04-28

## 2024-04-22 RX ORDER — CHLORHEXIDINE GLUCONATE 213 G/1000ML
15 SOLUTION TOPICAL
Qty: 1 | Refills: 0
Start: 2024-04-22 | End: 2024-04-28

## 2024-04-22 RX ORDER — ACETAMINOPHEN 500 MG
1 TABLET ORAL
Qty: 28 | Refills: 0
Start: 2024-04-22 | End: 2024-04-28

## 2024-08-29 ENCOUNTER — APPOINTMENT (OUTPATIENT)
Dept: CARDIOLOGY | Facility: CLINIC | Age: 24
End: 2024-08-29
Payer: COMMERCIAL

## 2024-08-29 ENCOUNTER — NON-APPOINTMENT (OUTPATIENT)
Age: 24
End: 2024-08-29

## 2024-08-29 VITALS
DIASTOLIC BLOOD PRESSURE: 64 MMHG | OXYGEN SATURATION: 98 % | HEIGHT: 61 IN | HEART RATE: 76 BPM | SYSTOLIC BLOOD PRESSURE: 97 MMHG | BODY MASS INDEX: 27.19 KG/M2 | WEIGHT: 144 LBS

## 2024-08-29 DIAGNOSIS — R06.00 DYSPNEA, UNSPECIFIED: ICD-10-CM

## 2024-08-29 DIAGNOSIS — R07.9 CHEST PAIN, UNSPECIFIED: ICD-10-CM

## 2024-08-29 PROCEDURE — 93000 ELECTROCARDIOGRAM COMPLETE: CPT

## 2024-08-29 PROCEDURE — G2211 COMPLEX E/M VISIT ADD ON: CPT | Mod: NC

## 2024-08-29 PROCEDURE — 99204 OFFICE O/P NEW MOD 45 MIN: CPT | Mod: 25

## 2024-08-30 LAB
CHOLEST SERPL-MCNC: 161 MG/DL
ESTIMATED AVERAGE GLUCOSE: 114 MG/DL
HBA1C MFR BLD HPLC: 5.6 %
HDLC SERPL-MCNC: 35 MG/DL
LDLC SERPL CALC-MCNC: 97 MG/DL
NONHDLC SERPL-MCNC: 126 MG/DL
TRIGL SERPL-MCNC: 165 MG/DL

## 2024-09-04 NOTE — HISTORY OF PRESENT ILLNESS
[FreeTextEntry1] : PCP/Referring Doctor: Chief Complaint: " " ------------------------------------------------------------------------------------------------------------------------------------ History of Present Illness:   ROS:  chest pain (-), dyspnea with exertion (-), orthopnea (-), edema (-), palpitations (-), dizziness (-) All other systems were reviewed and are negative. ------------------------------------------------------------------------------------------------------------------------------------ Past Medical History: Any history of HTN? No/Yes: Any history of Lipid disorders? No/Yes: TG LDL Any history Diabetes or Prediabetes? No/Yes: HbA1c Any history of MI, stroke or TIA? No/Yes: Any prior Stress Testing? No/Yes: Any prior Cath/Angiogram procedure? No/Yes: Any prior Echocardiogram (TTE)? No/Yes: Any prior vascular study? No/Yes: Have patient been on blood thinners? No/Yes: Have patient had cardiac or vascular surgeries? No/Yes: ------------------------------------------------------------------------------------------------------------------------------------ Patient's current cardiovascular medications were reviewed and updated in chart. ------------------------------------------------------------------------------------------------------------------------------------ Family history Do you have a family history of heart attacks and/or stroke before the age of 60? No/Yes Do you have a family history of cardiac arrest? ------------------------------------------------------------------------------------------------------------------------------------ Social History: Do you currently or previously used tobacco including cigarettes and vapes? No/Yes - How many alcoholic drinks per week? 0-3, 3-7, >8 What is your occupation? works as/ not employed/retired ------------------------------------------------------------------------------------------------------------------------------------ Physical Exam: General appearance: NAD Lungs: CTAB CV: RRR Extremities: No peripheral edema.

## 2024-09-05 NOTE — ASSESSMENT
[FreeTextEntry1] : Problems Assessed: 1. RICHTER Plan: - ETT - ECG:  ECG shows normal sinus rhythm. No ST elevations or depressions are noted. - TTE for evaluation of cardiac structure and function. Follow-up: 1 month  ----------------------------------------------------------------------------------------- Billing Statement: ECG obtained in the diagnosis and treatment of assessed problems.  Total time spent on this encounter was 45 minutes. This includes non-face-to-face time before the visit when I reviewed relevant portions of the patient's medical record and time spent on documentation after the visit. During face-to-face time, I took a relevant history, examined the patient and explained differential diagnoses, relevant cardiac diagnoses, workup, and management plan.

## 2024-09-05 NOTE — REASON FOR VISIT
[FreeTextEntry1] : CC: Cardiac Checkup HPI: Previous TTE showed normal LV function in 2022.  Recurrent dyspnea with exertion, associated with CP  ROS:  chest pain (+), dyspnea with exertion (+), orthopnea (-), edema (-), palpitations (-). All other systems were reviewed and are negative.   PFSH: Patient's current cardiovascular medications were reviewed and updated in chart. PMHx as described in HPI above. No prior cardiac testing available for review No family history of premature CAD, or SCD. SH: tobacco (-), alcohol (-), recreational drugs (-)

## 2024-09-10 ENCOUNTER — APPOINTMENT (OUTPATIENT)
Dept: CARDIOLOGY | Facility: CLINIC | Age: 24
End: 2024-09-10

## 2024-09-12 NOTE — OB PROVIDER H&P - NSPREVIOUSLIVEBIRTHSNOWLIVING_OBGYN_ALL_OB_NU
Medication:   gabapentin (NEURONTIN) 300 MG capsule 180 capsule 1 refill  5/24/2024     Medication refill denied due to request to soon for Rx Refills    Upcoming appointment scheduled on: Visit date not found   Per last office visit, patient is to follow up in May 2024.   Last refill on: 05/24/24      
1

## 2024-10-09 ENCOUNTER — APPOINTMENT (OUTPATIENT)
Dept: CARDIOLOGY | Facility: CLINIC | Age: 24
End: 2024-10-09

## 2024-12-25 PROBLEM — F10.90 ALCOHOL USE: Status: INACTIVE | Noted: 2021-09-29

## 2025-04-14 ENCOUNTER — APPOINTMENT (OUTPATIENT)
Dept: DERMATOLOGY | Facility: CLINIC | Age: 25
End: 2025-04-14
Payer: COMMERCIAL

## 2025-04-14 ENCOUNTER — NON-APPOINTMENT (OUTPATIENT)
Age: 25
End: 2025-04-14

## 2025-04-14 VITALS — WEIGHT: 149 LBS | BODY MASS INDEX: 29.25 KG/M2 | HEIGHT: 60 IN

## 2025-04-14 DIAGNOSIS — L21.9 SEBORRHEIC DERMATITIS, UNSPECIFIED: ICD-10-CM

## 2025-04-14 DIAGNOSIS — K13.0 DISEASES OF LIPS: ICD-10-CM

## 2025-04-14 DIAGNOSIS — L24.9 IRRITANT CONTACT DERMATITIS, UNSPECIFIED CAUSE: ICD-10-CM

## 2025-04-14 DIAGNOSIS — T78.40XS ALLERGY, UNSPECIFIED, SEQUELA: ICD-10-CM

## 2025-04-14 PROCEDURE — 99204 OFFICE O/P NEW MOD 45 MIN: CPT

## 2025-04-14 RX ORDER — CLINDAMYCIN PHOSPHATE 10 MG/G
1 GEL TOPICAL
Qty: 30 | Refills: 0 | Status: ACTIVE | COMMUNITY
Start: 2025-04-08

## 2025-04-14 RX ORDER — METRONIDAZOLE 7.5 MG/G
0.75 CREAM TOPICAL
Qty: 45 | Refills: 0 | Status: ACTIVE | COMMUNITY
Start: 2025-04-08

## 2025-04-14 RX ORDER — ZINC SULFATE 50(220)MG
CAPSULE ORAL
Refills: 0 | Status: ACTIVE | COMMUNITY

## 2025-04-14 RX ORDER — FERROUS SULFATE 325(65) MG
TABLET ORAL
Refills: 0 | Status: ACTIVE | COMMUNITY

## 2025-04-14 RX ORDER — NYSTATIN 100000 U/G
100000 OINTMENT TOPICAL
Qty: 1 | Refills: 1 | Status: ACTIVE | COMMUNITY
Start: 2025-04-14 | End: 1900-01-01

## 2025-04-14 RX ORDER — HYDROCORTISONE 25 MG/G
2.5 OINTMENT TOPICAL
Qty: 1 | Refills: 0 | Status: ACTIVE | COMMUNITY
Start: 2025-04-14 | End: 1900-01-01

## 2025-04-14 RX ORDER — KETOCONAZOLE 20 MG/G
2 CREAM TOPICAL
Qty: 1 | Refills: 1 | Status: ACTIVE | COMMUNITY
Start: 2025-04-14 | End: 1900-01-01

## 2025-05-12 ENCOUNTER — APPOINTMENT (OUTPATIENT)
Dept: DERMATOLOGY | Facility: CLINIC | Age: 25
End: 2025-05-12

## (undated) DEVICE — PROTECTOR HEEL / ELBOW

## (undated) DEVICE — TIP METZENBAUM SCISSOR MONOPOLAR ENDOCUT (ORANGE)

## (undated) DEVICE — PACK GENERAL LAPAROSCOPY

## (undated) DEVICE — SOL IRR POUR NS 0.9% 1000ML

## (undated) DEVICE — SYR LUER LOK 20CC

## (undated) DEVICE — ELCTR GROUNDING PAD ADULT COVIDIEN

## (undated) DEVICE — D HELP - CLEARVIEW CLEARIFY SYSTEM

## (undated) DEVICE — GLV 7.5 PROTEXIS (WHITE)

## (undated) DEVICE — BASIN SET SINGLE

## (undated) DEVICE — TUBING INSUFFLATION LAP FILTER 10FT

## (undated) DEVICE — SOL IRR POUR H2O 500ML

## (undated) DEVICE — POSITIONER STRAP ARMBOARD VELCRO TS-30

## (undated) DEVICE — DRAPE 3/4 SHEET 52X76"

## (undated) DEVICE — SUT VICRYL 0 27" UR-6

## (undated) DEVICE — DRAPE LAPAROSCOPIC CHOLECYSTECTOMY

## (undated) DEVICE — SUT MONOCRYL 4-0 27" PS-2 UNDYED

## (undated) DEVICE — DISSECTOR ENDOSCOPIC KITTNER SINGLE TIP

## (undated) DEVICE — TUBING OLYMPUS INSUFFLATION

## (undated) DEVICE — VENODYNE/SCD SLEEVE CALF MEDIUM

## (undated) DEVICE — TROCAR COVIDIEN BLUNT TIP HASSAN 10MM STANDARD

## (undated) DEVICE — ELCTR BOVIE TIP BLADE INSULATED 2.75" EDGE

## (undated) DEVICE — TROCAR COVIDIEN BLUNT TIP HASSAN 10MM

## (undated) DEVICE — WARMING BLANKET UPPER ADULT

## (undated) DEVICE — BLADE SURGICAL #15 CARBON

## (undated) DEVICE — ENDOCATCH 10MM SPECIMEN POUCH

## (undated) DEVICE — CANISTER DISPOSABLE THIN WALL 3000CC

## (undated) DEVICE — DRAPE TOWEL BLUE 17" X 24"

## (undated) DEVICE — DRSG STERISTRIPS 0.5 X 4"

## (undated) DEVICE — TROCAR COVIDIEN VERSAPORT BLADELESS OPTICAL 5MM STANDARD

## (undated) DEVICE — TUBING HYDRO-SURG PLUS IRRIGATOR W SMOKEVAC & PROBE